# Patient Record
Sex: FEMALE | Race: WHITE | NOT HISPANIC OR LATINO | Employment: OTHER | URBAN - METROPOLITAN AREA
[De-identification: names, ages, dates, MRNs, and addresses within clinical notes are randomized per-mention and may not be internally consistent; named-entity substitution may affect disease eponyms.]

---

## 2018-05-07 ENCOUNTER — HOSPITAL ENCOUNTER (OUTPATIENT)
Dept: RADIOLOGY | Facility: MEDICAL CENTER | Age: 63
End: 2018-05-07
Attending: PHYSICIAN ASSISTANT
Payer: COMMERCIAL

## 2018-05-07 ENCOUNTER — HOSPITAL ENCOUNTER (OUTPATIENT)
Dept: RADIOLOGY | Facility: MEDICAL CENTER | Age: 63
End: 2018-05-07

## 2018-05-07 DIAGNOSIS — Z12.39 SCREENING BREAST EXAMINATION: ICD-10-CM

## 2018-05-07 PROCEDURE — 77067 SCR MAMMO BI INCL CAD: CPT

## 2018-05-08 ENCOUNTER — TELEPHONE (OUTPATIENT)
Dept: MEDICAL GROUP | Facility: MEDICAL CENTER | Age: 63
End: 2018-05-08

## 2018-05-08 NOTE — TELEPHONE ENCOUNTER
----- Message from Claudette Child P.A.-C. sent at 5/8/2018 10:01 AM PDT -----  Please inform patient that Mammogram result was normal, follow up mammogram is recommended in one year.      Thank you,  Claudette Child P.A.-C.

## 2018-05-08 NOTE — TELEPHONE ENCOUNTER
Phone Number Called: [phone] (home)     Message: LVM to call back.     Left Message for patient to call back: yes

## 2018-05-16 ENCOUNTER — OFFICE VISIT (OUTPATIENT)
Dept: MEDICAL GROUP | Facility: MEDICAL CENTER | Age: 63
End: 2018-05-16
Payer: COMMERCIAL

## 2018-05-16 VITALS
HEART RATE: 68 BPM | HEIGHT: 64 IN | BODY MASS INDEX: 23.73 KG/M2 | OXYGEN SATURATION: 99 % | WEIGHT: 139 LBS | TEMPERATURE: 97.3 F | DIASTOLIC BLOOD PRESSURE: 80 MMHG | SYSTOLIC BLOOD PRESSURE: 120 MMHG

## 2018-05-16 DIAGNOSIS — F41.8 DEPRESSION WITH ANXIETY: ICD-10-CM

## 2018-05-16 DIAGNOSIS — G47.9 SLEEP DISORDER: ICD-10-CM

## 2018-05-16 DIAGNOSIS — E78.00 HYPERCHOLESTEREMIA: ICD-10-CM

## 2018-05-16 DIAGNOSIS — A60.00 GENITAL HERPES SIMPLEX, UNSPECIFIED SITE: ICD-10-CM

## 2018-05-16 DIAGNOSIS — I10 ESSENTIAL HYPERTENSION: ICD-10-CM

## 2018-05-16 PROCEDURE — 99204 OFFICE O/P NEW MOD 45 MIN: CPT | Performed by: PHYSICIAN ASSISTANT

## 2018-05-16 RX ORDER — ZOLPIDEM TARTRATE 5 MG/1
5 TABLET ORAL NIGHTLY PRN
Qty: 30 TAB | Refills: 0
Start: 2018-05-16 | End: 2018-06-15

## 2018-05-16 ASSESSMENT — PATIENT HEALTH QUESTIONNAIRE - PHQ9: CLINICAL INTERPRETATION OF PHQ2 SCORE: 0

## 2018-05-16 NOTE — PROGRESS NOTES
On on Kendra Ryan is a 62 y.o. new female here for establishing care:      HPI:    Depression with anxiety  Pt is currently taking sertraline for a couple years without any side effects. Stasis medicine has been helping her with her symptoms. Before she was taking paroxetine for anxiety, she stopped because it was making her too tired.    Essential hypertension  Pt has known HTN, controlled with current medicines, lisinopril. She denies HA, blurred vision, dizziness, shortness of breath, palpitations, or chest pain, lower extremity edema.      Genital herpes  Pt gets genital herpes outbreaks once or twice a year treated with Valtrex as needed. Currently no sores or outbreaks.    Hypercholesteremia  Currently on atorvastatin for elevated cholesterol. Denies any SE or mylagia.  Denies SOP, CP      Current medicines (including changes today)  Current Outpatient Prescriptions   Medication Sig Dispense Refill   • SERTRALINE HCL PO Take  by mouth.     • ATORVASTATIN CALCIUM PO Take  by mouth.     • LISINOPRIL PO Take  by mouth.     • ValACYclovir HCl (VALTREX PO) Take  by mouth.     • zolpidem (AMBIEN) 5 MG Tab Take 1 Tab by mouth at bedtime as needed for Sleep for up to 30 days. 30 Tab 0   • Cyanocobalamin (VITAMIN B-12) 1000 MCG/15ML LIQD Take  by mouth every day.     • aspirin (ASA) 325 MG TABS Take 325 mg by mouth every 6 hours as needed.     • tramadol (ULTRAM) 50 MG TABS Take 1 Tab by mouth every 6 hours as needed ((Pain Scale 1-3)). 120 Tab 0   • ondansetron (ZOFRAN ODT) 4 MG TBDP Take 1-2 Tabs by mouth every four hours as needed for Nausea/Vomiting. 30 Tab 0   • enoxaparin (LOVENOX) 30 MG/0.3ML SOLN inj Inject 30 mg as instructed every 12 hours. 42 Syringe 0   • EPINEPHrine (EPIPEN) 0.3 MG/0.3ML SOAJ by Injection route as needed.     • MetroNIDAZOLE (FLAGYL PO) Take  by mouth. Pt to take prior to surgery, hx of c-diff     • paroxetine (PAXIL) 20 MG TABS Take 20 mg by mouth every evening. Take  with food       No current facility-administered medications for this visit.      She  has a past medical history of Arthritis; Bronchitis (); C. difficile colitis; C. difficile diarrhea (2013); Cancer ([facility]) (); Diverticulitis; Insomnia related to another mental disorder; Pain; Pneumonia (); Psychiatric disturbance; and ULCERATIVE COLITIS. She also has no past medical history of ASTHMA or Diabetes.  She  has a past surgical history that includes other orthopedic surgery; tonsillectomy; wrist orif (2013); fecal transplant (13); and knee arthroplasty total (2014).  Social History   Substance Use Topics   • Smoking status: Former Smoker     Types: Cigarettes     Quit date: 2013   • Smokeless tobacco: Never Used      Comment: 2 cig /week   • Alcohol use Yes      Comment: 2-3 per week     Social History     Social History Narrative   • No narrative on file     Family History   Problem Relation Age of Onset   • Cancer Mother 48     breast   • Cancer Maternal Grandmother    • Hypertension Father    • Heart Disease Father      Family Status   Relation Status   • Mother    • Maternal Grandmother    • Father    • Brother        Past medical, surgical, family, and social history is reviewed in Epic chart by me today.   Medications and allergies reviewed in Epic chart by me today.       ROS  General:  No fevers or chills, no night sweats or fatigue.   Eyes: no change in vision.   ENT:         Ears: No drainage. No change in hearing      Nose :No epitaxis        Mouth/ throat: No lesions. No sore throat  Resp: No difficulty breathing. No cough, wheezing.  CV: no SOB, no palpitation, no chest pain, no edema  GI: no nausea, no vomiting, no diarrhea or constipations. Bowel regular and normal. No melena or hematochezia. No hematemesis  : no Frequency, no urgency, no dysuria, no hematuria.   MS:  Negative for muscle pain or weakness.  Skin: no rashes or abnormal lesions.  "  Neuro: No seizures, no tingling or numbness.   Endo: no polyuria, no polydypsia, no cold intolerance, no heat intolerance  Hem: no easy bruising.    As documented in history of present illness  ROS neg:  All other review of systems were reviewed and negative.             Objective:     Blood pressure 120/80, pulse 68, temperature 36.3 °C (97.3 °F), height 1.626 m (5' 4\"), weight 63 kg (139 lb), SpO2 99 %, not currently breastfeeding. Body mass index is 23.86 kg/m².  Physical Exam:    Constitutional: Well-developed and well-nourished. No distress.   Skin: Skin is warm and dry. No rashes in visible areas.  Nail: w/o pitting, ridging or onychomycosis   Head: Atraumatic without lesions.  Eyes: Equal, round and reactive, conjunctiva clear,sclera non icteric, lids normal.  Ears:  External ears unremarkable. Tympanic membranes clear and intact.  Nose: Nares patent. Septum midline. Turbinates without erythema nor edema. No discharge.    Mouth/Throat: Dentition is good. Tongue normal. Oropharynx is clear and moist. Posterior pharynx without erythema or exudates.  Neck: Supple, trachea midline.  No thyromegaly present. No lymphadenopathy--cervical or supraclavicular  Cardiovascular: Regular rate and rhythm, without any murmurs.  No lower extremity edema. Cap refill < 3sec  Lung:  Clear to auscultation throughout w/o any wheeze or rhonchi. No adventitious sounds.    Abdomen: Soft, non tender, and without distention.   Musculoskeletal: All major joints without pain or swelling  Neurological: speech normal, mental status intact, gait grossly normal  Psychiatric:   Alert and oriented x3, normal affect and mood and behavior     Assessment and Plan:   The following treatment plan was discussed    1. Sleep disorder    - zolpidem (AMBIEN) 5 MG Tab; Take 1 Tab by mouth at bedtime as needed for Sleep for up to 30 days.  Dispense: 30 Tab; Refill: 0    2. Essential hypertension  - lisinopril    3. " Hypercholesteremia  -Atorvastatin      4. Depression with anxiety      5. Genital herpes simplex, unspecified site      Last lab 6 months ago at [facility]. Normal      Followup: Return pap smear .         Please note that this dictation was created using voice recognition software. I have made every reasonable attempt to correct obvious errors, but I expect that there are errors of grammar and possibly content that I did not discover before finalizing the note

## 2018-05-16 NOTE — ASSESSMENT & PLAN NOTE
Pt is currently taking sertraline for a couple years without any side effects. Stasis medicine has been helping her with her symptoms. Before she was taking paroxetine for anxiety, she stopped because it was making her too tired.

## 2018-05-16 NOTE — ASSESSMENT & PLAN NOTE
Pt gets genital herpes outbreaks once or twice a year treated with Valtrex as needed. Currently no sores or outbreaks.

## 2018-05-16 NOTE — ASSESSMENT & PLAN NOTE
Pt has known HTN, controlled with current medicines, lisinopril. She denies HA, blurred vision, dizziness, shortness of breath, palpitations, or chest pain, lower extremity edema.

## 2018-05-16 NOTE — LETTER
[facility]  Amilcar Lino M.D.  [address]  [place] [address]  Fax: [phone]   Authorization for Release/Disclosure of   Protected Health Information   Name: TEODORO DELGADO : 1955 SSN: [id number]   Address: [address] [place] [address] [address] [place] [address] Phone:    [phone] (home)    I authorize the entity listed below to release/disclose the PHI below to:   [facility]/Amilcar Lino M.D. and Claudette Child P.A.-C.   Provider or Entity Name: Dr. Santa Otto     Address   City, State, Zip   Phone:      Fax:[phone]     Reason for request: continuity of care   Information to be released:    [  ] LAST COLONOSCOPY,  including any PATH REPORT and follow-up  [  ] LAST FIT/COLOGUARD RESULT [  ] LAST DEXA  [  ] LAST MAMMOGRAM  [  ] LAST PAP  [  ] LAST LABS [  ] RETINA EXAM REPORT  [  ] IMMUNIZATION RECORDS  [ xxx ] Release all info      [  ] Check here and initial the line next to each item to release ALL health information INCLUDING  _____ Care and treatment for drug and / or alcohol abuse  _____ HIV testing, infection status, or AIDS  _____ Genetic Testing    DATES OF SERVICE OR TIME PERIOD TO BE DISCLOSED: _____________  I understand and acknowledge that:  * This Authorization may be revoked at any time by you in writing, except if your health information has already been used or disclosed.  * Your health information that will be used or disclosed as a result of you signing this authorization could be re-disclosed by the recipient. If this occurs, your re-disclosed health information may no longer be protected by State or Federal laws.  * You may refuse to sign this Authorization. Your refusal will not affect your ability to obtain treatment.  * This Authorization becomes effective upon signing and will  on (date) __________.      If no date is indicated, this Authorization will  one (1) year from the signature date.    Name: Teodoro  Tracey Ryan    Signature:   Date:     5/16/2018       PLEASE FAX REQUESTED RECORDS BACK TO: [phone]

## 2018-06-13 ENCOUNTER — APPOINTMENT (OUTPATIENT)
Dept: MEDICAL GROUP | Facility: MEDICAL CENTER | Age: 63
End: 2018-06-13
Payer: COMMERCIAL

## 2018-06-20 ENCOUNTER — TELEPHONE (OUTPATIENT)
Dept: MEDICAL GROUP | Facility: MEDICAL CENTER | Age: 63
End: 2018-06-20

## 2018-06-20 ENCOUNTER — OFFICE VISIT (OUTPATIENT)
Dept: MEDICAL GROUP | Facility: MEDICAL CENTER | Age: 63
End: 2018-06-20
Payer: COMMERCIAL

## 2018-06-20 VITALS
DIASTOLIC BLOOD PRESSURE: 78 MMHG | BODY MASS INDEX: 24.13 KG/M2 | TEMPERATURE: 98.1 F | OXYGEN SATURATION: 97 % | HEART RATE: 74 BPM | HEIGHT: 64 IN | SYSTOLIC BLOOD PRESSURE: 112 MMHG | WEIGHT: 141.31 LBS

## 2018-06-20 DIAGNOSIS — E78.00 HYPERCHOLESTEREMIA: ICD-10-CM

## 2018-06-20 DIAGNOSIS — F41.8 DEPRESSION WITH ANXIETY: ICD-10-CM

## 2018-06-20 DIAGNOSIS — R01.1 HEART MURMUR: ICD-10-CM

## 2018-06-20 DIAGNOSIS — I10 ESSENTIAL HYPERTENSION: ICD-10-CM

## 2018-06-20 DIAGNOSIS — G47.9 SLEEP DISORDER: ICD-10-CM

## 2018-06-20 DIAGNOSIS — R53.83 TIREDNESS: ICD-10-CM

## 2018-06-20 DIAGNOSIS — A60.00 GENITAL HERPES SIMPLEX, UNSPECIFIED SITE: ICD-10-CM

## 2018-06-20 DIAGNOSIS — Z00.00 PREVENTATIVE HEALTH CARE: ICD-10-CM

## 2018-06-20 PROCEDURE — 99214 OFFICE O/P EST MOD 30 MIN: CPT | Performed by: PHYSICIAN ASSISTANT

## 2018-06-20 RX ORDER — TRAZODONE HYDROCHLORIDE 50 MG/1
50 TABLET ORAL
Qty: 30 TAB | Refills: 3 | Status: CANCELLED | OUTPATIENT
Start: 2018-06-20

## 2018-06-20 RX ORDER — ZOLPIDEM TARTRATE 10 MG/1
10 TABLET ORAL NIGHTLY PRN
COMMUNITY
End: 2018-06-20 | Stop reason: SDUPTHER

## 2018-06-20 RX ORDER — TRAZODONE HYDROCHLORIDE 50 MG/1
50 TABLET ORAL
Qty: 30 TAB | Refills: 5 | Status: SHIPPED | OUTPATIENT
Start: 2018-06-20 | End: 2019-02-04

## 2018-06-20 RX ORDER — ZOLPIDEM TARTRATE 10 MG/1
10 TABLET ORAL NIGHTLY PRN
Qty: 30 TAB | Refills: 2 | Status: SHIPPED
Start: 2018-06-20 | End: 2018-06-21 | Stop reason: SDUPTHER

## 2018-06-20 RX ORDER — ACYCLOVIR 200 MG/1
800 CAPSULE ORAL 2 TIMES DAILY
Qty: 30 CAP | Refills: 3 | Status: SHIPPED | OUTPATIENT
Start: 2018-06-20 | End: 2019-05-23

## 2018-06-20 RX ORDER — LISINOPRIL 10 MG/1
10 TABLET ORAL DAILY
Qty: 90 TAB | Refills: 3 | Status: ON HOLD | OUTPATIENT
Start: 2018-06-20 | End: 2019-05-31

## 2018-06-20 RX ORDER — ATORVASTATIN CALCIUM 20 MG/1
20 TABLET, FILM COATED ORAL DAILY
Qty: 90 TAB | Refills: 3 | Status: ON HOLD | OUTPATIENT
Start: 2018-06-20 | End: 2019-05-31

## 2018-06-20 NOTE — ASSESSMENT & PLAN NOTE
Pt has known HTN, controlled with current medicines, lisinopril 10 mg. She denies HA, blurred vision, dizziness,  palpitations, or chest pain, lower extremity edema.

## 2018-06-20 NOTE — TELEPHONE ENCOUNTER
"Please inform patient that I did send trazodone to pharmacy however she is to be careful since she is also taking Zoloft. They can both increased serotonin levels, therefore potential risk for \"serotonin syndrome\". I recommend staying away from trazodone as much as possible.    Claudette Child, P.A.-C.        "

## 2018-06-20 NOTE — PROGRESS NOTES
Subjective:   CC: Heart murmur, follow-up on depression and anxiety, sleep disorder, hypertension       Kendra Ryan is a 62 y.o. female here today for :    pt has depression with anxiety, and is currently taking Zoloft 50 MG daily for depression and anxiety which has been helping with her symptoms. Patient also has sleep disorder and trouble sleeping at night and takes Ambien as the need at night. States he done and it has a very difficult time sleeping at night. However she has been trying to wean herself off. Some nights into the taking the Ambien she has been taking trazodone 25-50 MG daily at bedtime.    Pt has Essential hypertension, controlled with current medicines, lisinopril 10 mg. She denies HA, blurred vision, dizziness,  palpitations, or chest pain, lower extremity edema. Patient was told she has a heart murmur about a year and a half ago and was referredby a cardiologist to do an echo which never happened. She didn't follow-up with a cardiologist. States she has fatigue and tiredness and tired every day.             Current medicines (including changes today)  Current Outpatient Prescriptions   Medication Sig Dispense Refill   • zolpidem (AMBIEN) 10 MG Tab Take 1 Tab by mouth at bedtime as needed for Sleep for up to 30 days. 30 Tab 2   • lisinopril (PRINIVIL) 10 MG Tab Take 1 Tab by mouth every day. 90 Tab 3   • atorvastatin (LIPITOR) 20 MG Tab Take 1 Tab by mouth every day. 90 Tab 3   • sertraline (ZOLOFT) 50 MG Tab Take 1 Tab by mouth every day. 90 Tab 3   • acyclovir (ZOVIRAX) 200 MG Cap Take 4 Caps by mouth 2 times a day. 30 Cap 3   • SERTRALINE HCL PO Take  by mouth.     • ATORVASTATIN CALCIUM PO Take  by mouth.     • LISINOPRIL PO Take  by mouth.     • tramadol (ULTRAM) 50 MG TABS Take 1 Tab by mouth every 6 hours as needed ((Pain Scale 1-3)). 120 Tab 0   • ondansetron (ZOFRAN ODT) 4 MG TBDP Take 1-2 Tabs by mouth every four hours as needed for Nausea/Vomiting. 30 Tab 0   •  "enoxaparin (LOVENOX) 30 MG/0.3ML SOLN inj Inject 30 mg as instructed every 12 hours. 42 Syringe 0   • EPINEPHrine (EPIPEN) 0.3 MG/0.3ML SOAJ by Injection route as needed.     • Cyanocobalamin (VITAMIN B-12) 1000 MCG/15ML LIQD Take  by mouth every day.     • aspirin (ASA) 325 MG TABS Take 325 mg by mouth every 6 hours as needed.     • MetroNIDAZOLE (FLAGYL PO) Take  by mouth. Pt to take prior to surgery, hx of c-diff     • paroxetine (PAXIL) 20 MG TABS Take 20 mg by mouth every evening. Take with food       No current facility-administered medications for this visit.          Past medical, surgical, family, and social history are reviewed in Epic chart by me today.   Medications and allergies reviewed in Epic chart by me today.         ROS   As documented in history of present illness above     Objective:     Blood pressure 112/78, pulse 74, temperature 36.7 °C (98.1 °F), height 1.626 m (5' 4\"), weight 64.1 kg (141 lb 5 oz), SpO2 97 %, not currently breastfeeding. Body mass index is 24.26 kg/m².   Physical Exam:  Constitutional: Alert, oriented in no acute distress.  Psych: Eye contact is good, speech goal directed, affect calm  Lungs: Unlabored respiratory effort, clear to auscultation bilaterally with good excursion, no wheez or rhonci  CV: regular rate and rhythm. Pos murmur,  No lower extremity edema  Skin: no lesions in visible areas.  Ext: no edema, color normal, vascularity normal, temperature normal        Assessment and Plan:   The following treatment plan was discussed    1. Heart murmur    - ECHOCARDIOGRAM COMP W/O CONT; Future  - REFERRAL TO CARDIOLOGY    2. Tiredness    - ECHOCARDIOGRAM COMP W/O CONT; Future  - REFERRAL TO CARDIOLOGY    3. Sleep disorder  Patient knows not to take trazodone and Ambien same night  Potential risk for serotonin syndrome since patient also takes Zoloft    - traZODone (DESYREL) 50 MG Tab; Take 1 Tab by mouth at bedtime as needed for Sleep.  Dispense: 30 Tab; Refill: 5  - " zolpidem (AMBIEN) 10 MG Tab; Take 1 Tab by mouth at bedtime as needed for Sleep for up to 30 days.  Dispense: 30 Tab; Refill: 2    4. Depression with anxiety    - sertraline (ZOLOFT) 50 MG Tab; Take 1 Tab by mouth every day.  Dispense: 90 Tab; Refill: 3    5. Essential hypertension    - lisinopril (PRINIVIL) 10 MG Tab; Take 1 Tab by mouth every day.  Dispense: 90 Tab; Refill: 3    6. Genital herpes simplex, unspecified site    - acyclovir (ZOVIRAX) 200 MG Cap; Take 4 Caps by mouth 2 times a day.  Dispense: 30 Cap; Refill: 3    7. Hypercholesteremia    - atorvastatin (LIPITOR) 20 MG Tab; Take 1 Tab by mouth every day.  Dispense: 90 Tab; Refill: 3    8. Preventative health care    - CBC WITH DIFFERENTIAL; Future  - COMP METABOLIC PANEL; Future  - LIPID PROFILE; Future  - TSH WITH REFLEX TO FT4; Future  - VITAMIN D,25 HYDROXY; Future    Last blood work a yr ago    Followup: No Follow-up on file.         Please note that this dictation was created using voice recognition software. I have made every reasonable attempt to correct obvious errors, but I expect that there are errors of grammar and possibly content that I did not discover before finalizing the note.

## 2018-06-20 NOTE — ASSESSMENT & PLAN NOTE
Patient is currently taking Zoloft 50 MG daily for depression and anxiety which has been helping with her symptoms. Patient also has trouble sleeping at night and takes Ambien as the need at night. States he done and it has a very difficult time sleeping at night. However she has been trying to wean herself off. Some nights into the taking the Ambien she has been taking trazodone 25-50 MG daily at bedtime.

## 2018-06-21 DIAGNOSIS — G47.9 SLEEP DISORDER: ICD-10-CM

## 2018-06-21 RX ORDER — ZOLPIDEM TARTRATE 10 MG/1
10 TABLET ORAL NIGHTLY PRN
Qty: 30 TAB | Refills: 2 | Status: SHIPPED
Start: 2018-06-21 | End: 2018-09-21 | Stop reason: SDUPTHER

## 2018-07-04 ENCOUNTER — APPOINTMENT (OUTPATIENT)
Dept: CARDIOLOGY | Facility: MEDICAL CENTER | Age: 63
End: 2018-07-04
Attending: PHYSICIAN ASSISTANT
Payer: COMMERCIAL

## 2018-07-06 ENCOUNTER — TELEPHONE (OUTPATIENT)
Dept: MEDICAL GROUP | Facility: MEDICAL CENTER | Age: 63
End: 2018-07-06

## 2018-07-06 NOTE — TELEPHONE ENCOUNTER
Patient called in to let you know that her insurance won't cover her labs under preventative health care. She also wanted to let you know her insurance won't cover the Echo and she would have to pay 1,000 dollars out of pocket.

## 2018-08-21 ENCOUNTER — TELEPHONE (OUTPATIENT)
Dept: CARDIOLOGY | Facility: MEDICAL CENTER | Age: 63
End: 2018-08-21

## 2018-08-22 ENCOUNTER — TELEPHONE (OUTPATIENT)
Dept: CARDIOLOGY | Facility: MEDICAL CENTER | Age: 63
End: 2018-08-22

## 2018-09-21 DIAGNOSIS — G47.9 SLEEP DISORDER: ICD-10-CM

## 2018-09-21 RX ORDER — ZOLPIDEM TARTRATE 10 MG/1
10 TABLET ORAL NIGHTLY PRN
Qty: 30 TAB | Refills: 2 | Status: SHIPPED
Start: 2018-09-21 | End: 2018-10-21

## 2018-09-21 NOTE — TELEPHONE ENCOUNTER
Was the patient seen in the last year in this department? Yes    Does patient have an active prescription for medications requested? No     Received Request Via: Pharmacy      placed in [facility]'s box for review.

## 2018-11-06 ENCOUNTER — TELEPHONE (OUTPATIENT)
Dept: CARDIOLOGY | Facility: MEDICAL CENTER | Age: 63
End: 2018-11-06

## 2018-11-06 NOTE — TELEPHONE ENCOUNTER
Called patient to ask if they had completed Echo at different location or rescheduled. No answer, LVM to call back.

## 2018-11-09 ENCOUNTER — OFFICE VISIT (OUTPATIENT)
Dept: CARDIOLOGY | Facility: MEDICAL CENTER | Age: 63
End: 2018-11-09
Payer: COMMERCIAL

## 2018-11-09 VITALS
RESPIRATION RATE: 14 BRPM | HEIGHT: 64 IN | HEART RATE: 74 BPM | OXYGEN SATURATION: 99 % | DIASTOLIC BLOOD PRESSURE: 64 MMHG | WEIGHT: 143 LBS | BODY MASS INDEX: 24.41 KG/M2 | SYSTOLIC BLOOD PRESSURE: 106 MMHG

## 2018-11-09 DIAGNOSIS — E78.00 HYPERCHOLESTEREMIA: ICD-10-CM

## 2018-11-09 DIAGNOSIS — R01.1 UNDIAGNOSED CARDIAC MURMURS: ICD-10-CM

## 2018-11-09 DIAGNOSIS — Z82.49 FAMILY HISTORY OF EARLY CAD: ICD-10-CM

## 2018-11-09 DIAGNOSIS — I10 ESSENTIAL HYPERTENSION: ICD-10-CM

## 2018-11-09 PROCEDURE — 99203 OFFICE O/P NEW LOW 30 MIN: CPT | Mod: 25 | Performed by: INTERNAL MEDICINE

## 2018-11-09 PROCEDURE — 93000 ELECTROCARDIOGRAM COMPLETE: CPT | Performed by: INTERNAL MEDICINE

## 2018-11-09 RX ORDER — ZOLPIDEM TARTRATE 10 MG/1
5 TABLET ORAL NIGHTLY PRN
COMMUNITY
End: 2019-02-01 | Stop reason: SDUPTHER

## 2018-11-09 NOTE — PROGRESS NOTES
Chief Complaint   Patient presents with   • Heart Murmur     Heart murmur possibly and essential hypertension.       Subjective:   Kendra Ryan is a 63 y.o. female who presents today for initial consultation regarding a heart murmur.  She has a history of hypertension and hyperlipidemia on therapy with good blood pressure control.  No history of coronary artery disease personally but she does have a family history of precocious CAD with her father having had his first heart attack in his 40s.  She is a non-smoker who drinks 2 drinks per night does not do drugs.  She has no exertional symptoms is moderately active and works at [employer].    Denies any other cardiovascular symptoms including chest pain, shortness of breath, dyspnea on exertion, lightheadedness, syncope or presyncope, lower extremity edema, PND, orthopnea or palpitations.      Past Medical History:   Diagnosis Date   • Arthritis     knees   • Bronchitis 2013   • C. difficile colitis    • C. difficile diarrhea 5/2013    resolved since 1/2014   • Cancer (HCC) 2008    skin cancer   • Diverticulitis    • Insomnia related to another mental disorder     stress   • Pain     right knee   • Pneumonia 2011   • Psychiatric disturbance     stress   • ULCERATIVE COLITIS      Past Surgical History:   Procedure Laterality Date   • KNEE ARTHROPLASTY TOTAL  9/26/2014    Performed by Marco Antonio Friend M.D. at SUR[facility]ERY [facility]   • FECAL TRANSPLANT  11/5/13   • WRIST ORIF  9/5/2013    Performed by Teto Hernandez M.D. at SURGERY [facility]   • OTHER ORTHOPEDIC SURGERY      knee    • TONSILLECTOMY       Family History   Problem Relation Age of Onset   • Cancer Mother 48        breast   • Cancer Maternal Grandmother    • Hypertension Father    • Heart Disease Father      Social History     Social History   • Marital status:      Spouse name: N/A   • Number of children: N/A   • Years of education: N/A     Occupational History   • Not on  file.     Social History Main Topics   • Smoking status: Former Smoker     Types: Cigarettes     Quit date: 2/20/2013   • Smokeless tobacco: Never Used      Comment: 2 cig /week   • Alcohol use Yes      Comment: 2-3 per week   • Drug use: No   • Sexual activity: Not Currently     Other Topics Concern   • Not on file     Social History Narrative   • No narrative on file     Allergies   Allergen Reactions   • Latex Anaphylaxis, Shortness of Breath and Swelling     Outpatient Encounter Prescriptions as of 11/9/2018   Medication Sig Dispense Refill   • zolpidem (AMBIEN) 10 MG Tab Take 5 mg by mouth at bedtime as needed for Sleep.     • lisinopril (PRINIVIL) 10 MG Tab Take 1 Tab by mouth every day. 90 Tab 3   • atorvastatin (LIPITOR) 20 MG Tab Take 1 Tab by mouth every day. 90 Tab 3   • sertraline (ZOLOFT) 50 MG Tab Take 1 Tab by mouth every day. 90 Tab 3   • acyclovir (ZOVIRAX) 200 MG Cap Take 4 Caps by mouth 2 times a day. 30 Cap 3   • EPINEPHrine (EPIPEN) 0.3 MG/0.3ML SOAJ by Injection route as needed.     • aspirin (ASA) 325 MG TABS Take 325 mg by mouth as needed.     • traZODone (DESYREL) 50 MG Tab Take 1 Tab by mouth at bedtime as needed for Sleep. (Patient not taking: Reported on 11/9/2018) 30 Tab 5   • SERTRALINE HCL PO Take  by mouth.     • ATORVASTATIN CALCIUM PO Take  by mouth.     • LISINOPRIL PO Take  by mouth.     • tramadol (ULTRAM) 50 MG TABS Take 1 Tab by mouth every 6 hours as needed ((Pain Scale 1-3)). (Patient not taking: Reported on 11/9/2018) 120 Tab 0   • ondansetron (ZOFRAN ODT) 4 MG TBDP Take 1-2 Tabs by mouth every four hours as needed for Nausea/Vomiting. (Patient not taking: Reported on 11/9/2018) 30 Tab 0   • enoxaparin (LOVENOX) 30 MG/0.3ML SOLN inj Inject 30 mg as instructed every 12 hours. (Patient not taking: Reported on 11/9/2018) 42 Syringe 0   • Cyanocobalamin (VITAMIN B-12) 1000 MCG/15ML LIQD Take  by mouth every day.     • MetroNIDAZOLE (FLAGYL PO) Take  by mouth. Pt to take prior to  "surgery, hx of c-diff     • paroxetine (PAXIL) 20 MG TABS Take 20 mg by mouth every evening. Take with food       No facility-administered encounter medications on file as of 11/9/2018.      Review of Systems   All other systems reviewed and are negative.       Objective:   /64 (BP Location: Left arm, Patient Position: Sitting, BP Cuff Size: Adult)   Pulse 74   Resp 14   Ht 1.626 m (5' 4\")   Wt 64.9 kg (143 lb)   SpO2 99%   BMI 24.55 kg/m²     Physical Exam   Constitutional: She is oriented to person, place, and time. She appears well-developed and well-nourished. No distress.   HENT:   Head: Normocephalic and atraumatic.   Right Ear: External ear normal.   Left Ear: External ear normal.   Mouth/Throat: No oropharyngeal exudate.   Eyes: Pupils are equal, round, and reactive to light. Conjunctivae and EOM are normal. Right eye exhibits no discharge. Left eye exhibits no discharge. No scleral icterus.   Neck: Normal range of motion. Neck supple. No JVD present. No tracheal deviation present. No thyromegaly present.   Cardiovascular: Normal rate, regular rhythm, S1 normal, S2 normal and intact distal pulses.  PMI is not displaced.  Exam reveals no gallop, no S3, no S4 and no friction rub.    Murmur heard.   Crescendo systolic murmur is present with a grade of 2/6   Pulses:       Carotid pulses are 2+ on the right side, and 2+ on the left side.       Radial pulses are 2+ on the right side, and 2+ on the left side.        Popliteal pulses are 2+ on the right side, and 2+ on the left side.        Dorsalis pedis pulses are 2+ on the right side, and 2+ on the left side.        Posterior tibial pulses are 2+ on the right side, and 2+ on the left side.   Pulmonary/Chest: Effort normal and breath sounds normal. No respiratory distress. She has no wheezes. She has no rales. She exhibits no tenderness.   Abdominal: Soft. Bowel sounds are normal. She exhibits no distension. There is no tenderness.   Musculoskeletal: " Normal range of motion. She exhibits no edema or tenderness.   Neurological: She is alert and oriented to person, place, and time. No cranial nerve deficit (Cranial nerves II through XII grossly intact).   Skin: Skin is warm and dry. No rash noted. She is not diaphoretic. No erythema.   Psychiatric: She has a normal mood and affect. Her behavior is normal. Judgment and thought content normal.   Vitals reviewed.    EKG (11/9/2018 ):  I have personally reviewed the EKG this visit and discussed with the patient.  Sinus rhythm with RSR prime in V1 normal variant.    Assessment:     1. Undiagnosed cardiac murmurs  EC-ECHOCARDIOGRAM COMPLETE W/O CONT   2. Essential hypertension  EKG    Lipid Profile    COMP METABOLIC PANEL   3. Family history of early CAD  Lipid Profile    COMP METABOLIC PANEL   4. Hypercholesteremia  Lipid Profile    COMP METABOLIC PANEL       Medical Decision Making:  Today's Assessment / Status / Plan:     Well and asymptomatic with very slight murmur.  She was recommended to have this investigated by her other cardiologist agrees but never was able to have this completed.  I agree with an echocardiogram to assess and help with surveillance and/or medical therapy.  Also recommend lipid profile complete metabolic panel and cholesterol targets based a primary prevention given her early family history of coronary artery disease.  Being on her cholesterol level on therapy, CT calcium scoring may be reasonable    Recommend follow-up 1 year

## 2018-11-12 LAB — EKG IMPRESSION: NORMAL

## 2018-12-03 ENCOUNTER — TELEPHONE (OUTPATIENT)
Dept: CARDIOLOGY | Facility: MEDICAL CENTER | Age: 63
End: 2018-12-03

## 2018-12-03 NOTE — TELEPHONE ENCOUNTER
----- Message from Destiny Bhatt sent at 12/3/2018 10:04 AM PST -----  Regarding: echo denied  Contact: [phone]  REECE/Juan Patricio from [employer] calling to report echo scheduled for tomorrow has been denied.  Please call [facility] to discuss, [phone].

## 2018-12-03 NOTE — TELEPHONE ENCOUNTER
Called and spoke with Sintia regarding denial. She states they did not receive notice that there was an issue until 1700 last night and then received a denial at 1000 this morning. Sintia provided number for peer to peer [phone] for case number [id number]. Called pt's insurance and conducted peer to peer. Authorization received, [id number] exp 1/3/19. Called and informed Sintia.

## 2019-01-11 RX ORDER — ZOLPIDEM TARTRATE 10 MG/1
TABLET ORAL
Refills: 2 | OUTPATIENT
Start: 2019-01-11

## 2019-01-30 NOTE — TELEPHONE ENCOUNTER
Pt requested a refill on zolpidem. I left pt a mess informing that this is a controlled medication that requires an office visit. I asked pt to call our scheduling dept for an zay

## 2019-02-01 ENCOUNTER — TELEPHONE (OUTPATIENT)
Dept: MEDICAL GROUP | Facility: MEDICAL CENTER | Age: 64
End: 2019-02-01

## 2019-02-01 DIAGNOSIS — G47.00 INSOMNIA, UNSPECIFIED TYPE: ICD-10-CM

## 2019-02-01 RX ORDER — ZOLPIDEM TARTRATE 10 MG/1
5 TABLET ORAL NIGHTLY PRN
Qty: 3 TAB | Refills: 0 | Status: SHIPPED
Start: 2019-02-01 | End: 2019-02-04 | Stop reason: SDUPTHER

## 2019-02-01 NOTE — PROGRESS NOTES
I have reviewed the Prescription Monitoring Program () today, she has been without Ambien for 3 days.  Last appointment with me was June last year.  She needs at least 3 months follow-up appointments for Ambien refill because it is a controlled substance.  Also last time she said that some nights she takes trazodone instead to help her sleeping at night.  Patient has appointment with Dr. Garcia on Monday.  I will talk to him about this patient.  I will give patient 3 Ambien pills until she is seen by Dr. Garcia on Monday.    Also she has not done any of the blood work I have ordered for her last time.  Last blood work on file is from 2014.  Patient is not being compliant.    Claudette Child P.A.-C.

## 2019-02-01 NOTE — TELEPHONE ENCOUNTER
Left pt a mess informing that we do not have openings here at [facility] today however she may head over to any urgent care office in order to be seen and evaluated for sleep problem.   Pt to call us if further questions

## 2019-02-01 NOTE — TELEPHONE ENCOUNTER
----- Message from Janet Rubalcava sent at 2/1/2019 11:43 AM PST -----  Regarding: Ambien  Contact: [phone]  Patient has an appt with Dr. Smith on Monday, she is a tahir patient. She's having sleeping issues and is requesting refill on Ambien. I told her there's usually no way to do this without being seen first. She's requesting something over the weekend, to get by until appt. Patient states she's starting to have hallucinations because she's so exhausted. Patient is waiting today in [facility] to see if there's an opening in the schedule       Janet

## 2019-02-04 ENCOUNTER — OFFICE VISIT (OUTPATIENT)
Dept: MEDICAL GROUP | Facility: MEDICAL CENTER | Age: 64
End: 2019-02-04
Payer: COMMERCIAL

## 2019-02-04 VITALS
BODY MASS INDEX: 24.07 KG/M2 | RESPIRATION RATE: 16 BRPM | TEMPERATURE: 97.9 F | OXYGEN SATURATION: 96 % | SYSTOLIC BLOOD PRESSURE: 114 MMHG | HEIGHT: 64 IN | HEART RATE: 75 BPM | WEIGHT: 141 LBS | DIASTOLIC BLOOD PRESSURE: 72 MMHG

## 2019-02-04 DIAGNOSIS — I10 ESSENTIAL HYPERTENSION: ICD-10-CM

## 2019-02-04 DIAGNOSIS — G47.00 INSOMNIA, UNSPECIFIED TYPE: ICD-10-CM

## 2019-02-04 DIAGNOSIS — E78.00 HYPERCHOLESTEREMIA: ICD-10-CM

## 2019-02-04 PROCEDURE — 99214 OFFICE O/P EST MOD 30 MIN: CPT | Performed by: FAMILY MEDICINE

## 2019-02-04 RX ORDER — ZOLPIDEM TARTRATE 10 MG/1
5 TABLET ORAL NIGHTLY PRN
Qty: 7 TAB | Refills: 0 | Status: SHIPPED | OUTPATIENT
Start: 2019-02-04 | End: 2019-02-18

## 2019-02-04 RX ORDER — EPINEPHRINE 0.3 MG/.3ML
INJECTION SUBCUTANEOUS PRN
Qty: 1 EACH | Refills: 3 | Status: CANCELLED | OUTPATIENT
Start: 2019-02-04

## 2019-02-04 RX ORDER — ZOLPIDEM TARTRATE 10 MG/1
5 TABLET ORAL NIGHTLY PRN
Qty: 3 TAB | Refills: 0 | Status: CANCELLED | OUTPATIENT
Start: 2019-02-04 | End: 2019-02-07

## 2019-02-04 ASSESSMENT — PATIENT HEALTH QUESTIONNAIRE - PHQ9: CLINICAL INTERPRETATION OF PHQ2 SCORE: 0

## 2019-02-04 NOTE — ASSESSMENT & PLAN NOTE
Patient describes a 4-year history of sleep onset and sleep maintenance insomnia.  She says that about 4 years ago she was admitted to the hospital off and on for C. difficile.  She was given Ambien and ultimately developed an addiction.  Since then, she has been maintained on Ambien and is trying to taper off.  She states she is currently taking Ambien 10 mg half a tablet nightly.  On rare occasion she will take a full tablet.  She would like to continue this dose and follow-up with her PCP.  She has used trazodone without success in the past.

## 2019-02-04 NOTE — PROGRESS NOTES
[facility]  Progress Note  Established Patient    Subjective:   Kendra Ryan is a 63 y.o. female here today with a chief complaint of insomnia. The patient is alone.     Insomnia  Patient describes a 4-year history of sleep onset and sleep maintenance insomnia.  She says that about 4 years ago she was admitted to the hospital off and on for C. difficile.  She was given Ambien and ultimately developed an addiction.  Since then, she has been maintained on Ambien and is trying to taper off.  She states she is currently taking Ambien 10 mg half a tablet nightly.  On rare occasion she will take a full tablet.  She would like to continue this dose and follow-up with her PCP.  She has used trazodone without success in the past.    Essential hypertension  Blood pressure is at goal on her current medication regimen.    Hypercholesteremia  Cholesterol is controlled with atorvastatin.  She has not gotten labs ordered by her PA-C.      Current Outpatient Prescriptions on File Prior to Visit   Medication Sig Dispense Refill   • lisinopril (PRINIVIL) 10 MG Tab Take 1 Tab by mouth every day. 90 Tab 3   • atorvastatin (LIPITOR) 20 MG Tab Take 1 Tab by mouth every day. 90 Tab 3   • sertraline (ZOLOFT) 50 MG Tab Take 1 Tab by mouth every day. 90 Tab 3   • aspirin (ASA) 325 MG TABS Take 325 mg by mouth as needed.     • acyclovir (ZOVIRAX) 200 MG Cap Take 4 Caps by mouth 2 times a day. 30 Cap 3   • EPINEPHrine (EPIPEN) 0.3 MG/0.3ML SOAJ by Injection route as needed.     • Cyanocobalamin (VITAMIN B-12) 1000 MCG/15ML LIQD Take  by mouth every day.       No current facility-administered medications on file prior to visit.        Past Medical History:   Diagnosis Date   • Arthritis     knees   • Bronchitis 2013   • C. difficile colitis    • C. 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    Family History: family history includes Cancer in her maternal grandmother; Cancer (age of onset: 48) in her mother; Heart Disease in her father; Hypertension in her father.    Social History:  reports that she quit smoking about 5 years ago. Her smoking use included Cigarettes. She has never used smokeless tobacco. She reports that she drinks alcohol. She reports that she does not use drugs.    ROS: no fever or nausea.        Objective:     Vitals:    02/04/19 0917   BP: 114/72   BP Location: Right arm   Patient Position: Sitting   BP Cuff Size: Adult   Pulse: 75   Resp: 16   Temp: 36.6 °C (97.9 °F)   TempSrc: Temporal   SpO2: 96%   Weight: 64 kg (141 lb)   Height: 1.626 m (5' 4\")       Physical Exam:  General: alert in no apparent distress.   Affect: normal.         Assessment and Plan:     1. Insomnia, unspecified type  I reinforced the risks of long-term Ambien use with the patient.  I commended her on her willingness to taper.   checked and appropriate.  I will prescribe her 14-day course of Ambien 10 mg 1/2 tablet daily.  When the patient sees her PCP in 2 weeks, she will discuss further taper.  Counseled on appropriate and safe use of this medicine.  - zolpidem (AMBIEN) 10 MG Tab; Take 0.5 Tabs by mouth at bedtime as needed for Sleep for up to 14 days.  Dispense: 7 Tab; Refill: 0    2. Essential hypertension  - continue current regimen.   - patient states got labs at an outside facility that were normal. She will bring in result to her PCP appt.     3. Hypercholesteremia  - continue statin.   - patient states got labs at an outside facility that were normal. She will bring in result to " her PCP appt.         Followup: Return in about 2 weeks (around 2/18/2019), or if symptoms worsen or fail to improve.

## 2019-02-05 ENCOUNTER — TELEPHONE (OUTPATIENT)
Dept: MEDICAL GROUP | Facility: MEDICAL CENTER | Age: 64
End: 2019-02-05

## 2019-02-05 NOTE — TELEPHONE ENCOUNTER
Pharmacy left a mess stating that pt picked up a quantity of 15 tablets for a 30 day supply rx'd by Dr. Otto on 1/14/2019.   The pharmacy received a new rx from you for 7 tabs for a 14 day supply.  Pharmacy stated that they need your approval in order to fill your rx since pt is not due until 2/14/2019 for a refill.   Dr. Smith, please advise....   info;    Fill Date Written Drug Qty Days Prescriber Pharmacy Refill Daily Dose *    01/14/2019 01/14/2019 Zolpidem Tartrate 10 Mg Tablet 15 30 Ka Yesenia Duong (5479) 0 0.25 [facility]   11/25/2018 09/21/2018 Zolpidem Tartrate 10 Mg Tablet 30 30 Se Di Duong (5479) 2 0.50 [facility]

## 2019-02-06 NOTE — TELEPHONE ENCOUNTER
Spoke with the pharmacy and they stated that pt picked up the rx for zolpidem as follow:  1) Dr. Otto rx'd 15 tab 1/14/19 for a 30 day supply       ( Per pt they got wet so she can't take them)  2) Dr. Smith rx'd 7 tab for 15 day supply and pt picked it up 2/4/2019.    I left pt a mess to keep her zay with [employer] on 2/19/2019 and the quantity of the pills she picked up ought to last until her zay.  Pt to call if further questions.

## 2019-02-06 NOTE — TELEPHONE ENCOUNTER
The patient stated that sometimes she would take a full tablet. Please call the pharmacy to approve the Rx.

## 2019-05-23 ENCOUNTER — HOSPITAL ENCOUNTER (OUTPATIENT)
Dept: RADIOLOGY | Facility: MEDICAL CENTER | Age: 64
DRG: 470 | End: 2019-05-23
Attending: ORTHOPAEDIC SURGERY | Admitting: ORTHOPAEDIC SURGERY
Payer: MEDICAID

## 2019-05-23 DIAGNOSIS — Z01.812 PRE-OPERATIVE LABORATORY EXAMINATION: ICD-10-CM

## 2019-05-23 DIAGNOSIS — Z01.811 PRE-OPERATIVE RESPIRATORY EXAMINATION: ICD-10-CM

## 2019-05-23 DIAGNOSIS — Z01.810 PRE-OPERATIVE CARDIOVASCULAR EXAMINATION: ICD-10-CM

## 2019-05-23 LAB
ANION GAP SERPL CALC-SCNC: 8 MMOL/L (ref 0–11.9)
APPEARANCE UR: CLEAR
APTT PPP: 28.9 SEC (ref 24.7–36)
BASOPHILS # BLD AUTO: 0.9 % (ref 0–1.8)
BASOPHILS # BLD: 0.04 K/UL (ref 0–0.12)
BILIRUB UR QL STRIP.AUTO: NEGATIVE
BUN SERPL-MCNC: 18 MG/DL (ref 8–22)
CALCIUM SERPL-MCNC: 9.5 MG/DL (ref 8.5–10.5)
CHLORIDE SERPL-SCNC: 110 MMOL/L (ref 96–112)
CO2 SERPL-SCNC: 24 MMOL/L (ref 20–33)
COLOR UR: YELLOW
CREAT SERPL-MCNC: 0.62 MG/DL (ref 0.5–1.4)
EKG IMPRESSION: NORMAL
EOSINOPHIL # BLD AUTO: 0.08 K/UL (ref 0–0.51)
EOSINOPHIL NFR BLD: 1.7 % (ref 0–6.9)
ERYTHROCYTE [DISTWIDTH] IN BLOOD BY AUTOMATED COUNT: 44.2 FL (ref 35.9–50)
ERYTHROCYTE [SEDIMENTATION RATE] IN BLOOD BY WESTERGREN METHOD: 4 MM/HOUR (ref 0–30)
GLUCOSE SERPL-MCNC: 87 MG/DL (ref 65–99)
GLUCOSE UR STRIP.AUTO-MCNC: NEGATIVE MG/DL
HCT VFR BLD AUTO: 41.8 % (ref 37–47)
HGB BLD-MCNC: 13.9 G/DL (ref 12–16)
IMM GRANULOCYTES # BLD AUTO: 0 K/UL (ref 0–0.11)
IMM GRANULOCYTES NFR BLD AUTO: 0 % (ref 0–0.9)
INR PPP: 0.98 (ref 0.87–1.13)
KETONES UR STRIP.AUTO-MCNC: ABNORMAL MG/DL
LEUKOCYTE ESTERASE UR QL STRIP.AUTO: NEGATIVE
LYMPHOCYTES # BLD AUTO: 1.88 K/UL (ref 1–4.8)
LYMPHOCYTES NFR BLD: 40.2 % (ref 22–41)
MCH RBC QN AUTO: 29.8 PG (ref 27–33)
MCHC RBC AUTO-ENTMCNC: 33.3 G/DL (ref 33.6–35)
MCV RBC AUTO: 89.7 FL (ref 81.4–97.8)
MICRO URNS: ABNORMAL
MONOCYTES # BLD AUTO: 0.32 K/UL (ref 0–0.85)
MONOCYTES NFR BLD AUTO: 6.8 % (ref 0–13.4)
NEUTROPHILS # BLD AUTO: 2.36 K/UL (ref 2–7.15)
NEUTROPHILS NFR BLD: 50.4 % (ref 44–72)
NITRITE UR QL STRIP.AUTO: NEGATIVE
NRBC # BLD AUTO: 0 K/UL
NRBC BLD-RTO: 0 /100 WBC
PH UR STRIP.AUTO: 5.5 [PH]
PLATELET # BLD AUTO: 274 K/UL (ref 164–446)
PMV BLD AUTO: 8.9 FL (ref 9–12.9)
POTASSIUM SERPL-SCNC: 3.9 MMOL/L (ref 3.6–5.5)
PROT UR QL STRIP: NEGATIVE MG/DL
PROTHROMBIN TIME: 13 SEC (ref 12–14.6)
RBC # BLD AUTO: 4.66 M/UL (ref 4.2–5.4)
RBC UR QL AUTO: NEGATIVE
SCCMEC + MECA PNL NOSE NAA+PROBE: NEGATIVE
SCCMEC + MECA PNL NOSE NAA+PROBE: NEGATIVE
SODIUM SERPL-SCNC: 142 MMOL/L (ref 135–145)
SP GR UR STRIP.AUTO: 1.03
UROBILINOGEN UR STRIP.AUTO-MCNC: 1 MG/DL
WBC # BLD AUTO: 4.7 K/UL (ref 4.8–10.8)

## 2019-05-23 PROCEDURE — 85652 RBC SED RATE AUTOMATED: CPT

## 2019-05-23 PROCEDURE — 85025 COMPLETE CBC W/AUTO DIFF WBC: CPT

## 2019-05-23 PROCEDURE — 85610 PROTHROMBIN TIME: CPT

## 2019-05-23 PROCEDURE — 87641 MR-STAPH DNA AMP PROBE: CPT

## 2019-05-23 PROCEDURE — 81003 URINALYSIS AUTO W/O SCOPE: CPT

## 2019-05-23 PROCEDURE — 36415 COLL VENOUS BLD VENIPUNCTURE: CPT

## 2019-05-23 PROCEDURE — 71046 X-RAY EXAM CHEST 2 VIEWS: CPT

## 2019-05-23 PROCEDURE — 80048 BASIC METABOLIC PNL TOTAL CA: CPT

## 2019-05-23 PROCEDURE — 93010 ELECTROCARDIOGRAM REPORT: CPT | Performed by: INTERNAL MEDICINE

## 2019-05-23 PROCEDURE — 87640 STAPH A DNA AMP PROBE: CPT | Mod: XU

## 2019-05-23 PROCEDURE — 93005 ELECTROCARDIOGRAM TRACING: CPT

## 2019-05-23 PROCEDURE — 85730 THROMBOPLASTIN TIME PARTIAL: CPT

## 2019-05-23 RX ORDER — ZOLPIDEM TARTRATE 5 MG/1
5 TABLET ORAL
COMMUNITY

## 2019-05-23 NOTE — DISCHARGE PLANNING
DISCHARGE PLANNING NOTE - TOTAL JOINT     Procedure: Procedure(s):  ARTHROPLASTY, KNEE, TOTAL  Procedure Date: 5/31/2019  Insurance:  Payor: [employer] MEDICAID / Plan: [facility] MEDICAID    Equipment currently available at home? None  Steps into the home? 2-3   Steps within the home? 2 story   Toilet height? Standard  Type of shower? walk-in shower  Who will be with you during your recovery? friend  Is Outpatient Physical Therapy set up after surgery? No   Did you take the Total Joint Class and where? No, provided a TJR binder to patient.     Plan: Reviewed Equipment Resource list and provided a copy to the patient. Recommended a raised toilet seat and shower chair. She will need a FWW and signed the choice form for [facility]; form scanned into media. Please obtain an order for a walker and have delivered to the patient's room. Anticipate discharge home.

## 2019-05-31 ENCOUNTER — HOSPITAL ENCOUNTER (INPATIENT)
Facility: MEDICAL CENTER | Age: 64
LOS: 1 days | DRG: 470 | End: 2019-06-01
Attending: ORTHOPAEDIC SURGERY | Admitting: ORTHOPAEDIC SURGERY
Payer: MEDICAID

## 2019-05-31 ENCOUNTER — ANESTHESIA (OUTPATIENT)
Dept: SURGERY | Facility: MEDICAL CENTER | Age: 64
DRG: 470 | End: 2019-05-31
Payer: MEDICAID

## 2019-05-31 ENCOUNTER — APPOINTMENT (OUTPATIENT)
Dept: RADIOLOGY | Facility: MEDICAL CENTER | Age: 64
DRG: 470 | End: 2019-05-31
Attending: PHYSICIAN ASSISTANT
Payer: MEDICAID

## 2019-05-31 ENCOUNTER — ANESTHESIA EVENT (OUTPATIENT)
Dept: SURGERY | Facility: MEDICAL CENTER | Age: 64
DRG: 470 | End: 2019-05-31
Payer: MEDICAID

## 2019-05-31 DIAGNOSIS — G89.18 POSTOPERATIVE PAIN: ICD-10-CM

## 2019-05-31 PROCEDURE — 0SRD0J9 REPLACEMENT OF LEFT KNEE JOINT WITH SYNTHETIC SUBSTITUTE, CEMENTED, OPEN APPROACH: ICD-10-PCS | Performed by: ORTHOPAEDIC SURGERY

## 2019-05-31 PROCEDURE — 160022 HCHG BLOCK: Performed by: ORTHOPAEDIC SURGERY

## 2019-05-31 PROCEDURE — 502000 HCHG MISC OR IMPLANTS RC 0278: Performed by: ORTHOPAEDIC SURGERY

## 2019-05-31 PROCEDURE — 502579 HCHG PACK, TOTAL KNEE: Performed by: ORTHOPAEDIC SURGERY

## 2019-05-31 PROCEDURE — 700102 HCHG RX REV CODE 250 W/ 637 OVERRIDE(OP): Performed by: ANESTHESIOLOGY

## 2019-05-31 PROCEDURE — 700111 HCHG RX REV CODE 636 W/ 250 OVERRIDE (IP): Performed by: ANESTHESIOLOGY

## 2019-05-31 PROCEDURE — 73560 X-RAY EXAM OF KNEE 1 OR 2: CPT | Mod: LT

## 2019-05-31 PROCEDURE — 160048 HCHG OR STATISTICAL LEVEL 1-5: Performed by: ORTHOPAEDIC SURGERY

## 2019-05-31 PROCEDURE — 700101 HCHG RX REV CODE 250: Performed by: ANESTHESIOLOGY

## 2019-05-31 PROCEDURE — A9270 NON-COVERED ITEM OR SERVICE: HCPCS | Performed by: PHYSICIAN ASSISTANT

## 2019-05-31 PROCEDURE — 160035 HCHG PACU - 1ST 60 MINS PHASE I: Performed by: ORTHOPAEDIC SURGERY

## 2019-05-31 PROCEDURE — 501838 HCHG SUTURE GENERAL: Performed by: ORTHOPAEDIC SURGERY

## 2019-05-31 PROCEDURE — 700101 HCHG RX REV CODE 250: Performed by: PHYSICIAN ASSISTANT

## 2019-05-31 PROCEDURE — 160002 HCHG RECOVERY MINUTES (STAT): Performed by: ORTHOPAEDIC SURGERY

## 2019-05-31 PROCEDURE — 770006 HCHG ROOM/CARE - MED/SURG/GYN SEMI*

## 2019-05-31 PROCEDURE — 700105 HCHG RX REV CODE 258: Performed by: PHYSICIAN ASSISTANT

## 2019-05-31 PROCEDURE — 160009 HCHG ANES TIME/MIN: Performed by: ORTHOPAEDIC SURGERY

## 2019-05-31 PROCEDURE — 700111 HCHG RX REV CODE 636 W/ 250 OVERRIDE (IP): Performed by: PHYSICIAN ASSISTANT

## 2019-05-31 PROCEDURE — A9270 NON-COVERED ITEM OR SERVICE: HCPCS | Performed by: ANESTHESIOLOGY

## 2019-05-31 PROCEDURE — 503051 HCHG CLOSURE PRINEO SKIN: Performed by: ORTHOPAEDIC SURGERY

## 2019-05-31 PROCEDURE — 160036 HCHG PACU - EA ADDL 30 MINS PHASE I: Performed by: ORTHOPAEDIC SURGERY

## 2019-05-31 PROCEDURE — 700102 HCHG RX REV CODE 250 W/ 637 OVERRIDE(OP): Performed by: PHYSICIAN ASSISTANT

## 2019-05-31 PROCEDURE — 160029 HCHG SURGERY MINUTES - 1ST 30 MINS LEVEL 4: Performed by: ORTHOPAEDIC SURGERY

## 2019-05-31 PROCEDURE — 700101 HCHG RX REV CODE 250: Performed by: ORTHOPAEDIC SURGERY

## 2019-05-31 PROCEDURE — 700112 HCHG RX REV CODE 229: Performed by: PHYSICIAN ASSISTANT

## 2019-05-31 PROCEDURE — 700105 HCHG RX REV CODE 258: Performed by: ORTHOPAEDIC SURGERY

## 2019-05-31 PROCEDURE — L8699 PROSTHETIC IMPLANT NOS: HCPCS | Performed by: ORTHOPAEDIC SURGERY

## 2019-05-31 PROCEDURE — 160041 HCHG SURGERY MINUTES - EA ADDL 1 MIN LEVEL 4: Performed by: ORTHOPAEDIC SURGERY

## 2019-05-31 DEVICE — BONE CEMENT SIMPLEX ANTIBIO - (10/PK): Type: IMPLANTABLE DEVICE | Site: KNEE | Status: FUNCTIONAL

## 2019-05-31 DEVICE — IMPLANTABLE DEVICE: Type: IMPLANTABLE DEVICE | Site: KNEE | Status: FUNCTIONAL

## 2019-05-31 RX ORDER — LABETALOL HYDROCHLORIDE 5 MG/ML
5 INJECTION, SOLUTION INTRAVENOUS
Status: DISCONTINUED | OUTPATIENT
Start: 2019-05-31 | End: 2019-05-31

## 2019-05-31 RX ORDER — CEFAZOLIN SODIUM 1 G/3ML
INJECTION, POWDER, FOR SOLUTION INTRAMUSCULAR; INTRAVENOUS PRN
Status: DISCONTINUED | OUTPATIENT
Start: 2019-05-31 | End: 2019-05-31 | Stop reason: SURG

## 2019-05-31 RX ORDER — OXYCODONE HYDROCHLORIDE 5 MG/1
20 TABLET ORAL
Status: DISCONTINUED | OUTPATIENT
Start: 2019-05-31 | End: 2019-06-01 | Stop reason: HOSPADM

## 2019-05-31 RX ORDER — MORPHINE SULFATE 4 MG/ML
4 INJECTION, SOLUTION INTRAMUSCULAR; INTRAVENOUS
Status: DISCONTINUED | OUTPATIENT
Start: 2019-05-31 | End: 2019-06-01 | Stop reason: HOSPADM

## 2019-05-31 RX ORDER — VANCOMYCIN HCL 900 MCG/MG
POWDER (GRAM) MISCELLANEOUS
Status: DISCONTINUED | OUTPATIENT
Start: 2019-05-31 | End: 2019-05-31 | Stop reason: HOSPADM

## 2019-05-31 RX ORDER — METOCLOPRAMIDE HYDROCHLORIDE 5 MG/ML
INJECTION INTRAMUSCULAR; INTRAVENOUS PRN
Status: DISCONTINUED | OUTPATIENT
Start: 2019-05-31 | End: 2019-05-31 | Stop reason: SURG

## 2019-05-31 RX ORDER — ATORVASTATIN CALCIUM 20 MG/1
20 TABLET, FILM COATED ORAL NIGHTLY
Status: DISCONTINUED | OUTPATIENT
Start: 2019-05-31 | End: 2019-06-01 | Stop reason: HOSPADM

## 2019-05-31 RX ORDER — LISINOPRIL 10 MG/1
10 TABLET ORAL EVERY EVENING
COMMUNITY

## 2019-05-31 RX ORDER — ZOLPIDEM TARTRATE 5 MG/1
2.5 TABLET ORAL NIGHTLY PRN
Status: DISCONTINUED | OUTPATIENT
Start: 2019-05-31 | End: 2019-05-31

## 2019-05-31 RX ORDER — CEFAZOLIN SODIUM 2 G/100ML
2 INJECTION, SOLUTION INTRAVENOUS
Status: DISCONTINUED | OUTPATIENT
Start: 2019-05-31 | End: 2019-05-31 | Stop reason: HOSPADM

## 2019-05-31 RX ORDER — LISINOPRIL 20 MG/1
10 TABLET ORAL EVERY EVENING
Status: DISCONTINUED | OUTPATIENT
Start: 2019-05-31 | End: 2019-06-01 | Stop reason: HOSPADM

## 2019-05-31 RX ORDER — DOCUSATE SODIUM 100 MG/1
100 CAPSULE, LIQUID FILLED ORAL 2 TIMES DAILY
Status: DISCONTINUED | OUTPATIENT
Start: 2019-05-31 | End: 2019-06-01 | Stop reason: HOSPADM

## 2019-05-31 RX ORDER — SODIUM CHLORIDE, SODIUM LACTATE, POTASSIUM CHLORIDE, CALCIUM CHLORIDE 600; 310; 30; 20 MG/100ML; MG/100ML; MG/100ML; MG/100ML
INJECTION, SOLUTION INTRAVENOUS CONTINUOUS
Status: DISCONTINUED | OUTPATIENT
Start: 2019-05-31 | End: 2019-05-31

## 2019-05-31 RX ORDER — AMOXICILLIN 250 MG
1 CAPSULE ORAL NIGHTLY
Status: DISCONTINUED | OUTPATIENT
Start: 2019-05-31 | End: 2019-06-01 | Stop reason: HOSPADM

## 2019-05-31 RX ORDER — BISACODYL 10 MG
10 SUPPOSITORY, RECTAL RECTAL
Status: DISCONTINUED | OUTPATIENT
Start: 2019-05-31 | End: 2019-06-01 | Stop reason: HOSPADM

## 2019-05-31 RX ORDER — HALOPERIDOL 5 MG/ML
1 INJECTION INTRAMUSCULAR
Status: DISCONTINUED | OUTPATIENT
Start: 2019-05-31 | End: 2019-05-31

## 2019-05-31 RX ORDER — KETOROLAC TROMETHAMINE 30 MG/ML
30 INJECTION, SOLUTION INTRAMUSCULAR; INTRAVENOUS EVERY 6 HOURS
Status: DISCONTINUED | OUTPATIENT
Start: 2019-05-31 | End: 2019-06-01 | Stop reason: HOSPADM

## 2019-05-31 RX ORDER — ONDANSETRON 2 MG/ML
4 INJECTION INTRAMUSCULAR; INTRAVENOUS EVERY 4 HOURS PRN
Status: DISCONTINUED | OUTPATIENT
Start: 2019-05-31 | End: 2019-06-01 | Stop reason: HOSPADM

## 2019-05-31 RX ORDER — ROPIVACAINE HYDROCHLORIDE 5 MG/ML
INJECTION, SOLUTION EPIDURAL; INFILTRATION; PERINEURAL PRN
Status: DISCONTINUED | OUTPATIENT
Start: 2019-05-31 | End: 2019-05-31 | Stop reason: SURG

## 2019-05-31 RX ORDER — ONDANSETRON 2 MG/ML
INJECTION INTRAMUSCULAR; INTRAVENOUS PRN
Status: DISCONTINUED | OUTPATIENT
Start: 2019-05-31 | End: 2019-05-31 | Stop reason: SURG

## 2019-05-31 RX ORDER — DIPHENHYDRAMINE HYDROCHLORIDE 50 MG/ML
25 INJECTION INTRAMUSCULAR; INTRAVENOUS EVERY 6 HOURS PRN
Status: DISCONTINUED | OUTPATIENT
Start: 2019-05-31 | End: 2019-06-01 | Stop reason: HOSPADM

## 2019-05-31 RX ORDER — OXYCODONE HCL 5 MG/5 ML
10 SOLUTION, ORAL ORAL
Status: DISCONTINUED | OUTPATIENT
Start: 2019-05-31 | End: 2019-05-31

## 2019-05-31 RX ORDER — HYDRALAZINE HYDROCHLORIDE 20 MG/ML
5 INJECTION INTRAMUSCULAR; INTRAVENOUS
Status: DISCONTINUED | OUTPATIENT
Start: 2019-05-31 | End: 2019-05-31

## 2019-05-31 RX ORDER — HYDROMORPHONE HYDROCHLORIDE 1 MG/ML
0.2 INJECTION, SOLUTION INTRAMUSCULAR; INTRAVENOUS; SUBCUTANEOUS
Status: DISCONTINUED | OUTPATIENT
Start: 2019-05-31 | End: 2019-05-31

## 2019-05-31 RX ORDER — ACETAMINOPHEN 500 MG
1000 TABLET ORAL ONCE
Status: COMPLETED | OUTPATIENT
Start: 2019-05-31 | End: 2019-05-31

## 2019-05-31 RX ORDER — OXYCODONE HCL 5 MG/5 ML
5 SOLUTION, ORAL ORAL
Status: DISCONTINUED | OUTPATIENT
Start: 2019-05-31 | End: 2019-05-31

## 2019-05-31 RX ORDER — METOPROLOL TARTRATE 1 MG/ML
1 INJECTION, SOLUTION INTRAVENOUS
Status: DISCONTINUED | OUTPATIENT
Start: 2019-05-31 | End: 2019-05-31

## 2019-05-31 RX ORDER — HYDROMORPHONE HYDROCHLORIDE 1 MG/ML
0.4 INJECTION, SOLUTION INTRAMUSCULAR; INTRAVENOUS; SUBCUTANEOUS
Status: DISCONTINUED | OUTPATIENT
Start: 2019-05-31 | End: 2019-05-31

## 2019-05-31 RX ORDER — CHLORPROMAZINE HYDROCHLORIDE 25 MG/1
25 TABLET, FILM COATED ORAL EVERY 6 HOURS PRN
Status: DISCONTINUED | OUTPATIENT
Start: 2019-05-31 | End: 2019-06-01 | Stop reason: HOSPADM

## 2019-05-31 RX ORDER — CHLORPROMAZINE HYDROCHLORIDE 25 MG/ML
25 INJECTION INTRAMUSCULAR EVERY 6 HOURS PRN
Status: DISCONTINUED | OUTPATIENT
Start: 2019-05-31 | End: 2019-06-01 | Stop reason: HOSPADM

## 2019-05-31 RX ORDER — HYDROMORPHONE HYDROCHLORIDE 1 MG/ML
0.1 INJECTION, SOLUTION INTRAMUSCULAR; INTRAVENOUS; SUBCUTANEOUS
Status: DISCONTINUED | OUTPATIENT
Start: 2019-05-31 | End: 2019-05-31

## 2019-05-31 RX ORDER — NAPROXEN 500 MG/1
500 TABLET ORAL 2 TIMES DAILY PRN
COMMUNITY
End: 2019-06-13

## 2019-05-31 RX ORDER — ONDANSETRON 2 MG/ML
4 INJECTION INTRAMUSCULAR; INTRAVENOUS
Status: DISCONTINUED | OUTPATIENT
Start: 2019-05-31 | End: 2019-05-31

## 2019-05-31 RX ORDER — ATORVASTATIN CALCIUM 20 MG/1
20 TABLET, FILM COATED ORAL NIGHTLY
COMMUNITY

## 2019-05-31 RX ORDER — MEPERIDINE HYDROCHLORIDE 25 MG/ML
12.5 INJECTION INTRAMUSCULAR; INTRAVENOUS; SUBCUTANEOUS
Status: DISCONTINUED | OUTPATIENT
Start: 2019-05-31 | End: 2019-05-31

## 2019-05-31 RX ORDER — ACETAMINOPHEN 500 MG
1000 TABLET ORAL
COMMUNITY
End: 2019-06-13

## 2019-05-31 RX ORDER — SCOLOPAMINE TRANSDERMAL SYSTEM 1 MG/1
PATCH, EXTENDED RELEASE TRANSDERMAL PRN
Status: DISCONTINUED | OUTPATIENT
Start: 2019-05-31 | End: 2019-05-31 | Stop reason: SURG

## 2019-05-31 RX ORDER — ZOLPIDEM TARTRATE 5 MG/1
2.5-5 TABLET ORAL NIGHTLY PRN
Status: DISCONTINUED | OUTPATIENT
Start: 2019-06-01 | End: 2019-06-01 | Stop reason: HOSPADM

## 2019-05-31 RX ORDER — SCOLOPAMINE TRANSDERMAL SYSTEM 1 MG/1
1 PATCH, EXTENDED RELEASE TRANSDERMAL
Status: DISCONTINUED | OUTPATIENT
Start: 2019-05-31 | End: 2019-06-01 | Stop reason: HOSPADM

## 2019-05-31 RX ORDER — BUPIVACAINE HYDROCHLORIDE AND EPINEPHRINE 5; 5 MG/ML; UG/ML
INJECTION, SOLUTION PERINEURAL
Status: DISCONTINUED | OUTPATIENT
Start: 2019-05-31 | End: 2019-05-31 | Stop reason: HOSPADM

## 2019-05-31 RX ORDER — MAGNESIUM SULFATE HEPTAHYDRATE 40 MG/ML
INJECTION, SOLUTION INTRAVENOUS PRN
Status: DISCONTINUED | OUTPATIENT
Start: 2019-05-31 | End: 2019-05-31 | Stop reason: SURG

## 2019-05-31 RX ORDER — DIPHENHYDRAMINE HYDROCHLORIDE 50 MG/ML
12.5 INJECTION INTRAMUSCULAR; INTRAVENOUS
Status: DISCONTINUED | OUTPATIENT
Start: 2019-05-31 | End: 2019-05-31

## 2019-05-31 RX ORDER — CEFAZOLIN SODIUM 2 G/100ML
2 INJECTION, SOLUTION INTRAVENOUS EVERY 8 HOURS
Status: COMPLETED | OUTPATIENT
Start: 2019-05-31 | End: 2019-06-01

## 2019-05-31 RX ORDER — DEXAMETHASONE SODIUM PHOSPHATE 4 MG/ML
INJECTION, SOLUTION INTRA-ARTICULAR; INTRALESIONAL; INTRAMUSCULAR; INTRAVENOUS; SOFT TISSUE PRN
Status: DISCONTINUED | OUTPATIENT
Start: 2019-05-31 | End: 2019-05-31 | Stop reason: SURG

## 2019-05-31 RX ORDER — TRANEXAMIC ACID 100 MG/ML
INJECTION, SOLUTION INTRAVENOUS PRN
Status: DISCONTINUED | OUTPATIENT
Start: 2019-05-31 | End: 2019-05-31 | Stop reason: SURG

## 2019-05-31 RX ORDER — SCOLOPAMINE TRANSDERMAL SYSTEM 1 MG/1
PATCH, EXTENDED RELEASE TRANSDERMAL
Status: COMPLETED
Start: 2019-05-31 | End: 2019-05-31

## 2019-05-31 RX ORDER — AMOXICILLIN 250 MG
1 CAPSULE ORAL
Status: DISCONTINUED | OUTPATIENT
Start: 2019-05-31 | End: 2019-06-01 | Stop reason: HOSPADM

## 2019-05-31 RX ORDER — DEXTROSE MONOHYDRATE, SODIUM CHLORIDE, AND POTASSIUM CHLORIDE 50; 1.49; 4.5 G/1000ML; G/1000ML; G/1000ML
INJECTION, SOLUTION INTRAVENOUS CONTINUOUS
Status: DISPENSED | OUTPATIENT
Start: 2019-05-31 | End: 2019-05-31

## 2019-05-31 RX ORDER — POLYETHYLENE GLYCOL 3350 17 G/17G
1 POWDER, FOR SOLUTION ORAL 2 TIMES DAILY PRN
Status: DISCONTINUED | OUTPATIENT
Start: 2019-05-31 | End: 2019-06-01 | Stop reason: HOSPADM

## 2019-05-31 RX ORDER — DEXAMETHASONE SODIUM PHOSPHATE 4 MG/ML
4 INJECTION, SOLUTION INTRA-ARTICULAR; INTRALESIONAL; INTRAMUSCULAR; INTRAVENOUS; SOFT TISSUE
Status: DISCONTINUED | OUTPATIENT
Start: 2019-05-31 | End: 2019-06-01 | Stop reason: HOSPADM

## 2019-05-31 RX ORDER — HALOPERIDOL 5 MG/ML
1 INJECTION INTRAMUSCULAR EVERY 6 HOURS PRN
Status: DISCONTINUED | OUTPATIENT
Start: 2019-05-31 | End: 2019-06-01 | Stop reason: HOSPADM

## 2019-05-31 RX ORDER — SERTRALINE HYDROCHLORIDE 100 MG/1
50 TABLET, FILM COATED ORAL EVERY EVENING
Status: DISCONTINUED | OUTPATIENT
Start: 2019-05-31 | End: 2019-06-01 | Stop reason: HOSPADM

## 2019-05-31 RX ORDER — ENEMA 19; 7 G/133ML; G/133ML
1 ENEMA RECTAL
Status: DISCONTINUED | OUTPATIENT
Start: 2019-05-31 | End: 2019-06-01 | Stop reason: HOSPADM

## 2019-05-31 RX ORDER — ACETAMINOPHEN 500 MG
1000 TABLET ORAL EVERY 6 HOURS
Status: DISCONTINUED | OUTPATIENT
Start: 2019-05-31 | End: 2019-06-01 | Stop reason: HOSPADM

## 2019-05-31 RX ORDER — DIPHENHYDRAMINE HCL 25 MG
25 TABLET ORAL EVERY 6 HOURS PRN
Status: DISCONTINUED | OUTPATIENT
Start: 2019-05-31 | End: 2019-06-01 | Stop reason: HOSPADM

## 2019-05-31 RX ORDER — DEXAMETHASONE SODIUM PHOSPHATE 4 MG/ML
6 INJECTION, SOLUTION INTRA-ARTICULAR; INTRALESIONAL; INTRAMUSCULAR; INTRAVENOUS; SOFT TISSUE EVERY 6 HOURS PRN
Status: DISCONTINUED | OUTPATIENT
Start: 2019-05-31 | End: 2019-06-01 | Stop reason: HOSPADM

## 2019-05-31 RX ORDER — DIAZEPAM 5 MG/1
5 TABLET ORAL EVERY 6 HOURS PRN
Status: DISCONTINUED | OUTPATIENT
Start: 2019-05-31 | End: 2019-06-01 | Stop reason: HOSPADM

## 2019-05-31 RX ORDER — OXYCODONE HYDROCHLORIDE 5 MG/1
10 TABLET ORAL
Status: DISCONTINUED | OUTPATIENT
Start: 2019-05-31 | End: 2019-06-01 | Stop reason: HOSPADM

## 2019-05-31 RX ORDER — GABAPENTIN 300 MG/1
300 CAPSULE ORAL ONCE
Status: COMPLETED | OUTPATIENT
Start: 2019-05-31 | End: 2019-05-31

## 2019-05-31 RX ORDER — KETOROLAC TROMETHAMINE 30 MG/ML
INJECTION, SOLUTION INTRAMUSCULAR; INTRAVENOUS PRN
Status: DISCONTINUED | OUTPATIENT
Start: 2019-05-31 | End: 2019-05-31 | Stop reason: SURG

## 2019-05-31 RX ADMIN — DEXAMETHASONE SODIUM PHOSPHATE 6 MG: 4 INJECTION, SOLUTION INTRA-ARTICULAR; INTRALESIONAL; INTRAMUSCULAR; INTRAVENOUS; SOFT TISSUE at 13:05

## 2019-05-31 RX ADMIN — FENTANYL CITRATE 100 MCG: 50 INJECTION, SOLUTION INTRAMUSCULAR; INTRAVENOUS at 14:31

## 2019-05-31 RX ADMIN — TRANEXAMIC ACID 1000 MG: 100 INJECTION, SOLUTION INTRAVENOUS at 15:11

## 2019-05-31 RX ADMIN — PROPOFOL 200 MG: 10 INJECTION, EMULSION INTRAVENOUS at 13:05

## 2019-05-31 RX ADMIN — ACETAMINOPHEN 1000 MG: 500 TABLET ORAL at 10:18

## 2019-05-31 RX ADMIN — ACETAMINOPHEN 1000 MG: 500 TABLET ORAL at 18:08

## 2019-05-31 RX ADMIN — FENTANYL CITRATE 50 MCG: 50 INJECTION, SOLUTION INTRAMUSCULAR; INTRAVENOUS at 13:05

## 2019-05-31 RX ADMIN — FENTANYL CITRATE 50 MCG: 50 INJECTION, SOLUTION INTRAMUSCULAR; INTRAVENOUS at 13:23

## 2019-05-31 RX ADMIN — OXYCODONE HYDROCHLORIDE 10 MG: 5 SOLUTION ORAL at 15:03

## 2019-05-31 RX ADMIN — OXYCODONE HYDROCHLORIDE 10 MG: 5 TABLET ORAL at 18:07

## 2019-05-31 RX ADMIN — POTASSIUM CHLORIDE, DEXTROSE MONOHYDRATE AND SODIUM CHLORIDE: 150; 5; 450 INJECTION, SOLUTION INTRAVENOUS at 15:06

## 2019-05-31 RX ADMIN — METOCLOPRAMIDE 10 MG: 5 INJECTION, SOLUTION INTRAMUSCULAR; INTRAVENOUS at 14:20

## 2019-05-31 RX ADMIN — KETOROLAC TROMETHAMINE 30 MG: 30 INJECTION, SOLUTION INTRAMUSCULAR at 14:23

## 2019-05-31 RX ADMIN — MAGNESIUM SULFATE IN WATER 2 G: 40 INJECTION, SOLUTION INTRAVENOUS at 13:24

## 2019-05-31 RX ADMIN — FENTANYL CITRATE 50 MCG: 50 INJECTION, SOLUTION INTRAMUSCULAR; INTRAVENOUS at 14:08

## 2019-05-31 RX ADMIN — FENTANYL CITRATE 50 MCG: 50 INJECTION, SOLUTION INTRAMUSCULAR; INTRAVENOUS at 13:29

## 2019-05-31 RX ADMIN — CEFAZOLIN 2 G: 330 INJECTION, POWDER, FOR SOLUTION INTRAMUSCULAR; INTRAVENOUS at 13:05

## 2019-05-31 RX ADMIN — CEFAZOLIN SODIUM 2 G: 2 INJECTION, SOLUTION INTRAVENOUS at 20:54

## 2019-05-31 RX ADMIN — TRANEXAMIC ACID 1000 MG: 100 INJECTION, SOLUTION INTRAVENOUS at 13:05

## 2019-05-31 RX ADMIN — LISINOPRIL 10 MG: 20 TABLET ORAL at 18:13

## 2019-05-31 RX ADMIN — DEXAMETHASONE SODIUM PHOSPHATE 2 MG: 4 INJECTION, SOLUTION INTRA-ARTICULAR; INTRALESIONAL; INTRAMUSCULAR; INTRAVENOUS; SOFT TISSUE at 12:40

## 2019-05-31 RX ADMIN — SODIUM CHLORIDE, POTASSIUM CHLORIDE, SODIUM LACTATE AND CALCIUM CHLORIDE: 600; 310; 30; 20 INJECTION, SOLUTION INTRAVENOUS at 10:58

## 2019-05-31 RX ADMIN — OXYCODONE HYDROCHLORIDE 10 MG: 5 TABLET ORAL at 21:37

## 2019-05-31 RX ADMIN — ONDANSETRON 4 MG: 2 INJECTION INTRAMUSCULAR; INTRAVENOUS at 14:20

## 2019-05-31 RX ADMIN — FENTANYL CITRATE 50 MCG: 50 INJECTION, SOLUTION INTRAMUSCULAR; INTRAVENOUS at 14:41

## 2019-05-31 RX ADMIN — KETOROLAC TROMETHAMINE 30 MG: 30 INJECTION, SOLUTION INTRAMUSCULAR; INTRAVENOUS at 18:06

## 2019-05-31 RX ADMIN — LIDOCAINE HYDROCHLORIDE 40 MG: 20 INJECTION, SOLUTION INTRAVENOUS at 13:05

## 2019-05-31 RX ADMIN — ATORVASTATIN CALCIUM 20 MG: 20 TABLET, FILM COATED ORAL at 20:54

## 2019-05-31 RX ADMIN — GABAPENTIN 300 MG: 300 CAPSULE ORAL at 10:19

## 2019-05-31 RX ADMIN — FENTANYL CITRATE 50 MCG: 50 INJECTION INTRAMUSCULAR; INTRAVENOUS at 15:30

## 2019-05-31 RX ADMIN — SENNOSIDES, DOCUSATE SODIUM 1 TABLET: 50; 8.6 TABLET, FILM COATED ORAL at 20:54

## 2019-05-31 RX ADMIN — ROPIVACAINE HYDROCHLORIDE 15 ML: 5 INJECTION, SOLUTION EPIDURAL; INFILTRATION; PERINEURAL at 12:40

## 2019-05-31 RX ADMIN — DOCUSATE SODIUM 100 MG: 100 CAPSULE, LIQUID FILLED ORAL at 18:07

## 2019-05-31 RX ADMIN — SCOPOLAMINE 1 PATCH: 1 PATCH, EXTENDED RELEASE TRANSDERMAL at 12:38

## 2019-05-31 RX ADMIN — SERTRALINE HYDROCHLORIDE 50 MG: 100 TABLET ORAL at 18:08

## 2019-05-31 RX ADMIN — FENTANYL CITRATE 50 MCG: 50 INJECTION INTRAMUSCULAR; INTRAVENOUS at 15:06

## 2019-05-31 RX ADMIN — LIDOCAINE HYDROCHLORIDE 0.5 ML: 10 INJECTION, SOLUTION EPIDURAL; INFILTRATION; INTRACAUDAL; PERINEURAL at 09:00

## 2019-05-31 RX ADMIN — SUGAMMADEX 200 MG: 100 INJECTION, SOLUTION INTRAVENOUS at 14:38

## 2019-05-31 ASSESSMENT — COGNITIVE AND FUNCTIONAL STATUS - GENERAL
SUGGESTED CMS G CODE MODIFIER MOBILITY: CJ
STANDING UP FROM CHAIR USING ARMS: A LITTLE
DAILY ACTIVITIY SCORE: 24
MOBILITY SCORE: 22
MOVING FROM LYING ON BACK TO SITTING ON SIDE OF FLAT BED: A LITTLE
SUGGESTED CMS G CODE MODIFIER DAILY ACTIVITY: CH

## 2019-05-31 NOTE — ANESTHESIA PROCEDURE NOTES
Airway  Date/Time: 5/31/2019 1:07 PM  Performed by: BÁRBARA PONCE  Authorized by: BÁRBARA PONCE     Location:  OR  Urgency:  Elective  Indications for Airway Management:  Anesthesia  Spontaneous Ventilation: absent    Sedation Level:  Deep  Preoxygenated: Yes    Final Airway Type:  Supraglottic airway  Final Supraglottic Airway:  Standard LMA  SGA Size:  4  Number of Attempts at Approach:  1

## 2019-05-31 NOTE — OP REPORT
DATE OF SERVICE:  05/31/2019    SERVICE:  Orthopedic surgery.    PREOPERATIVE DIAGNOSES:  Left knee end-stage degenerative joint disease,   osteoarthritis.    POSTOPERATIVE DIAGNOSES:  Left knee end-stage degenerative joint disease,   osteoarthritis.    PROCEDURE:  Left total knee arthroplasty using DePuy Rolando and Rolando   Attune cemented posterior stabilized total knee system with a size 5 femur, a   size 3 tibial plateau, 9 mm total height polyethylene insert, and a 32 mm   polyethylene patellar component.      SURGEON:  Marco Antonio Friend MD    ASSISTANT SURGEON:  Johnathan Pacheco PA-C and [facility].    ANESTHESIA:  General with adductor block.      ANESTHESIOLOGIST:  Shantanu Zhang MD      COMPLICATIONS:  None.      BLOOD LOSS:  Minimal.      TOURNIQUET TIME:  45 minutes.      DESCRIPTION OF PROCEDURE:  After informed consent was obtained, the patient   was brought to the operating room and given an adductor block to the left   lower extremity, then general anesthetic was administered and the patient was   placed in the supine position and prepped and draped in the usual sterile   fashion after Ancef and tranexamic acid had been given.  After the field was   draped, a time-out was called correctly identifying the patient and procedure   to be done.  All surgical team members agreed on the parameters of the   operation and eye contact was maintained with the circulating nurse throughout   the entire timeout to ensure safety of the patient.  We then exsanguinated   the limb using Esmarch bandage and the tourniquet was inflated to 250 mmHg.    We did not use the standard midline incision because of the patient's previous   surgery, we used the previous medial parapatellar cutaneous incision.  We   carefully dissected down through subcutaneous tissue and skeletonized the   capsular fascial layer.  We then made a medial parapatellar incision and   everted the patella.  Approximately 2/3 of the fat pad was then removed for    visualization purposes.  We noted the patella and the patellofemoral joint to   be completely worn out with widespread grade IV chondromalacia.  We then   measured the thickness of the patella and removed approximately 9 mm off the   back of it.  We measured it to be a size 32 mm component.  A size 32 drill   guide was then placed and all 3 drill holes were made.  Trial patella was then   placed and noted to fit in excellent fashion.      Attention was then turned to preparing the femur.  We first removed the ACL,   PCL, medial and lateral menisci.  We then made our distal femoral drill hole   and made our cut 5 degrees and 10 mm.  We then used the sizing guide,   determined that the distal femur was a size 5.  The size 5, 4-in-1 cutting   guide was then placed and we made our anterior, anterior chamfer, posterior   and posterior chamfer cuts.  A notch cutting guide was then placed and   centered exactly between the epicondyles.  A reciprocating saw was then used   to make the notch cuts.  A trial femur was then placed and noted to fit in   excellent fashion.  The distal femoral drill holes were made.      Attention was then turned to preparing the tibia.  The extramedullary tibial   cutting guide was used.  We made our cut approximately 5 mm deep to the   deepest most aspect of the tibial plateau.  The tibia was then sized to be a   size 3.  A size 3 baseplate was then placed such that a drop eric intersected   the second metatarsal.  We then made our drill and keel cuts.  We then   trialed.  All trial components were then removed.  We then irrigated all   cancellous surfaces with copious amounts of pulsatile lavage and dried them   thoroughly.  Cement was mixed and we cemented into place the tibia, femur and   patella.  All excess cement was removed and checked multiple times.  We then   allowed the cement to harden.  We then trialed.  We noted a size 9 mm height   polyethylene component to be the ideal  component.  We then snapped into place   an implant that was a total height of 9 mm, which was a 5+4 mm.  We then took   the patient through a range of motion.  She easily came to full extension, was   stable to varus valgus stress from 0-135 degree range of motion.  She easily   came to full extension and perhaps 1-2 degrees of recurvatum.  Patellar   tracking was also checked, it was noted to be good.  There was no evidence of   lateral tracking and no need for a lateral release.  After irrigating with   copious amounts of irrigation with pulsatile lavage, we did a Betadine wash.    This was followed by additional irrigation.  This was followed by closure of   the wound.  The wound was closed with #1 Vicryl interrupted suture at the   capsular layer.  Subcutaneous tissue was closed with 2-0 Vicryl and skin was   closed with a Prineo glue Dermabond closure.  Wound dressing was applied.  The   procedure was terminated.  No complications were experienced.  Blood loss was   75 mL.  The tourniquet was let down before the wound was closed and all   bleeding vessels were cauterized.       ____________________________________     MD ALEC GANT / WAYNE    DD:  05/31/2019 14:21:51  DT:  05/31/2019 14:33:14    D#:  [id number]  Job#:  [id number]

## 2019-05-31 NOTE — ANESTHESIA PREPROCEDURE EVALUATION
Ulcerative Colitis  HTN  HLD  Osteoarthritis  PONV  Anxiety    Physical Exam    Airway   Mallampati: I  TM distance: >3 FB  Neck ROM: full       Cardiovascular - normal exam  Rhythm: regular  Rate: normal  (-) murmur     Dental - normal exam         Pulmonary - normal exam  Breath sounds clear to auscultation     Abdominal    Neurological - normal exam                 Anesthesia Plan    ASA 3   ASA physical status 3 criteria: other (comment)    Plan - general and peripheral nerve block     Peripheral nerve block will be post-op pain control  Airway plan will be LMA        Induction: intravenous      Pertinent diagnostic labs and testing reviewed    Informed Consent:    Anesthetic plan and risks discussed with patient.

## 2019-05-31 NOTE — ANESTHESIA TIME REPORT
Anesthesia Start and Stop Event Times     Date Time Event    5/31/2019 1258 Anesthesia Start     1448 Anesthesia Stop        Responsible Staff  05/31/19    Name Role Begin End    Shantanu Zhang M.D. Anesth 1258 1448        Preop Diagnosis (Free Text):  Pre-op Diagnosis     LEFT KNEE OSTEOARTHRITIS AND DEGENERATIVE JOINT DISEASE        Preop Diagnosis (Codes):  Diagnosis Information     Diagnosis Code(s):         Post op Diagnosis  Osteoarthritis of left knee      Premium Reason  Non-Premium    Comments:

## 2019-05-31 NOTE — ANESTHESIA PROCEDURE NOTES
Peripheral Block  Performed by: BÁRBARA PONCE  Authorized by: BÁRBARA PONCE     Start Time:  5/31/2019 12:40 PM  End Time:  5/31/2019 12:45 PM  Reason for Block: at surgeon's request and post-op pain management    patient identified, IV checked, site marked, risks and benefits discussed, surgical consent, monitors and equipment checked, pre-op evaluation and timeout performed    Patient Position:  Supine  Prep: ChloraPrep    Block Region:  Lower Extremity  Lower Extremity - Block Type:  Selective FEMORAL nerve block at the Adductor Canal    Laterality:  Left  Procedures: ultrasound guided  Image captured, interpreted and electronically stored.  Local Infiltration:  Lidocaine  Strength:  1 %  Dose:  3 ml  Block Type:  Single-shot  Needle Length:  100mm  Needle Gauge:  21 G  Needle Localization:  Ultrasound guidance  Injection Assessment:  Negative aspiration for heme, no paresthesia on injection, incremental injection and local visualized surrounding nerve on ultrasound

## 2019-05-31 NOTE — OR NURSING
POC reviewed with patient. Call light in reach. Instructed to call for any needs. Hourly rounding in place.     Multimodal medications given.

## 2019-05-31 NOTE — OR SURGEON
Immediate Post OP Note    PreOp Diagnosis: L knee oa djd    PostOp Diagnosis: same    Procedure(s):L  ARTHROPLASTY, KNEE, TOTAL - Wound Class: Clean    Surgeon(s):  Marco Antonio Friend M.D.    Anesthesiologist/Type of Anesthesia:  Anesthesiologist: Shantanu Zhang M.D./General    Surgical Staff:  Assistant: DELON Guerrero  Circulator: Sahra Hernandez R.N.; Jie Arenas R.N.  Limb [place]: Anabel Foster  Scrub Person: Chelly Green    Specimens removed if any:  none    Estimated Blood Loss: 75cc    Findings: none    Complications: none        5/31/2019 2:15 PM Marco Antonio Friend M.D.

## 2019-06-01 VITALS
OXYGEN SATURATION: 99 % | WEIGHT: 142.42 LBS | SYSTOLIC BLOOD PRESSURE: 102 MMHG | RESPIRATION RATE: 17 BRPM | HEART RATE: 64 BPM | BODY MASS INDEX: 24.31 KG/M2 | DIASTOLIC BLOOD PRESSURE: 54 MMHG | TEMPERATURE: 97.3 F | HEIGHT: 64 IN

## 2019-06-01 LAB
ERYTHROCYTE [DISTWIDTH] IN BLOOD BY AUTOMATED COUNT: 44.9 FL (ref 35.9–50)
HCT VFR BLD AUTO: 36.6 % (ref 37–47)
HGB BLD-MCNC: 11.7 G/DL (ref 12–16)
MCH RBC QN AUTO: 29.4 PG (ref 27–33)
MCHC RBC AUTO-ENTMCNC: 32 G/DL (ref 33.6–35)
MCV RBC AUTO: 92 FL (ref 81.4–97.8)
PLATELET # BLD AUTO: 257 K/UL (ref 164–446)
PMV BLD AUTO: 9.1 FL (ref 9–12.9)
RBC # BLD AUTO: 3.98 M/UL (ref 4.2–5.4)
WBC # BLD AUTO: 9.6 K/UL (ref 4.8–10.8)

## 2019-06-01 PROCEDURE — 700111 HCHG RX REV CODE 636 W/ 250 OVERRIDE (IP): Performed by: PHYSICIAN ASSISTANT

## 2019-06-01 PROCEDURE — A9270 NON-COVERED ITEM OR SERVICE: HCPCS | Performed by: PHYSICIAN ASSISTANT

## 2019-06-01 PROCEDURE — 97161 PT EVAL LOW COMPLEX 20 MIN: CPT

## 2019-06-01 PROCEDURE — 97165 OT EVAL LOW COMPLEX 30 MIN: CPT

## 2019-06-01 PROCEDURE — 700105 HCHG RX REV CODE 258

## 2019-06-01 PROCEDURE — 85027 COMPLETE CBC AUTOMATED: CPT

## 2019-06-01 PROCEDURE — 700112 HCHG RX REV CODE 229: Performed by: PHYSICIAN ASSISTANT

## 2019-06-01 PROCEDURE — 36415 COLL VENOUS BLD VENIPUNCTURE: CPT

## 2019-06-01 PROCEDURE — 700102 HCHG RX REV CODE 250 W/ 637 OVERRIDE(OP): Performed by: PHYSICIAN ASSISTANT

## 2019-06-01 RX ORDER — ASPIRIN 325 MG
325 TABLET, DELAYED RELEASE (ENTERIC COATED) ORAL 2 TIMES DAILY
Qty: 60 TAB | Refills: 0 | Status: SHIPPED | OUTPATIENT
Start: 2019-06-01 | End: 2019-07-01

## 2019-06-01 RX ORDER — OXYCODONE HYDROCHLORIDE 10 MG/1
10 TABLET ORAL
Qty: 56 TAB | Refills: 0 | Status: SHIPPED | OUTPATIENT
Start: 2019-06-01 | End: 2019-06-15

## 2019-06-01 RX ORDER — SODIUM CHLORIDE 9 MG/ML
INJECTION, SOLUTION INTRAVENOUS
Status: COMPLETED
Start: 2019-06-01 | End: 2019-06-01

## 2019-06-01 RX ADMIN — OXYCODONE HYDROCHLORIDE 10 MG: 5 TABLET ORAL at 10:43

## 2019-06-01 RX ADMIN — DOCUSATE SODIUM 100 MG: 100 CAPSULE, LIQUID FILLED ORAL at 05:32

## 2019-06-01 RX ADMIN — CEFAZOLIN SODIUM 2 G: 2 INJECTION, SOLUTION INTRAVENOUS at 05:32

## 2019-06-01 RX ADMIN — KETOROLAC TROMETHAMINE 30 MG: 30 INJECTION, SOLUTION INTRAMUSCULAR; INTRAVENOUS at 05:32

## 2019-06-01 RX ADMIN — ZOLPIDEM TARTRATE 5 MG: 5 TABLET ORAL at 00:00

## 2019-06-01 RX ADMIN — SODIUM CHLORIDE 500 ML: 9 INJECTION, SOLUTION INTRAVENOUS at 06:46

## 2019-06-01 RX ADMIN — OXYCODONE HYDROCHLORIDE 10 MG: 5 TABLET ORAL at 05:38

## 2019-06-01 RX ADMIN — KETOROLAC TROMETHAMINE 30 MG: 30 INJECTION, SOLUTION INTRAMUSCULAR; INTRAVENOUS at 11:48

## 2019-06-01 RX ADMIN — ACETAMINOPHEN 1000 MG: 500 TABLET ORAL at 05:32

## 2019-06-01 RX ADMIN — ACETAMINOPHEN 1000 MG: 500 TABLET ORAL at 11:48

## 2019-06-01 RX ADMIN — ASPIRIN 325 MG: 325 TABLET, COATED ORAL at 03:02

## 2019-06-01 RX ADMIN — ACETAMINOPHEN 1000 MG: 500 TABLET ORAL at 01:16

## 2019-06-01 RX ADMIN — KETOROLAC TROMETHAMINE 30 MG: 30 INJECTION, SOLUTION INTRAMUSCULAR; INTRAVENOUS at 01:17

## 2019-06-01 ASSESSMENT — PATIENT HEALTH QUESTIONNAIRE - PHQ9
1. LITTLE INTEREST OR PLEASURE IN DOING THINGS: NOT AT ALL
SUM OF ALL RESPONSES TO PHQ9 QUESTIONS 1 AND 2: 0
2. FEELING DOWN, DEPRESSED, IRRITABLE, OR HOPELESS: NOT AT ALL

## 2019-06-01 ASSESSMENT — LIFESTYLE VARIABLES
TOTAL SCORE: 1
HAVE PEOPLE ANNOYED YOU BY CRITICIZING YOUR DRINKING: NO
HAVE YOU EVER FELT YOU SHOULD CUT DOWN ON YOUR DRINKING: YES
EVER FELT BAD OR GUILTY ABOUT YOUR DRINKING: NO
ALCOHOL_USE: YES
EVER_SMOKED: YES
ON A TYPICAL DAY WHEN YOU DRINK ALCOHOL HOW MANY DRINKS DO YOU HAVE: 2
EVER HAD A DRINK FIRST THING IN THE MORNING TO STEADY YOUR NERVES TO GET RID OF A HANGOVER: NO
HAVE YOU EVER FELT YOU SHOULD CUT DOWN ON YOUR DRINKING: YES
HAVE PEOPLE ANNOYED YOU BY CRITICIZING YOUR DRINKING: NO
TOTAL SCORE: 1
TOTAL SCORE: 1
ON A TYPICAL DAY WHEN YOU DRINK ALCOHOL HOW MANY DRINKS DO YOU HAVE: 2
EVER HAD A DRINK FIRST THING IN THE MORNING TO STEADY YOUR NERVES TO GET RID OF A HANGOVER: NO
TOTAL SCORE: 1
CONSUMPTION TOTAL: INCOMPLETE
CONSUMPTION TOTAL: INCOMPLETE
CONSUMPTION TOTAL: POSITIVE
AVERAGE NUMBER OF DAYS PER WEEK YOU HAVE A DRINK CONTAINING ALCOHOL: 3
TOTAL SCORE: 1
HOW MANY TIMES IN THE PAST YEAR HAVE YOU HAD 5 OR MORE DRINKS IN A DAY: 1000
TOTAL SCORE: 1
AVERAGE NUMBER OF DAYS PER WEEK YOU HAVE A DRINK CONTAINING ALCOHOL: 3
ALCOHOL_USE: NO
EVER FELT BAD OR GUILTY ABOUT YOUR DRINKING: NO
HOW MANY TIMES IN THE PAST YEAR HAVE YOU HAD 5 OR MORE DRINKS IN A DAY: 1000

## 2019-06-01 ASSESSMENT — ACTIVITIES OF DAILY LIVING (ADL): TOILETING: INDEPENDENT

## 2019-06-01 ASSESSMENT — COGNITIVE AND FUNCTIONAL STATUS - GENERAL
DAILY ACTIVITIY SCORE: 24
WALKING IN HOSPITAL ROOM: A LITTLE
MOVING TO AND FROM BED TO CHAIR: A LITTLE
MOBILITY SCORE: 18
STANDING UP FROM CHAIR USING ARMS: A LITTLE
TURNING FROM BACK TO SIDE WHILE IN FLAT BAD: A LITTLE
MOVING FROM LYING ON BACK TO SITTING ON SIDE OF FLAT BED: A LITTLE
SUGGESTED CMS G CODE MODIFIER DAILY ACTIVITY: CH
CLIMB 3 TO 5 STEPS WITH RAILING: A LITTLE
SUGGESTED CMS G CODE MODIFIER MOBILITY: CK

## 2019-06-01 ASSESSMENT — GAIT ASSESSMENTS
GAIT LEVEL OF ASSIST: STAND BY ASSIST
ASSISTIVE DEVICE: FRONT WHEEL WALKER
DISTANCE (FEET): 100

## 2019-06-01 NOTE — THERAPY
"Occupational Therapy Evaluation completed.   Functional Status:  Up w/RN in room, spv walking w/fww to bathroom, spv w/toilet txf and toileting as well as standing at sink for grooming, returned to chair completed LB dressing w/spv. Educated on adaptive dressing techniques. Walked in room and hallway w/fww and no overt c/o pain educated on home safety and fall prevention. Txf to chair w/spv and remained in chair RN aware   Plan of Care: Patient with no further skilled OT needs in the acute care setting at this time  Discharge Recommendations:  Equipment: Front-Wheel Walker. Post-acute therapy Anticipate that the patient will have no further occupational therapy needs after discharge from the hospital.       See \"Rehab Therapy-Acute\" Patient Summary Report for complete documentation.      63 yr old female admitted for LTKA pt is POD#1 and doing well pt demonstrated ability to complete ADL's and txfs w/o assist pt reports friend and family support at d/c at this time anticipate no further acute OT needs   "

## 2019-06-01 NOTE — ANESTHESIA POSTPROCEDURE EVALUATION
Patient: Kendra Gregorio Vianey    Procedure Summary     Date:  05/31/19 Room / Location:  [facility] OR 04 / SURGERY [facility]    Anesthesia Start:  1258 Anesthesia Stop:  1448    Procedure:  ARTHROPLASTY, KNEE, TOTAL (Left Knee) Diagnosis:  (LEFT KNEE OSTEOARTHRITIS AND DEGENERATIVE JOINT DISEASE)    Surgeon:  Marco Antonio Friend M.D. Responsible Provider:  Shantanu Zhang M.D.    Anesthesia Type:  general, peripheral nerve block ASA Status:  3          Final Anesthesia Type: general, peripheral nerve block  Last vitals  BP   Blood Pressure: 100/54, NIBP: 120/72    Temp   37 °C (98.6 °F)    Pulse   Pulse: 61, Heart Rate (Monitored): 93   Resp   17    SpO2   99 %      Anesthesia Post Evaluation    Patient location during evaluation: PACU  Patient participation: complete - patient participated  Level of consciousness: awake and alert    Airway patency: patent  Anesthetic complications: no  Cardiovascular status: hemodynamically stable  Respiratory status: acceptable  Hydration status: euvolemic    PONV: none           Nurse Pain Score: 7 (NPRS)

## 2019-06-01 NOTE — CARE PLAN
Problem: Risk for Impaired Mobility  Goal: Mobilization  Outcome: PROGRESSING AS EXPECTED    Intervention: No pillow under affected knee  Pt educated on no pillow under knee. Pt verbalized understanding. Pillow under ankle      Problem: Pain  Goal: Alleviation of Pain or a reduction in pain to the patient's comfort goal  Outcome: PROGRESSING AS EXPECTED    Intervention: Pain Management-Medications  Oxy and morphine available. Medicated per MAR. Discussed distraction techniques other than meds

## 2019-06-01 NOTE — DISCHARGE SUMMARY
DATE OF ADMISSION:  05/31/2019    DATE OF DISCHARGE:  06/01/2019    DISCHARGE DIAGNOSIS:  Status post left total knee arthroplasty for   degenerative joint disease, end-stage osteoarthritis.    DISCHARGE MEDICATIONS:  Include oxycodone 10 mg 1 p.o. q. 3 hours p.r.n. pain   and aspirin 325 one p.o. b.i.d. x30 days.     FOLLOWUP:  She is to follow up with me, Dr. Marco Antonio Friend, [phone] in   approximately 2 weeks.    HISTORY OF PRESENT ILLNESS AND HOSPITAL COURSE:  Patient is a 63-year-old   female with chief complaint of left knee pain.  She failed conservative   treatment.  Because of this was scheduled for surgery.  Surgery took place the   day of admission, but without complication.  Postoperatively, she has passed   physical therapy.  She has met all nursing and physical therapy criteria to be   sent home.  Her pain is well controlled on oral medication and she has had no   operative or postoperative complications.  She is weightbearing as tolerated   and will be given dressings for dressing changes.  We will see her back in our   clinic in 2 weeks.       ____________________________________     MD ALEC GANT / WAYNE    DD:  06/01/2019 13:18:39  DT:  06/01/2019 13:24:59    D#:  [id number]  Job#:  [id number]

## 2019-06-01 NOTE — PROGRESS NOTES
Progress Note               Author: Marco Antonio ORLANDO Erazoon Date & Time created: 2019  1:07 PM     Interval History:  Pt doing extremely well    Review of Systems:  ROS    Physical Exam:  Physical Exam   Musculoskeletal:   Sensation and motor intact b le  Dressing dry       Labs:          No results for input(s): SODIUM, POTASSIUM, CHLORIDE, CO2, BUN, CREATININE, MAGNESIUM, PHOSPHORUS, CALCIUM in the last 72 hours.  No results for input(s): ALTSGPT, ASTSGOT, ALKPHOSPHAT, TBILIRUBIN, DBILIRUBIN, GAMMAGT, AMYLASE, LIPASE, ALB, PREALBUMIN, GLUCOSE in the last 72 hours.  Recent Labs      19   0313   RBC  3.98*   HEMOGLOBIN  11.7*   HEMATOCRIT  36.6*   PLATELETCT  257     Recent Labs      19   0313   WBC  9.6     Hemodynamics:  Temp (24hrs), Av.8 °C (98.3 °F), Min:36.3 °C (97.3 °F), Max:37.3 °C (99.2 °F)  Temperature: 36.9 °C (98.5 °F)  Pulse  Av.9  Min: 61  Max: 94Heart Rate (Monitored): 93  Blood Pressure: 131/61, NIBP: 120/72     Respiratory:    Respiration: 15, Pulse Oximetry: 92 %        RUL Breath Sounds: Clear, RML Breath Sounds: Clear, RLL Breath Sounds: Diminished, JULIO Breath Sounds: Clear, LLL Breath Sounds: Diminished  Fluids:    Intake/Output Summary (Last 24 hours) at 19 1307  Last data filed at 19 1000   Gross per 24 hour   Intake             1780 ml   Output               75 ml   Net             1705 ml        GI/Nutrition:  Orders Placed This Encounter   Procedures   • Diet Order Regular     Standing Status:   Standing     Number of Occurrences:   1     Order Specific Question:   Diet:     Answer:   Regular [1]     Medical Decision Making, by Problem:  There are no active hospital problems to display for this patient.      Plan:  Pt has passed physical therapy  Dc to home    Quality-Core Measures

## 2019-06-01 NOTE — PROGRESS NOTES
Pt accepted from PACU, a&ox4, vss, no c/o pain, awaiting void, tolerating fluids, no c/o n/v, dressing to left knee cdi.

## 2019-06-01 NOTE — DISCHARGE INSTRUCTIONS
Discharge Instructions    Discharged to home by car with relative. Discharged via wheelchair, hospital escort: Yes.  Special equipment needed: Walker    Be sure to schedule a follow-up appointment with your primary care doctor or any specialists as instructed.     Discharge Plan:   Diet Plan: Discussed  Activity Level: Discussed  Confirmed Follow up Appointment: Patient to Call and Schedule Appointment  Confirmed Symptoms Management: Discussed  Medication Reconciliation Updated: Yes  Influenza Vaccine Indication: Patient Refuses    I understand that a diet low in cholesterol, fat, and sodium is recommended for good health. Unless I have been given specific instructions below for another diet, I accept this instruction as my diet prescription.   Other diet: Regular    Special Instructions: Discharge instructions for the Orthopedic Patient    Follow up with Primary Care Physician within 2 weeks of discharge to home, regarding:  Review of medications and diagnostic testing.  Surveillance for medical complications.  Workup and treatment of osteoporosis, if appropriate.     -Is this a Joint Replacement patient? Yes Total Joint Knee Replacement Discharge Instructions    Pain  - The goal is to slowly wean off the prescription pain medicine.  - Ice can be used for pain control.  20 minutes at a time is recommended, and never directly against your skin or incision.  - Most patients are off the pain pills by 3 weeks; others may require a low level of pain medications for many months.  If your pain continues to be severe, follow up with your physician.  Infection    Knee joint infections; occur in fewer than 2% of patients. The most common causes of infection following total knee replacement surgery are from bacteria that enter the bloodstream during dental procedures, urinary tract infections, or skin infections. These bacteria can lodge around your knee replacement and cause an infection.  - Keep the incision as clean and  dry as possible.  - Always wash your hands before touching your incision.  - Skin infections tend to develop around 7-10 days after surgery; most can be treated with oral antibiotics.  - Dental Care should be delayed for 3 months after surgery, your surgeon recommends taking a dose of antibiotics 1 hour prior to any dental procedure. After 2 years, most surgeons recommend antibiotics only before an extensive procedure.  Ask your surgeon what he recommends.  - Signs and symptoms of infection can include:  low grade fever, redness, pain, swelling and drainage from your incision.  Notify your surgeon immediately if you develop any of these symptoms.  Other instructions  - Bowel habits - constipation is extremely common and is caused by a combination of anesthesia, lack of mobility and pain medicine.  Use stool softeners or laxatives if necessary. It is important not to ignore this problem, as bowel obstructions can be a serious complication after joint replacement surgery.  - Mood/Energy Level - Many patients experience a lack of energy and endurance for up to 2-3 months after surgery.  Some may also feel down and can even become depressed.  This is likely due to the postoperative anemia, change in activity level, lack of sleep, pain medicine and just the emotional reaction to the surgery itself that is a big disruption in a person’s life.  This usually passes.  If symptoms persist, follow up with your primary physician.  - Returning to work - Your surgeon will give you more specific instructions. Depending on the type of activities you perform, it may be 6 to 8 weeks before you return to work.   Generally, if you work a sedentary job requiring little standing or walking, most patients may return within 2-6 weeks.  Manual labor jobs involving walking, lifting and standing may take longer. Your surgeon’s office can provide a release to part-time or light duty work early on in your recovery and progress you to full duty  as able.    - Driving - If your left knee was replaced and you have an automatic transmission, you may be able to begin driving in a week or so, provided you are no longer taking narcotic pain medication. If your right knee was replaced, avoid driving for 6 to 8 weeks. Remember that your reflexes may not be as sharp as before your surgery. Ask your surgeon for specific instructions.   - Avoiding falls - A fall during the first few weeks after surgery can damage your new knee and may result in a need for further surgery.   throw rugs and tack down loose carpeting.  Be aware of floor hazards such as pets, small objects or uneven surfaces.    - [employer] Detectors - The sensitivity of metal detectors varies and it is likely that your prosthesis will cause an alarm.  Inform the  of your artificial joint.  Diet  - Resume your normal diet as tolerated.  - It is important to achieve a healthy nutritional status by eating a well balanced diet on a regular basis.  - Your physician may recommend that you take iron and vitamin supplements.   - Continue to drink plenty of fluids.  Shower/Bathing  - You may shower as soon as you get home from the hospital unless otherwise instructed.  - Keep your incision out of water.  To keep the incision dry when showering, cover it with a plastic bag or plastic wrap.  - Pat incision dry if it gets wet.  Don’t rub.  - Do not submerge in a bath until staples are out and the incision is completely healed. (Approximately 6-8 weeks)  Dressing Change:  Procedure (if recommended by your physician)  - Wash hands.  - Open all new dressing change materials.  - Remove old dressing and discard.  - Inspect incision for redness, increase in clear drainage, yellow/green drainage, odor and surrounding skin hot to touch.  -  ABD (large gauze) pad or “island dressing” by one corner and lay over the incision.  Be careful not to touch the inside of the dressing that will lay  over the incision.  - Secure in place as instructed (Ace wrap or tape).    Swelling/Bruising    - Swelling can last from 3-6 months.  - Elevate your leg higher than your heart while reclining.   The first week you are home you should elevate your leg an equal amount of time, as you are active.    - Anti-inflammatory pills can be taken once you have stopped the blood thinners.  - The swelling is usually worse after you go home since you are upright for longer periods of time.  - Bruising is common and can involve the entire leg including the thigh, calf and even foot. Bruising often does not appear until after you arrive home and it can be quite dramatic- purple, black, and green.  The bruising you can see is not usually concerning and will subside without any treatment.      Blood Clot Prevention  Blood clots in the legs and the less common, but frightening, clots that travel to the lungs are a real focus of our preventative. Most patients are at standard risk for them, but those patients who are at higher risk include people who have had previous clots, a family history of clotting, smoking, diabetes, obesity, advanced age, use of estrogen and a sedentary lifestyle.    - Signs of blood clots in legs - Swelling in thigh, calf or ankle that does not go down with elevation.  Pain, heat and tenderness in calf, back of calf or groin area.  NOTE: blood clots can occur in either leg.  - You have been receiving anticoagulant therapy (blood thinners) in the hospital and you may be instructed to continue at home depending on your risk factors.  - Your risk for developing a clot continues for up to 2-3 months after surgery.  You should avoid prolonged sitting and dehydration during that time (long air trips and car trips).  If you do take a trip during this time, please get up and move around every 1- 1.5 hours.  - If you are prescribed blood-thinning medication for home, follow instructions as directed. (Handouts provided if  applicable).      Activity  Once home, you should continue to stay active. The key is to remember not to overdo it! While you can expect some good days and some bad days, you should notice a gradual improvement and a gradual increase in your endurance over the next 6 to 12 months. Exercise is a critical component of home care, particularly during the first few weeks after surgery.     - Normal activities of daily living You should be able to resume most within 3 to 6 weeks following surgery. Some pain with activity and at night is common for several weeks after surgery  -  Physical Therapy Exercises - Continue to do the exercises prescribed for at least two months after surgery. Riding a stationary bicycle can help maintain muscle tone and keep your knee flexible. Try to achieve the maximum degree of bending and extension possible. (handout provided by Therapist).  - Sexual Activity -. Your surgeon can tell you when it safe to resume sexual activity.    - Sleeping Positions - You can safely sleep on your back, on either side, or on your stomach.   - Other Activities - Walk as much as you like, but remember that walking is no substitute for the exercises your doctor and physical therapist will prescribe. Lower impact activities are preferred.  If you have specific questions, consult your Surgeon.    When to Call the Doctor   Call the physician if:   - Fever over 100.5? F  - Increased pain, drainage, redness, odor or heat around the incision area  - Shaking chills  - Increased knee pain with activity and rest  - Increased pain in calf, tenderness or redness above or below the knee  - Increased swelling of calf, ankle, foot  - Sudden increased shortness of breath, sudden onset of chest pain, localized chest pain with coughing  - Incision opening  Or, if there are any questions or concerns about medications or care.       -Is this patient being discharged with medication to prevent blood clots?  Yes, Aspirin Aspirin,  ASA oral tablets  What is this medicine?  ASPIRIN (AS pir in) is a pain reliever. It is used to treat mild pain and fever. This medicine is also used as directed by a doctor to prevent and to treat heart attacks, to prevent strokes, and to treat arthritis or inflammation.  This medicine may be used for other purposes; ask your health care provider or pharmacist if you have questions.  COMMON BRAND NAME(S): Aspir-Low, Aspir-Anjelica, Aspirtab, Kelvin Advanced Aspirin, Kelvin Aspirin, Kelvin Aspirin Extra Strength, Kelvin Aspirin Plus, Kelvin Extra Strength, Kelvin Extra Strength Plus, Kelvin Genuine Aspirin, Kelvin Womens Aspirin, Bufferin, Bufferin Extra Strength, Bufferin Low Dose  What should I tell my health care provider before I take this medicine?  They need to know if you have any of these conditions:  -anemia  -asthma  -bleeding problems  -child with chickenpox, the flu, or other viral infection  -diabetes  -gout  -if you frequently drink alcohol containing drinks  -kidney disease  -liver disease  -low level of vitamin K  -lupus  -smoke tobacco  -stomach ulcers or other problems  -an unusual or allergic reaction to aspirin, tartrazine dye, other medicines, dyes, or preservatives  -pregnant or trying to get pregnant  -breast-feeding  How should I use this medicine?  Take this medicine by mouth with a glass of water. Follow the directions on the package or prescription label. You can take this medicine with or without food. If it upsets your stomach, take it with food. Do not take your medicine more often than directed.  Talk to your pediatrician regarding the use of this medicine in children. While this drug may be prescribed for children as young as 12 years of age for selected conditions, precautions do apply. Children and teenagers should not use this medicine to treat chicken pox or flu symptoms unless directed by a doctor.  Patients over 65 years old may have a stronger reaction and need a smaller dose.  Overdosage:  If you think you have taken too much of this medicine contact a poison control center or emergency room at once.  NOTE: This medicine is only for you. Do not share this medicine with others.  What if I miss a dose?  If you are taking this medicine on a regular schedule and miss a dose, take it as soon as you can. If it is almost time for your next dose, take only that dose. Do not take double or extra doses.  What may interact with this medicine?  Do not take this medicine with any of the following medications:  -cidofovir  -ketorolac  -probenecid  This medicine may also interact with the following medications:  -alcohol  -alendronate  -bismuth subsalicylate  -flavocoxid  -herbal supplements like feverfew, garlic, andre, ginkgo biloba, horse chestnut  -medicines for diabetes or glaucoma like acetazolamide, methazolamide  -medicines for gout  -medicines that treat or prevent blood clots like enoxaparin, heparin, ticlopidine, warfarin  -other aspirin and aspirin-like medicines  -NSAIDs, medicines for pain and inflammation, like ibuprofen or naproxen  -pemetrexed  -sulfinpyrazone  -varicella live vaccine  This list may not describe all possible interactions. Give your health care provider a list of all the medicines, herbs, non-prescription drugs, or dietary supplements you use. Also tell them if you smoke, drink alcohol, or use illegal drugs. Some items may interact with your medicine.  What should I watch for while using this medicine?  If you are treating yourself for pain, tell your doctor or health care professional if the pain lasts more than 10 days, if it gets worse, or if there is a new or different kind of pain. Tell your doctor if you see redness or swelling. Also, check with your doctor if you have a fever that lasts for more than 3 days. Only take this medicine to prevent heart attacks or blood clotting if prescribed by your doctor or health care professional.  Do not take aspirin or aspirin-like  medicines with this medicine. Too much aspirin can be dangerous. Always read the labels carefully.  This medicine can irritate your stomach or cause bleeding problems. Do not smoke cigarettes or drink alcohol while taking this medicine. Do not lie down for 30 minutes after taking this medicine to prevent irritation to your throat.  If you are scheduled for any medical or dental procedure, tell your healthcare provider that you are taking this medicine. You may need to stop taking this medicine before the procedure.  This medicine may be used to treat migraines. If you take migraine medicines for 10 or more days a month, your migraines may get worse. Keep a diary of headache days and medicine use. Contact your healthcare professional if your migraine attacks occur more frequently.  What side effects may I notice from receiving this medicine?  Side effects that you should report to your doctor or health care professional as soon as possible:  -allergic reactions like skin rash, itching or hives, swelling of the face, lips, or tongue  -breathing problems  -changes in hearing, ringing in the ears  -confusion  -general ill feeling or flu-like symptoms  -pain on swallowing  -redness, blistering, peeling or loosening of the skin, including inside the mouth or nose  -signs and symptoms of bleeding such as bloody or black, tarry stools; red or dark-brown urine; spitting up blood or brown material that looks like coffee grounds; red spots on the skin; unusual bruising or bleeding from the eye, gums, or nose  -trouble passing urine or change in the amount of urine  -unusually weak or tired  -yellowing of the eyes or skin  Side effects that usually do not require medical attention (report to your doctor or health care professional if they continue or are bothersome):  -diarrhea or constipation  -headache  -nausea, vomiting  -stomach gas, heartburn  This list may not describe all possible side effects. Call your doctor for  medical advice about side effects. You may report side effects to FDA at 4-487-FDA-9043.  Where should I keep my medicine?  Keep out of the reach of children.  Store at room temperature between 15 and 30 degrees C (59 and 86 degrees F). Protect from heat and moisture. Do not use this medicine if it has a strong vinegar smell. Throw away any unused medicine after the expiration date.  NOTE: This sheet is a summary. It may not cover all possible information. If you have questions about this medicine, talk to your doctor, pharmacist, or health care provider.  © 2018 [employer] (2014-08-19 11:30:31)      · Is patient discharged on Warfarin / Coumadin?   No     Oxycodone tablets or capsules  What is this medicine?  OXYCODONE (ox i KOE done) is a pain reliever. It is used to treat moderate to severe pain.  This medicine may be used for other purposes; ask your health care provider or pharmacist if you have questions.  COMMON BRAND NAME(S): Dazidox, Endocodone, Oxaydo, OXECTA, OxyIR, Percolone, Roxicodone, ROXYBOND  What should I tell my health care provider before I take this medicine?  They need to know if you have any of these conditions:  -[place]'s disease  -brain tumor  -head injury  -heart disease  -history of drug or alcohol abuse problem  -if you often drink alcohol  -kidney disease  -liver disease  -lung or breathing disease, like asthma  -mental illness  -pancreatic disease  -seizures  -thyroid disease  -an unusual or allergic reaction to oxycodone, codeine, hydrocodone, morphine, other medicines, foods, dyes, or preservatives  -pregnant or trying to get pregnant  -breast-feeding  How should I use this medicine?  Take this medicine by mouth with a glass of water. Follow the directions on the prescription label. You can take it with or without food. If it upsets your stomach, take it with food. Take your medicine at regular intervals. Do not take it more often than directed. Do not stop taking except  on your doctor's advice.  Some brands of this medicine, like Oxecta, have special instructions. Ask your doctor or pharmacist if these directions are for you: Do not cut, crush or chew this medicine. Swallow only one tablet at a time. Do not wet, soak, or lick the tablet before you take it.  A special MedGuide will be given to you by the pharmacist with each prescription and refill. Be sure to read this information carefully each time.  Talk to your pediatrician regarding the use of this medicine in children. Special care may be needed.  Overdosage: If you think you have taken too much of this medicine contact a poison control center or emergency room at once.  NOTE: This medicine is only for you. Do not share this medicine with others.  What if I miss a dose?  If you miss a dose, take it as soon as you can. If it is almost time for your next dose, take only that dose. Do not take double or extra doses.  What may interact with this medicine?  This medicine may interact with the following medications:  -alcohol  -antihistamines for allergy, cough and cold  -antiviral medicines for HIV or AIDS  -atropine  -certain antibiotics like clarithromycin, erythromycin, linezolid, rifampin  -certain medicines for anxiety or sleep  -certain medicines for bladder problems like oxybutynin, tolterodine  -certain medicines for depression like amitriptyline, fluoxetine, sertraline  -certain medicines for fungal infections like ketoconazole, itraconazole, voriconazole  -certain medicines for migraine headache like almotriptan, eletriptan, frovatriptan, naratriptan, rizatriptan, sumatriptan, zolmitriptan  -certain medicines for nausea or vomiting like dolasetron, ondansetron, palonosetron  -certain medicines for Parkinson's disease like benztropine, trihexyphenidyl  -certain medicines for seizures like phenobarbital, phenytoin, primidone  -certain medicines for stomach problems like dicyclomine, hyoscyamine  -certain medicines for  travel sickness like scopolamine  -diuretics  -general anesthetics like halothane, isoflurane, methoxyflurane, propofol  -ipratropium  -local anesthetics like lidocaine, pramoxine, tetracaine  -MAOIs like Carbex, Eldepryl, Marplan, Nardil, and Parnate  -medicines that relax muscles for surgery  -methylene blue  -nilotinib  -other narcotic medicines for pain or cough  -phenothiazines like chlorpromazine, mesoridazine, prochlorperazine, thioridazine  This list may not describe all possible interactions. Give your health care provider a list of all the medicines, herbs, non-prescription drugs, or dietary supplements you use. Also tell them if you smoke, drink alcohol, or use illegal drugs. Some items may interact with your medicine.  What should I watch for while using this medicine?  Tell your doctor or health care professional if your pain does not go away, if it gets worse, or if you have new or a different type of pain. You may develop tolerance to the medicine. Tolerance means that you will need a higher dose of the medicine for pain relief. Tolerance is normal and is expected if you take this medicine for a long time.  Do not suddenly stop taking your medicine because you may develop a severe reaction. Your body becomes used to the medicine. This does NOT mean you are addicted. Addiction is a behavior related to getting and using a drug for a non-medical reason. If you have pain, you have a medical reason to take pain medicine. Your doctor will tell you how much medicine to take. If your doctor wants you to stop the medicine, the dose will be slowly lowered over time to avoid any side effects.  There are different types of narcotic medicines (opiates). If you take more than one type at the same time or if you are taking another medicine that also causes drowsiness, you may have more side effects. Give your health care provider a list of all medicines you use. Your doctor will tell you how much medicine to take.  Do not take more medicine than directed. Call emergency for help if you have problems breathing or unusual sleepiness.  You may get drowsy or dizzy. Do not drive, use machinery, or do anything that needs mental alertness until you know how the medicine affects you. Do not stand or sit up quickly, especially if you are an older patient. This reduces the risk of dizzy or fainting spells. Alcohol may interfere with the effect of this medicine. Avoid alcoholic drinks.  This medicine will cause constipation. Try to have a bowel movement at least every 2 to 3 days. If you do not have a bowel movement for 3 days, call your doctor or health care professional.  Your mouth may get dry. Chewing sugarless gum or sucking hard candy, and drinking plenty of water may help. Contact your doctor if the problem does not go away or is severe.  What side effects may I notice from receiving this medicine?  Side effects that you should report to your doctor or health care professional as soon as possible:  -allergic reactions like skin rash, itching or hives, swelling of the face, lips, or tongue  -breathing problems  -confusion  -signs and symptoms of low blood pressure like dizziness; feeling faint or lightheaded, falls; unusually weak or tired  -trouble passing urine or change in the amount of urine  -trouble swallowing  Side effects that usually do not require medical attention (report to your doctor or health care professional if they continue or are bothersome):  -constipation  -dry mouth  -nausea, vomiting  -tiredness  This list may not describe all possible side effects. Call your doctor for medical advice about side effects. You may report side effects to FDA at 5-047-FDA-5644.  Where should I keep my medicine?  Keep out of the reach of children. This medicine can be abused. Keep your medicine in a safe place to protect it from theft. Do not share this medicine with anyone. Selling or giving away this medicine is dangerous and  against the law.  Store at room temperature between 15 and 30 degrees C (59 and 86 degrees F). Protect from light. Keep container tightly closed.  This medicine may cause accidental overdose and death if it is taken by other adults, children, or pets. Flush any unused medicine down the toilet to reduce the chance of harm. Do not use the medicine after the expiration date.  NOTE: This sheet is a summary. It may not cover all possible information. If you have questions about this medicine, talk to your doctor, pharmacist, or health care provider.  © 2018 [employer] (2016-11-01 16:55:57)      Depression / Suicide Risk    As you are discharged from this [facility] facility, it is important to learn how to keep safe from harming yourself.    Recognize the warning signs:  · Abrupt changes in personality, positive or negative- including increase in energy   · Giving away possessions  · Change in eating patterns- significant weight changes-  positive or negative  · Change in sleeping patterns- unable to sleep or sleeping all the time   · Unwillingness or inability to communicate  · Depression  · Unusual sadness, discouragement and loneliness  · Talk of wanting to die  · Neglect of personal appearance   · Rebelliousness- reckless behavior  · Withdrawal from people/activities they love  · Confusion- inability to concentrate     If you or a loved one observes any of these behaviors or has concerns about self-harm, here's what you can do:  · Talk about it- your feelings and reasons for harming yourself  · Remove any means that you might use to hurt yourself (examples: pills, rope, extension cords, firearm)  · Get professional help from the community (Mental Health, Substance Abuse, psychological counseling)  · Do not be alone:Call your Safe Contact- someone whom you trust who will be there for you.  · Call your local CRISIS HOTLINE 294-8431 or 325-057-9437  · Call your local Children's Mobile Crisis Response Team  [facility] (874) 736-3775 or www.[employer]  · Call the toll free National Suicide Prevention Hotlines   · National Suicide Prevention Lifeline 447-362-YDPA (7492)  · National Hope Line Network 800-SUICIDE (765-6965)

## 2019-06-01 NOTE — OR NURSING
Patient awake and alert and tolerating sips of water without issue. Pt has had some juice for ERAS and no issues noted. Pain is mild and no nausea reported. Pt has been updated on plan of care and all questions have been answered. VSS. Dressing to left knee is CDI. Pt has 10 Oxymask in place for ERAS protocol and ERAS fluids running. Both can be dc'd at 2100. Will continue to monitor.

## 2019-06-01 NOTE — CARE PLAN
Problem: Discharge Barriers/Planning  Goal: Patient's Continuum of care needs are met    Intervention: Involve patient/significant other in discharge process  Patient cleared by physical and occupational therapy, pending medical clearance, discharge planning in progress.       Problem: Risk for Impaired Mobility  Goal: Mobilization    Intervention: Collaborate with PT/OT regarding mobilization and Range of Motion  Patient ambulated, stand by assist, weight bearing as tolerated, tolerates well.

## 2019-06-02 NOTE — THERAPY
"Physical Therapy Evaluation completed.   Bed Mobility:  Supine to Sit: Independent  Transfers: Sit to Stand: Supervised  Gait: Level Of Assist: Stand by Assist with Front-Wheel Walker       Plan of Care: Patient with no further skilled PT needs in the acute care setting at this time  Discharge Recommendations: Equipment: Front-Wheel Walker. See below    Pt presents to PT with limited ROM and slight swelling in her L knee 2' to L TKA. Pt is able to perform bed mobility and transfers with spv, and can ambulate 100 ft and ascend/descend 2 stairs with SBA using a FWW. Pt would benefit from outpatient PT after DC to home. Spoke to nsg about worries re; pt scheduling outpatient PT post dc. Pt demonstrated understanding of HEP and understands the importance of maintaining/improving her ROM and inflammation in her L knee. Pt is functionally able to dc home at this time.     See \"Rehab Therapy-Acute\" Patient Summary Report for complete documentation.     "

## 2019-06-07 NOTE — H&P
SUBJECTIVE:  The patient is a 63-year-old female, weighs a 142 pounds.  The   patient has been seen for surgical consultation today, complex consultation.   Reviewed his all medical reviews with the interpretation of all imaging   studies including MRI scans and x-rays, obtaining and conducting physical   examination of multiple body systems including musculoskeletal, neurologic,   vascular.  She has complex decision making regarding involving surgical   treatment options ____ comprehensive decision in discussion with greater than   60 minutes allocated for his history and physical examination.    CHIEF COMPLAINT:  Left knee pain.  The patient's height is 64 inches and with   weight combination, she is approximately a BMI of 23 within normal range,   afebrile.  Blood pressure was 134/82, pre-hypertensive, follow up with primary   care, diet and exercise.  She is a nontobacco user.  The patient is not   between the ages of 65-85, so no osteoporosis consultation, pain presently   with 8/10 severe left knee pain.    HISTORY OF PRESENT ILLNESS:  Left knee pain, severe.  No specific injury   caused the pain.  The patient has been dealing with the pain on and off for a   few years.  No nonoperative treatment other than physical therapy and multiple   steroid injections.  All have resulted in failed conservative treatment.  Dr. Friend replaced her right knee approximately 5 years ago and has been   resolving it well and very successful operation on the right side.  She cannot   say anything good enough about how much her right knee feels.    PAST MEDICAL HISTORY:  Consistent with hypertension, prone to Clostridium   difficile, hyperlipidemia.    PAST SURGICAL HISTORY:  Right total knee, right knee arthroscopy, left knee   arthroscopy, fecal transplant, right wrist surgery and hemorrhoid removal.    Denies any past spine history.    ALLERGIES:  No known drug allergies whatsoever other than she does have a   latex  allergy.    FAMILY HISTORY:  Cancer, heart disease and hypertension.    SOCIAL HISTORY:   with 3 children, 1 child , maybe drinks wine   3-4 times a week.    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  ____.    REVIEW OF SYSTEMS:  Only positive for hypertension.  All other 10-point review   of systems are negative.    PHYSICAL EXAMINATION  EXTREMITIES:  The patient primarily has painful right knee on joint line   itself, medial, lateral and anterior.  Pain with all range of motion,   proximally flexion and extension at 0 degrees to 130.  Inspection revealed a   large scar from the lateral portion of the anterior knee.  She had an   operation 19 years ago, I believe, with internal derangement meniscus repair   that was open before they did arthroscopies.  She has very, very painful   positive Vidhya sign, stable valgus varus, negative anteriorposterior drawer   examination, negative painful patellar grind.    IMAGING REVIEW:  X-rays on the left knee showed that there is severe bone   deformity in the medial and lateral joint as well as the patellofemoral   compartment as well.  There is subluxation approximately 5 mm causing a valgus   deformity.  End-stage degenerative joint disease with severe osteoarthritis   and bone-on-bone deformity includes severe sclerosis of bony cystic formation   and large osteophyte formation as well.    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    DIAGNOSIS:  Left knee pain, left knee osteoarthritis, severe; left knee   end-stage degenerative joint disease.    PLAN AND TREATMENT:  The patient has failed multiple steroid injections, which   did give him 100% of pain relief, but only for a few days.  Right now because   she has failed conservative treatment we will recommended a left total knee   arthroplasty.  All risks of the procedure were discussed including scarring,   bleeding, infection, reoperation, failure of  operation, nerve damage.  Dr. Friend could perform a perfect total knee arthroplasty and could still not get   alleviation as expected by the patient.  It is very rare.  This operation was   approximately 90% successful worldwide.  All questions and concerns were   discussed preoperatively, intraoperatively about the course of treatment.  All   other questions were discussed with the patient as well.  All vital signs are   in the Epic and are consistent.  All labs, EKG and chest x-rays are   consistent.  No abnormalities concerning.  We will proceed with procedure.    She already has a postop appointment in approximately 2 weeks after the   operation in Dr. Marco Antonio Friend's clinic.       ____________________________________     ANALLIIA Alvarado / WAYNE    DD:  06/06/2019 20:08:06  DT:  06/06/2019 23:44:07    D#:  [id number]  Job#:  [id number]

## 2019-06-13 ENCOUNTER — APPOINTMENT (OUTPATIENT)
Dept: RADIOLOGY | Facility: MEDICAL CENTER | Age: 64
End: 2019-06-13
Attending: HOSPITALIST
Payer: MEDICAID

## 2019-06-13 ENCOUNTER — APPOINTMENT (OUTPATIENT)
Dept: RADIOLOGY | Facility: MEDICAL CENTER | Age: 64
End: 2019-06-13
Attending: EMERGENCY MEDICINE
Payer: MEDICAID

## 2019-06-13 ENCOUNTER — HOSPITAL ENCOUNTER (OUTPATIENT)
Facility: MEDICAL CENTER | Age: 64
End: 2019-06-14
Attending: EMERGENCY MEDICINE | Admitting: HOSPITALIST
Payer: MEDICAID

## 2019-06-13 DIAGNOSIS — N28.9 RENAL INSUFFICIENCY: ICD-10-CM

## 2019-06-13 DIAGNOSIS — R11.2 NAUSEA AND VOMITING, INTRACTABILITY OF VOMITING NOT SPECIFIED, UNSPECIFIED VOMITING TYPE: ICD-10-CM

## 2019-06-13 DIAGNOSIS — R41.82 ALTERED MENTAL STATUS, UNSPECIFIED ALTERED MENTAL STATUS TYPE: ICD-10-CM

## 2019-06-13 DIAGNOSIS — E86.0 DEHYDRATION: ICD-10-CM

## 2019-06-13 PROBLEM — N17.9 AKI (ACUTE KIDNEY INJURY) (HCC): Status: ACTIVE | Noted: 2019-06-13

## 2019-06-13 PROBLEM — K21.00 GASTROESOPHAGEAL REFLUX DISEASE WITH ESOPHAGITIS: Status: ACTIVE | Noted: 2019-06-13

## 2019-06-13 PROBLEM — D64.9 ANEMIA: Status: ACTIVE | Noted: 2019-06-13

## 2019-06-13 PROBLEM — Z96.652 HISTORY OF LEFT KNEE REPLACEMENT: Status: ACTIVE | Noted: 2019-06-13

## 2019-06-13 PROBLEM — E87.6 HYPOKALEMIA: Status: ACTIVE | Noted: 2019-06-13

## 2019-06-13 LAB
ALBUMIN SERPL BCP-MCNC: 4.1 G/DL (ref 3.2–4.9)
ALBUMIN/GLOB SERPL: 1.5 G/DL
ALP SERPL-CCNC: 72 U/L (ref 30–99)
ALT SERPL-CCNC: 13 U/L (ref 2–50)
ANION GAP SERPL CALC-SCNC: 16 MMOL/L (ref 0–11.9)
APPEARANCE UR: CLEAR
AST SERPL-CCNC: 21 U/L (ref 12–45)
BACTERIA #/AREA URNS HPF: NEGATIVE /HPF
BASOPHILS # BLD AUTO: 0.8 % (ref 0–1.8)
BASOPHILS # BLD: 0.06 K/UL (ref 0–0.12)
BILIRUB SERPL-MCNC: 0.9 MG/DL (ref 0.1–1.5)
BILIRUB UR QL STRIP.AUTO: ABNORMAL
BUN SERPL-MCNC: 28 MG/DL (ref 8–22)
CALCIUM SERPL-MCNC: 9.4 MG/DL (ref 8.4–10.2)
CHLORIDE SERPL-SCNC: 105 MMOL/L (ref 96–112)
CHLORIDE UR-SCNC: 43 MMOL/L
CO2 SERPL-SCNC: 19 MMOL/L (ref 20–33)
COLOR UR: YELLOW
CREAT SERPL-MCNC: 1.16 MG/DL (ref 0.5–1.4)
CREAT UR-MCNC: 113 MG/DL
CRP SERPL HS-MCNC: 2.76 MG/DL (ref 0–0.75)
EKG IMPRESSION: NORMAL
EOSINOPHIL # BLD AUTO: 0.1 K/UL (ref 0–0.51)
EOSINOPHIL NFR BLD: 1.3 % (ref 0–6.9)
EPI CELLS #/AREA URNS HPF: ABNORMAL /HPF
ERYTHROCYTE [DISTWIDTH] IN BLOOD BY AUTOMATED COUNT: 42.7 FL (ref 35.9–50)
ERYTHROCYTE [SEDIMENTATION RATE] IN BLOOD BY WESTERGREN METHOD: 43 MM/HOUR (ref 0–30)
GLOBULIN SER CALC-MCNC: 2.8 G/DL (ref 1.9–3.5)
GLUCOSE SERPL-MCNC: 85 MG/DL (ref 65–99)
GLUCOSE UR STRIP.AUTO-MCNC: NEGATIVE MG/DL
HCT VFR BLD AUTO: 36.2 % (ref 37–47)
HEMOCCULT STL QL: NEGATIVE
HGB BLD-MCNC: 11.6 G/DL (ref 12–16)
HYALINE CASTS #/AREA URNS LPF: >10 /LPF
IMM GRANULOCYTES # BLD AUTO: 0.03 K/UL (ref 0–0.11)
IMM GRANULOCYTES NFR BLD AUTO: 0.4 % (ref 0–0.9)
IRON SATN MFR SERPL: 15 % (ref 15–55)
IRON SERPL-MCNC: 46 UG/DL (ref 40–170)
KETONES UR STRIP.AUTO-MCNC: 15 MG/DL
LEUKOCYTE ESTERASE UR QL STRIP.AUTO: NEGATIVE
LIPASE SERPL-CCNC: 52 U/L (ref 7–58)
LYMPHOCYTES # BLD AUTO: 1.68 K/UL (ref 1–4.8)
LYMPHOCYTES NFR BLD: 21.4 % (ref 22–41)
MCH RBC QN AUTO: 29.4 PG (ref 27–33)
MCHC RBC AUTO-ENTMCNC: 32 G/DL (ref 33.6–35)
MCV RBC AUTO: 91.6 FL (ref 81.4–97.8)
MICRO URNS: ABNORMAL
MONOCYTES # BLD AUTO: 0.6 K/UL (ref 0–0.85)
MONOCYTES NFR BLD AUTO: 7.6 % (ref 0–13.4)
NEUTROPHILS # BLD AUTO: 5.38 K/UL (ref 2–7.15)
NEUTROPHILS NFR BLD: 68.5 % (ref 44–72)
NITRITE UR QL STRIP.AUTO: NEGATIVE
NRBC # BLD AUTO: 0 K/UL
NRBC BLD-RTO: 0 /100 WBC
PH UR STRIP.AUTO: 5.5 [PH]
PLATELET # BLD AUTO: 589 K/UL (ref 164–446)
PMV BLD AUTO: 8.9 FL (ref 9–12.9)
POTASSIUM SERPL-SCNC: 3.4 MMOL/L (ref 3.6–5.5)
POTASSIUM UR-SCNC: 34.4 MMOL/L
PROT SERPL-MCNC: 6.9 G/DL (ref 6–8.2)
PROT UR QL STRIP: NEGATIVE MG/DL
PROT UR-MCNC: 12.3 MG/DL (ref 0–15)
RBC # BLD AUTO: 3.95 M/UL (ref 4.2–5.4)
RBC # URNS HPF: ABNORMAL /HPF
RBC UR QL AUTO: ABNORMAL
SODIUM SERPL-SCNC: 140 MMOL/L (ref 135–145)
SODIUM UR-SCNC: 51 MMOL/L
SP GR UR STRIP.AUTO: 1.02
TIBC SERPL-MCNC: 309 UG/DL (ref 250–450)
TROPONIN I SERPL-MCNC: <0.02 NG/ML (ref 0–0.04)
WBC # BLD AUTO: 7.9 K/UL (ref 4.8–10.8)
WBC #/AREA URNS HPF: ABNORMAL /HPF

## 2019-06-13 PROCEDURE — A9270 NON-COVERED ITEM OR SERVICE: HCPCS | Performed by: EMERGENCY MEDICINE

## 2019-06-13 PROCEDURE — 84133 ASSAY OF URINE POTASSIUM: CPT

## 2019-06-13 PROCEDURE — 86140 C-REACTIVE PROTEIN: CPT

## 2019-06-13 PROCEDURE — 73560 X-RAY EXAM OF KNEE 1 OR 2: CPT | Mod: LT

## 2019-06-13 PROCEDURE — A9270 NON-COVERED ITEM OR SERVICE: HCPCS | Performed by: INTERNAL MEDICINE

## 2019-06-13 PROCEDURE — 700102 HCHG RX REV CODE 250 W/ 637 OVERRIDE(OP): Performed by: HOSPITALIST

## 2019-06-13 PROCEDURE — 85652 RBC SED RATE AUTOMATED: CPT

## 2019-06-13 PROCEDURE — 82272 OCCULT BLD FECES 1-3 TESTS: CPT

## 2019-06-13 PROCEDURE — 80053 COMPREHEN METABOLIC PANEL: CPT

## 2019-06-13 PROCEDURE — A9270 NON-COVERED ITEM OR SERVICE: HCPCS | Performed by: HOSPITALIST

## 2019-06-13 PROCEDURE — 700105 HCHG RX REV CODE 258: Performed by: HOSPITALIST

## 2019-06-13 PROCEDURE — 84484 ASSAY OF TROPONIN QUANT: CPT

## 2019-06-13 PROCEDURE — 700111 HCHG RX REV CODE 636 W/ 250 OVERRIDE (IP): Performed by: HOSPITALIST

## 2019-06-13 PROCEDURE — 700102 HCHG RX REV CODE 250 W/ 637 OVERRIDE(OP): Performed by: EMERGENCY MEDICINE

## 2019-06-13 PROCEDURE — 36415 COLL VENOUS BLD VENIPUNCTURE: CPT

## 2019-06-13 PROCEDURE — 83550 IRON BINDING TEST: CPT

## 2019-06-13 PROCEDURE — 71045 X-RAY EXAM CHEST 1 VIEW: CPT

## 2019-06-13 PROCEDURE — 99220 PR INITIAL OBSERVATION CARE,LEVL III: CPT | Performed by: HOSPITALIST

## 2019-06-13 PROCEDURE — 81001 URINALYSIS AUTO W/SCOPE: CPT

## 2019-06-13 PROCEDURE — 82728 ASSAY OF FERRITIN: CPT

## 2019-06-13 PROCEDURE — 85025 COMPLETE CBC W/AUTO DIFF WBC: CPT

## 2019-06-13 PROCEDURE — 700102 HCHG RX REV CODE 250 W/ 637 OVERRIDE(OP): Performed by: INTERNAL MEDICINE

## 2019-06-13 PROCEDURE — 74018 RADEX ABDOMEN 1 VIEW: CPT

## 2019-06-13 PROCEDURE — 83690 ASSAY OF LIPASE: CPT

## 2019-06-13 PROCEDURE — 83540 ASSAY OF IRON: CPT

## 2019-06-13 PROCEDURE — 84156 ASSAY OF PROTEIN URINE: CPT

## 2019-06-13 PROCEDURE — 99285 EMERGENCY DEPT VISIT HI MDM: CPT

## 2019-06-13 PROCEDURE — G0378 HOSPITAL OBSERVATION PER HR: HCPCS

## 2019-06-13 PROCEDURE — 89190 NASAL SMEAR FOR EOSINOPHILS: CPT

## 2019-06-13 PROCEDURE — 96374 THER/PROPH/DIAG INJ IV PUSH: CPT

## 2019-06-13 PROCEDURE — 700105 HCHG RX REV CODE 258: Performed by: EMERGENCY MEDICINE

## 2019-06-13 PROCEDURE — 96372 THER/PROPH/DIAG INJ SC/IM: CPT

## 2019-06-13 PROCEDURE — 82436 ASSAY OF URINE CHLORIDE: CPT

## 2019-06-13 PROCEDURE — 70450 CT HEAD/BRAIN W/O DYE: CPT

## 2019-06-13 PROCEDURE — 87086 URINE CULTURE/COLONY COUNT: CPT

## 2019-06-13 PROCEDURE — 700111 HCHG RX REV CODE 636 W/ 250 OVERRIDE (IP): Performed by: EMERGENCY MEDICINE

## 2019-06-13 PROCEDURE — 84300 ASSAY OF URINE SODIUM: CPT

## 2019-06-13 PROCEDURE — 82570 ASSAY OF URINE CREATININE: CPT

## 2019-06-13 PROCEDURE — 93005 ELECTROCARDIOGRAM TRACING: CPT | Performed by: EMERGENCY MEDICINE

## 2019-06-13 RX ORDER — IBUPROFEN 800 MG/1
800 TABLET ORAL 3 TIMES DAILY PRN
Status: ON HOLD | COMMUNITY
End: 2019-06-14

## 2019-06-13 RX ORDER — ZOLPIDEM TARTRATE 5 MG/1
2.5-5 TABLET ORAL NIGHTLY PRN
Status: DISCONTINUED | OUTPATIENT
Start: 2019-06-13 | End: 2019-06-14 | Stop reason: HOSPADM

## 2019-06-13 RX ORDER — SODIUM CHLORIDE, SODIUM LACTATE, POTASSIUM CHLORIDE, CALCIUM CHLORIDE 600; 310; 30; 20 MG/100ML; MG/100ML; MG/100ML; MG/100ML
INJECTION, SOLUTION INTRAVENOUS CONTINUOUS
Status: DISCONTINUED | OUTPATIENT
Start: 2019-06-13 | End: 2019-06-14 | Stop reason: HOSPADM

## 2019-06-13 RX ORDER — OMEPRAZOLE 20 MG/1
20 CAPSULE, DELAYED RELEASE ORAL 2 TIMES DAILY
Status: DISCONTINUED | OUTPATIENT
Start: 2019-06-13 | End: 2019-06-14 | Stop reason: HOSPADM

## 2019-06-13 RX ORDER — POTASSIUM CHLORIDE 20 MEQ/1
40 TABLET, EXTENDED RELEASE ORAL ONCE
Status: COMPLETED | OUTPATIENT
Start: 2019-06-13 | End: 2019-06-13

## 2019-06-13 RX ORDER — ATORVASTATIN CALCIUM 10 MG/1
20 TABLET, FILM COATED ORAL NIGHTLY
Status: DISCONTINUED | OUTPATIENT
Start: 2019-06-13 | End: 2019-06-14 | Stop reason: HOSPADM

## 2019-06-13 RX ORDER — CYCLOBENZAPRINE HCL 10 MG
10 TABLET ORAL 3 TIMES DAILY PRN
Status: DISCONTINUED | OUTPATIENT
Start: 2019-06-13 | End: 2019-06-14 | Stop reason: HOSPADM

## 2019-06-13 RX ORDER — AMOXICILLIN 250 MG
2 CAPSULE ORAL 2 TIMES DAILY
Status: DISCONTINUED | OUTPATIENT
Start: 2019-06-13 | End: 2019-06-14 | Stop reason: HOSPADM

## 2019-06-13 RX ORDER — ONDANSETRON 2 MG/ML
4 INJECTION INTRAMUSCULAR; INTRAVENOUS ONCE
Status: COMPLETED | OUTPATIENT
Start: 2019-06-13 | End: 2019-06-13

## 2019-06-13 RX ORDER — CALCIUM CARBONATE 500 MG/1
500 TABLET, CHEWABLE ORAL DAILY
Status: DISCONTINUED | OUTPATIENT
Start: 2019-06-14 | End: 2019-06-14 | Stop reason: HOSPADM

## 2019-06-13 RX ORDER — BISACODYL 10 MG
10 SUPPOSITORY, RECTAL RECTAL
Status: DISCONTINUED | OUTPATIENT
Start: 2019-06-13 | End: 2019-06-14 | Stop reason: HOSPADM

## 2019-06-13 RX ORDER — POLYETHYLENE GLYCOL 3350 17 G/17G
1 POWDER, FOR SOLUTION ORAL
Status: DISCONTINUED | OUTPATIENT
Start: 2019-06-13 | End: 2019-06-14 | Stop reason: HOSPADM

## 2019-06-13 RX ORDER — ACETAMINOPHEN 325 MG/1
650 TABLET ORAL EVERY 6 HOURS PRN
Status: DISCONTINUED | OUTPATIENT
Start: 2019-06-13 | End: 2019-06-14 | Stop reason: HOSPADM

## 2019-06-13 RX ORDER — LISINOPRIL 5 MG/1
10 TABLET ORAL EVERY EVENING
Status: DISCONTINUED | OUTPATIENT
Start: 2019-06-13 | End: 2019-06-13

## 2019-06-13 RX ORDER — SODIUM CHLORIDE 9 MG/ML
1000 INJECTION, SOLUTION INTRAVENOUS CONTINUOUS
Status: DISPENSED | OUTPATIENT
Start: 2019-06-13 | End: 2019-06-13

## 2019-06-13 RX ORDER — HYDRALAZINE HYDROCHLORIDE 20 MG/ML
20 INJECTION INTRAMUSCULAR; INTRAVENOUS EVERY 6 HOURS PRN
Status: DISCONTINUED | OUTPATIENT
Start: 2019-06-13 | End: 2019-06-14 | Stop reason: HOSPADM

## 2019-06-13 RX ORDER — OXYCODONE HYDROCHLORIDE 5 MG/1
5 TABLET ORAL EVERY 4 HOURS PRN
Status: DISCONTINUED | OUTPATIENT
Start: 2019-06-13 | End: 2019-06-14 | Stop reason: HOSPADM

## 2019-06-13 RX ORDER — AMLODIPINE BESYLATE 5 MG/1
5 TABLET ORAL
Status: DISCONTINUED | OUTPATIENT
Start: 2019-06-13 | End: 2019-06-14 | Stop reason: HOSPADM

## 2019-06-13 RX ADMIN — SODIUM CHLORIDE 1000 ML: 9 INJECTION, SOLUTION INTRAVENOUS at 12:47

## 2019-06-13 RX ADMIN — SENNOSIDES,DOCUSATE SODIUM 2 TABLET: 8.6; 5 TABLET, FILM COATED ORAL at 17:42

## 2019-06-13 RX ADMIN — SERTRALINE HYDROCHLORIDE 50 MG: 50 TABLET ORAL at 17:42

## 2019-06-13 RX ADMIN — ZOLPIDEM TARTRATE 5 MG: 5 TABLET ORAL at 20:14

## 2019-06-13 RX ADMIN — ANTACID TABLETS 500 MG: 500 TABLET, CHEWABLE ORAL at 21:22

## 2019-06-13 RX ADMIN — LIDOCAINE HYDROCHLORIDE 30 ML: 20 SOLUTION OROPHARYNGEAL at 13:46

## 2019-06-13 RX ADMIN — CYCLOBENZAPRINE HYDROCHLORIDE 10 MG: 10 TABLET, FILM COATED ORAL at 23:45

## 2019-06-13 RX ADMIN — AMLODIPINE BESYLATE 5 MG: 5 TABLET ORAL at 17:42

## 2019-06-13 RX ADMIN — SODIUM CHLORIDE, POTASSIUM CHLORIDE, SODIUM LACTATE AND CALCIUM CHLORIDE: 600; 310; 30; 20 INJECTION, SOLUTION INTRAVENOUS at 17:43

## 2019-06-13 RX ADMIN — POTASSIUM CHLORIDE 40 MEQ: 1500 TABLET, EXTENDED RELEASE ORAL at 17:05

## 2019-06-13 RX ADMIN — OMEPRAZOLE 20 MG: 20 CAPSULE, DELAYED RELEASE ORAL at 17:42

## 2019-06-13 RX ADMIN — ACETAMINOPHEN 650 MG: 325 TABLET, FILM COATED ORAL at 21:21

## 2019-06-13 RX ADMIN — ATORVASTATIN CALCIUM 20 MG: 10 TABLET, FILM COATED ORAL at 20:14

## 2019-06-13 RX ADMIN — ENOXAPARIN SODIUM 40 MG: 100 INJECTION SUBCUTANEOUS at 17:43

## 2019-06-13 RX ADMIN — ONDANSETRON 4 MG: 2 INJECTION INTRAMUSCULAR; INTRAVENOUS at 12:46

## 2019-06-13 ASSESSMENT — ENCOUNTER SYMPTOMS
DIAPHORESIS: 0
POLYDIPSIA: 0
MYALGIAS: 0
EYE PAIN: 0
PHOTOPHOBIA: 0
STRIDOR: 0
BLOOD IN STOOL: 0
NECK PAIN: 0
HEARTBURN: 1
DEPRESSION: 0
FLANK PAIN: 0
HEMOPTYSIS: 0
SORE THROAT: 0
DIARRHEA: 0
COUGH: 0
PND: 0
NAUSEA: 1
PALPITATIONS: 0
FEVER: 0
DIZZINESS: 0
HALLUCINATIONS: 0
SINUS PAIN: 0
CONSTIPATION: 0
WHEEZING: 0
BLURRED VISION: 0
DOUBLE VISION: 0
WEAKNESS: 0
ORTHOPNEA: 0
TINGLING: 0
BRUISES/BLEEDS EASILY: 0
BACK PAIN: 0
SPUTUM PRODUCTION: 0
HEADACHES: 0
SHORTNESS OF BREATH: 0
FALLS: 0
TREMORS: 0
CLAUDICATION: 0
CHILLS: 0
ABDOMINAL PAIN: 0
VOMITING: 1

## 2019-06-13 ASSESSMENT — COGNITIVE AND FUNCTIONAL STATUS - GENERAL
DRESSING REGULAR LOWER BODY CLOTHING: A LITTLE
EATING MEALS: A LITTLE
MOVING TO AND FROM BED TO CHAIR: A LITTLE
PERSONAL GROOMING: A LITTLE
SUGGESTED CMS G CODE MODIFIER DAILY ACTIVITY: CK
HELP NEEDED FOR BATHING: A LITTLE
SUGGESTED CMS G CODE MODIFIER MOBILITY: CK
MOVING FROM LYING ON BACK TO SITTING ON SIDE OF FLAT BED: A LITTLE
STANDING UP FROM CHAIR USING ARMS: A LITTLE
TURNING FROM BACK TO SIDE WHILE IN FLAT BAD: A LITTLE
WALKING IN HOSPITAL ROOM: A LITTLE
MOBILITY SCORE: 18
CLIMB 3 TO 5 STEPS WITH RAILING: A LITTLE
TOILETING: A LITTLE
DAILY ACTIVITIY SCORE: 18
DRESSING REGULAR UPPER BODY CLOTHING: A LITTLE

## 2019-06-13 ASSESSMENT — PATIENT HEALTH QUESTIONNAIRE - PHQ9
SUM OF ALL RESPONSES TO PHQ9 QUESTIONS 1 AND 2: 0
2. FEELING DOWN, DEPRESSED, IRRITABLE, OR HOPELESS: NOT AT ALL
1. LITTLE INTEREST OR PLEASURE IN DOING THINGS: NOT AT ALL

## 2019-06-13 ASSESSMENT — LIFESTYLE VARIABLES
SUBSTANCE_ABUSE: 0
EVER_SMOKED: NEVER

## 2019-06-13 NOTE — H&P
Hospital Medicine History & Physical Note    Date of Service  6/13/2019    Primary Care Physician  Santa Otto M.D.    Consultants  None    Code Status  full    Chief Complaint  Weakness and fatigue    History of Presenting Illness  63 y.o. female who presented 6/13/2019 with history of C. difficile, history of hypertension comes into the emergency room having episodes of nausea and vomiting.  Patient states that she recently had a knee replacement surgery on the 31st.  Afterwards, she went home and felt weak and fatigued.  This again appears to be worse and then she started developing acid reflux and vomiting.  She states that she does have an acid taste in the morning when she wakes up.  She was transitioning herself from oxycodone to ibuprofen for pain control.  She was using 800 mg of ibuprofen 3 times a day.  She denies any reports of GI bleeding including melena or hematochezia.  Patient denies any diarrhea, abdominal pain, fevers, chills.  Patient denied any severe tenderness or pain of her left knee.  She states that the swelling is decreasing. She quit smoking 6 years ago.  Patient arrived to emergency with normal vital signs.  She was noticed to be in TELMA with elevated BUN.  Her anion gap was 16.  She was also hyperkalemic.  UA found no evidence of UTI.  CT scan of the head was negative.    Review of Systems  Review of Systems   Constitutional: Negative for chills, diaphoresis, fever and malaise/fatigue.   HENT: Negative for congestion, ear discharge, ear pain, hearing loss, nosebleeds, sinus pain, sore throat and tinnitus.    Eyes: Negative for blurred vision, double vision, photophobia and pain.   Respiratory: Negative for cough, hemoptysis, sputum production, shortness of breath, wheezing and stridor.    Cardiovascular: Negative for chest pain, palpitations, orthopnea, claudication, leg swelling and PND.   Gastrointestinal: Positive for heartburn, nausea and vomiting. Negative for abdominal pain,  blood in stool, constipation, diarrhea and melena.   Genitourinary: Negative for dysuria, flank pain, frequency, hematuria and urgency.   Musculoskeletal: Negative for back pain, falls, joint pain, myalgias and neck pain.   Skin: Negative for itching and rash.   Neurological: Negative for dizziness, tingling, tremors, weakness and headaches.   Endo/Heme/Allergies: Negative for environmental allergies and polydipsia. Does not bruise/bleed easily.   Psychiatric/Behavioral: Negative for depression, hallucinations, substance abuse and suicidal ideas.       Past Medical History   has a past medical history of Adverse effect of anesthesia; Anesthesia; Arthritis; Bronchitis (2013); C. difficile colitis; C. difficile diarrhea (5/2013); Cancer (HCC) (2008); Cataract; Diverticulitis; High cholesterol; Hypertension; Insomnia related to another mental disorder; Pain; Pneumonia (2011); PONV (postoperative nausea and vomiting); Psychiatric disturbance; and ULCERATIVE COLITIS.    Surgical History   has a past surgical history that includes other orthopedic surgery; tonsillectomy; wrist orif (9/5/2013); fecal transplant (11/05/2013); knee arthroplasty total (9/26/2014); hemorrhoidectomy; and pr total knee arthroplasty (Left, 5/31/2019).     Family History  family history includes Cancer in her maternal grandmother; Cancer (age of onset: 48) in her mother; Heart Disease in her father; Hypertension in her father.     Social History   reports that she quit smoking about 6 years ago. Her smoking use included Cigarettes. She has a 10.00 pack-year smoking history. She has never used smokeless tobacco. She reports that she drinks alcohol. She reports that she does not use drugs.    Allergies  Allergies   Allergen Reactions   • Latex Anaphylaxis, Shortness of Breath and Swelling       Medications  Prior to Admission Medications   Prescriptions Last Dose Informant Patient Reported? Taking?   aspirin  MG Tablet Delayed Response  6/12/2019 at pm Patient No No   Sig: Take 1 Tab by mouth 2 Times a Day for 30 days.   atorvastatin (LIPITOR) 20 MG Tab 6/12/2019 at pm Patient Yes No   Sig: Take 20 mg by mouth every evening.   ibuprofen (MOTRIN) 800 MG Tab 6/12/2019 at pm Patient Yes Yes   Sig: Take 800 mg by mouth 3 times a day as needed for Moderate Pain.   lisinopril (PRINIVIL) 10 MG Tab 6/12/2019 at pm Patient Yes No   Sig: Take 10 mg by mouth every evening.   oxyCODONE immediate-release (ROXICODONE) 10 MG immediate release tablet 6/10/2019 at k Patient No No   Sig: Take 1 Tab by mouth every 3 hours as needed for up to 14 days.   sertraline (ZOLOFT) 50 MG Tab 6/12/2019 at pm Patient Yes No   Sig: Take 50 mg by mouth every evening.   zolpidem (AMBIEN) 5 MG Tab 6/12/2019 at  Patient Yes No   Sig: Take 2.5-5 mg by mouth at bedtime as needed for Sleep.      Facility-Administered Medications: None       Physical Exam  Temp:  [36.7 °C (98.1 °F)-37.1 °C (98.7 °F)] 36.7 °C (98.1 °F)  Pulse:  [64-75] 75  Resp:  [10-20] 20  BP: (117-147)/(48-93) 117/93  SpO2:  [93 %-100 %] 100 %    Physical Exam   Constitutional: She is oriented to person, place, and time. She appears well-developed and well-nourished.   Pale   Malnourished    HENT:   Head: Normocephalic and atraumatic.   Mouth/Throat: No oropharyngeal exudate.   Eyes: Pupils are equal, round, and reactive to light. EOM are normal. No scleral icterus.   Neck: Normal range of motion. Neck supple. No JVD present. No tracheal deviation present. No thyromegaly present.   Cardiovascular: Normal rate, regular rhythm and intact distal pulses.  Exam reveals no gallop and no friction rub.    No murmur heard.  Pulmonary/Chest: Effort normal and breath sounds normal. No stridor. No respiratory distress. She has no wheezes. She has no rales. She exhibits no tenderness.   Abdominal: Soft. Bowel sounds are normal. She exhibits no distension and no mass. There is no tenderness. There is no rebound and no guarding.    Musculoskeletal: Normal range of motion. She exhibits edema.   Left knee swelling, no drainage noted   Lymphadenopathy:     She has no cervical adenopathy.   Neurological: She is alert and oriented to person, place, and time. She has normal reflexes. No cranial nerve deficit.   Skin: No rash noted. No erythema. No pallor.   Nursing note and vitals reviewed.      Laboratory:  Recent Labs      06/13/19   1325   WBC  7.9   RBC  3.95*   HEMOGLOBIN  11.6*   HEMATOCRIT  36.2*   MCV  91.6   MCH  29.4   MCHC  32.0*   RDW  42.7   PLATELETCT  589*   MPV  8.9*     Recent Labs      06/13/19   1325   SODIUM  140   POTASSIUM  3.4*   CHLORIDE  105   CO2  19*   GLUCOSE  85   BUN  28*   CREATININE  1.16   CALCIUM  9.4     Recent Labs      06/13/19   1325   ALTSGPT  13   ASTSGOT  21   ALKPHOSPHAT  72   TBILIRUBIN  0.9   LIPASE  52   GLUCOSE  85                 Recent Labs      06/13/19   1325   TROPONINI  <0.02       Urinalysis:    Recent Labs      06/13/19   1315   SPECGRAVITY  1.020   GLUCOSEUR  Negative   KETONES  15*   NITRITE  Negative   LEUKESTERAS  Negative   WBCURINE  10-20*   RBCURINE  0-2   BACTERIA  Negative   EPITHELCELL  Moderate*        Imaging:  DX-KNEE 2- LEFT   Final Result      No radiographic evidence of periprosthetic fracture or loosening      Improved prepatellar soft tissue swelling is presumably postoperative in nature. Recommend clinical correlation. No soft tissue gas is seen to suggest abscess      [id number] VIEW   Final Result      Normal radiographs of the abdomen and pelvis      CT-HEAD W/O   Final Result      1.  No CT evidence of acute infarct, hemorrhage or mass.   2.  Mild global parenchymal atrophy. Chronic small vessel ischemic changes.      DX-CHEST-PORTABLE (1 VIEW)   Final Result      No radiographic evidence of acute cardiopulmonary process.            Assessment/Plan:  I anticipate this patient is appropriate for observation status at this time.    * TELMA (acute kidney injury) (HCC)    Assessment & Plan    Likely prerenal vs ibuprofen use  IV fluid hydration with LR  Monitor BMP and assess response  Avoid IV contrast/nephrotoxins/NSAIDs-holding home aspirin and ibuprofen  Dose adjust meds for decreased GFR  Follow-up on urine electrolytes  Follow-up on urine eosinophil       History of left knee replacement   Assessment & Plan    S/P surgery on 31st  I ordered knee x ray  No visible drainage   ESR crp     Hypokalemia   Assessment & Plan    replaced     Gastroesophageal reflux disease with esophagitis   Assessment & Plan    History of peptic ulcers  Start omeprazole twice daily  Keep head above 30 degrees  Small frequent meals  Follow-up on stool guaiac     Anemia   Assessment & Plan    NSAID abuse  Acid reflux  I ordered iron panel and stool guaiac       Hypercholesteremia- (present on admission)   Assessment & Plan    Cont statin     Essential hypertension- (present on admission)   Assessment & Plan    Holding Lisinopril in setting of TELMA  Started on norvasc  hydralazine PRN     Depression with anxiety- (present on admission)   Assessment & Plan    Cont home meds         VTE prophylaxis: Lovenox

## 2019-06-13 NOTE — ED NOTES
"Pt presents with a recent history of total left knee arthroplasty completed the 31 first of last month.  She complains of transitory periods of N/V recurring for the past 3 days.  Chief Complaint   Patient presents with   • N/V     /48   Pulse 72   Temp 37.1 °C (98.7 °F) (Temporal)   Resp 18   Ht 1.626 m (5' 4\")   Wt 62 kg (136 lb 11 oz)   SpO2 100%   BMI 23.46 kg/m²     "

## 2019-06-13 NOTE — ASSESSMENT & PLAN NOTE
History of peptic ulcers  Start omeprazole twice daily  Keep head above 30 degrees  Small frequent meals  Follow-up on stool guaiac

## 2019-06-13 NOTE — ED NOTES
Medication Reconciliation updated and complete per pt at bedside  Allergies have been verified and updated  No oral ABX within the last 14 days  Pt Home Pharmacy:[facility]

## 2019-06-13 NOTE — ASSESSMENT & PLAN NOTE
Likely prerenal vs ibuprofen use  IV fluid hydration with LR  Monitor BMP and assess response  Avoid IV contrast/nephrotoxins/NSAIDs-holding home aspirin and ibuprofen  Dose adjust meds for decreased GFR  Follow-up on urine electrolytes  Follow-up on urine eosinophil

## 2019-06-13 NOTE — ED PROVIDER NOTES
ED Provider Note    CHIEF COMPLAINT  Chief Complaint   Patient presents with   • N/V       HPI  Kendra Tracey Winters is a 63 y.o. female who presents complaining of confusion dehydration nausea and vomiting the last several days.  The patient is almost 2 weeks postop from her fourth knee replacement of her left knee.  She states that she has been walking around and doing home rehab and is supposed to start real rehab next week.  She states that she is converting herself over from the oxycodone pills to anti-inflammatories.  Patient comes in today because she has had increased confusion weakness and nausea and vomiting for the last couple of days.  She states that this morning when she woke up she did not know where she was for a few minutes and became very anxious and afraid.  She is since regained her faculties and does not feel confused at this moment.  She states that when she had that happen she denied any unilateral weakness or numbness slurred speech or vision changes.  She denies any falls or trauma.  She does not take any blood thinning medications.  She denies any fevers or chills.  She says that she has had a lot of nausea and vomiting and a burning pain in her chest.  She has no history of heart attack stroke or diabetes.  She denies having any diarrhea or abdominal pain.    REVIEW OF SYSTEMS  HEENT:  No ear pain, congestion or sore throat   EYES: no discharge redness or vision changes  CARDIAC: no chest pain, palpitations    PULMONARY: no dyspnea, cough or congestion   GI: Positive nausea and vomiting no diarrhea or abdominal pain   : no dysuria, back pain or hematuria   Neuro: no weakness, numbness aphasia or headache positive for confusion this morning  Musculoskeletal: no swelling deformity or pain no joint swelling  Endocrine: no fevers, sweating, weight loss   SKIN: no rash, erythema or contusions     See history of present illness all other systems are negative       PAST MEDICAL  "HISTORY  Past Medical History:   Diagnosis Date   • C. difficile diarrhea 5/2013    resolved since 1/2014   • Bronchitis 2013   • Pneumonia 2011   • Cancer (HCC) 2008    skin cancer   • Adverse effect of anesthesia     \"dizzy\" post op   • Anesthesia     slow to wake up   • Arthritis     knees   • C. difficile colitis    • Cataract     no sx   • Diverticulitis    • High cholesterol    • Hypertension    • Insomnia related to another mental disorder     stress   • Pain     right knee   • PONV (postoperative nausea and vomiting)    • Psychiatric disturbance     stress   • ULCERATIVE COLITIS        FAMILY HISTORY  Family History   Problem Relation Age of Onset   • Cancer Mother 48        breast   • Cancer Maternal Grandmother    • Hypertension Father    • Heart Disease Father        SOCIAL HISTORY  Social History     Social History   • Marital status:      Spouse name: N/A   • Number of children: N/A   • Years of education: N/A     Social History Main Topics   • Smoking status: Former Smoker     Packs/day: 0.50     Years: 20.00     Types: Cigarettes     Quit date: 2/20/2013   • Smokeless tobacco: Never Used      Comment: 2 cig /week   • Alcohol use Yes      Comment: 2-3 per week   • Drug use: No   • Sexual activity: Not Currently     Other Topics Concern   • Not on file     Social History Narrative   • No narrative on file       SURGICAL HISTORY  Past Surgical History:   Procedure Laterality Date   • PB TOTAL KNEE ARTHROPLASTY Left 5/31/2019    Procedure: ARTHROPLASTY, KNEE, TOTAL;  Surgeon: Marco Antonio Friend M.D.;  Location: [facility];  Service: Orthopedics   • KNEE ARTHROPLASTY TOTAL  9/26/2014    Performed by Marco Antonio Friend M.D. at [facility]   • FECAL TRANSPLANT  11/05/2013    x3   • WRIST ORIF  9/5/2013    Performed by Teto Hernandez M.D. at [facility]   • HEMORRHOIDECTOMY     • OTHER ORTHOPEDIC SURGERY      knee    • TONSILLECTOMY         CURRENT MEDICATIONS  Home " "Medications     Reviewed by Boyd Yu (Pharmacy Tech) on 06/13/19 at 1405  Med List Status: Complete   Medication Last Dose Status   aspirin  MG Tablet Delayed Response 6/12/2019 Active   atorvastatin (LIPITOR) 20 MG Tab 6/12/2019 Active   ibuprofen (MOTRIN) 800 MG Tab 6/12/2019 Active   lisinopril (PRINIVIL) 10 MG Tab 6/12/2019 Active   oxyCODONE immediate-release (ROXICODONE) 10 MG immediate release tablet 6/10/2019 Active   sertraline (ZOLOFT) 50 MG Tab 6/12/2019 Active   zolpidem (AMBIEN) 5 MG Tab 6/12/2019 Active                 ALLERGIES  Allergies   Allergen Reactions   • Latex Anaphylaxis, Shortness of Breath and Swelling       PHYSICAL EXAM  VITAL SIGNS: /48   Pulse 68   Temp 37.1 °C (98.7 °F) (Temporal)   Resp (!) 10   Ht 1.626 m (5' 4\")   Wt 62 kg (136 lb 11 oz)   SpO2 94%   BMI 23.46 kg/m²       Constitutional: Well developed, Well nourished, No acute distress, Non-toxic appearance.  Anxious  HENT: Normocephalic, Atraumatic, Bilateral external ears normal, Oropharynx moist, No oral exudates, Nose normal.   Eyes: PERRLA, EOMI, Conjunctiva normal, No discharge.   Neck: Normal range of motion, No tenderness, Supple, No stridor.   Lymphatic: No lymphadenopathy noted.   Cardiovascular: Normal heart rate, Normal rhythm, No murmurs, No rubs, No gallops.   Thorax & Lungs: Normal breath sounds, No respiratory distress, No wheezing, No chest tenderness.   Abdomen: Soft no distention normal bowel sounds no rebound masses or peritoneal signs  Skin: Warm, Dry, No erythema, No rash.   Back: No tenderness, No CVA tenderness.   Extremities: Intact distal pulses, No edema, No tenderness, No cyanosis, No clubbing.   Neurologic: Alert & oriented x 3, Normal motor function, Normal sensory function, No focal deficits noted.  NIH is 0      RADIOLOGY/PROCEDURES  CT-HEAD W/O   Final Result      1.  No CT evidence of acute infarct, hemorrhage or mass.   2.  Mild global parenchymal atrophy. Chronic " small vessel ischemic changes.      DX-CHEST-PORTABLE (1 VIEW)   Final Result      No radiographic evidence of acute cardiopulmonary process.            COURSE & MEDICAL DECISION MAKING  Pertinent Labs & Imaging studies reviewed. (See chart for details)  Differential diagnosis: Dehydration, infection, intracranial abnormality, anxiety, medication reaction, cardiac ischemia    HYDRATION: Based on the patient's presentation of Acute Vomiting the patient was given IV fluids. IV Hydration was used because oral hydration was not adequate alone. Upon recheck following hydration, the patient was Improved.    12:33 PM An IV was started and labs were drawn.  I gave the patient some Zofran and a GI cocktail.    Results for orders placed or performed during the hospital encounter of 06/13/19   CBC WITH DIFFERENTIAL   Result Value Ref Range    WBC 7.9 4.8 - 10.8 K/uL    RBC 3.95 (L) 4.20 - 5.40 M/uL    Hemoglobin 11.6 (L) 12.0 - 16.0 g/dL    Hematocrit 36.2 (L) 37.0 - 47.0 %    MCV 91.6 81.4 - 97.8 fL    MCH 29.4 27.0 - 33.0 pg    MCHC 32.0 (L) 33.6 - 35.0 g/dL    RDW 42.7 35.9 - 50.0 fL    Platelet Count 589 (H) 164 - 446 K/uL    MPV 8.9 (L) 9.0 - 12.9 fL    Neutrophils-Polys 68.50 44.00 - 72.00 %    Lymphocytes 21.40 (L) 22.00 - 41.00 %    Monocytes 7.60 0.00 - 13.40 %    Eosinophils 1.30 0.00 - 6.90 %    Basophils 0.80 0.00 - 1.80 %    Immature Granulocytes 0.40 0.00 - 0.90 %    Nucleated RBC 0.00 /100 WBC    Neutrophils (Absolute) 5.38 2.00 - 7.15 K/uL    Lymphs (Absolute) 1.68 1.00 - 4.80 K/uL    Monos (Absolute) 0.60 0.00 - 0.85 K/uL    Eos (Absolute) 0.10 0.00 - 0.51 K/uL    Baso (Absolute) 0.06 0.00 - 0.12 K/uL    Immature Granulocytes (abs) 0.03 0.00 - 0.11 K/uL    NRBC (Absolute) 0.00 K/uL   COMP METABOLIC PANEL   Result Value Ref Range    Sodium 140 135 - 145 mmol/L    Potassium 3.4 (L) 3.6 - 5.5 mmol/L    Chloride 105 96 - 112 mmol/L    Co2 19 (L) 20 - 33 mmol/L    Anion Gap 16.0 (H) 0.0 - 11.9    Glucose 85 65 - 99  mg/dL    Bun 28 (H) 8 - 22 mg/dL    Creatinine 1.16 0.50 - 1.40 mg/dL    Calcium 9.4 8.4 - 10.2 mg/dL    AST(SGOT) 21 12 - 45 U/L    ALT(SGPT) 13 2 - 50 U/L    Alkaline Phosphatase 72 30 - 99 U/L    Total Bilirubin 0.9 0.1 - 1.5 mg/dL    Albumin 4.1 3.2 - 4.9 g/dL    Total Protein 6.9 6.0 - 8.2 g/dL    Globulin 2.8 1.9 - 3.5 g/dL    A-G Ratio 1.5 g/dL   LIPASE   Result Value Ref Range    Lipase 52 7 - 58 U/L   TROPONIN   Result Value Ref Range    Troponin I <0.02 0.00 - 0.04 ng/mL   URINALYSIS,CULTURE IF INDICATED   Result Value Ref Range    Color Yellow     Character Clear     Specific Gravity 1.020 <1.035    Ph 5.5 5.0 - 8.0    Glucose Negative Negative mg/dL    Ketones 15 (A) Negative mg/dL    Protein Negative Negative mg/dL    Bilirubin Small (A) Negative    Nitrite Negative Negative    Leukocyte Esterase Negative Negative    Occult Blood Trace (A) Negative    Micro Urine Req Microscopic    URINE MICROSCOPIC (W/UA)   Result Value Ref Range    WBC 10-20 (A) /hpf    RBC 0-2 /hpf    Bacteria Negative None /hpf    Epithelial Cells Moderate (A) Few /hpf    Hyaline Cast >10 (A) /lpf   ESTIMATED GFR   Result Value Ref Range    GFR If  57 (A) >60 mL/min/1.73 m 2    GFR If Non  47 (A) >60 mL/min/1.73 m 2   EKG (NOW)   Result Value Ref Range    Report       [facility] Emergency Dept.    Test Date:  2019  Pt Name:    TEODORO ORELLANA         Department: [facility]  MRN:        [id number]                      Room:       -ROOM 1  Gender:     Female                       Technician: [id number]  :        1955                   Requested By:JEREMIAS BURRELL  Order #:    [id number]                    Reading MD: JEREMIAS BURRELL MD    Measurements  Intervals                                Axis  Rate:       68                           P:          49  ID:         [id number]                          QRS:        17  QRSD:       117                          T:          29  QT:          440  QTc:        469    Interpretive Statements  Sinus rhythm  Nonspecific intraventricular conduction delay  Compared to ECG 05/23/2019 13:50:25  Intraventricular conduction delay now present    Electronically Signed On 6- 14:19:53 PDT by JEREMIAS BURRELL MD        2:20 PM The patient's work-up is essentially negative except for some renal insufficiency and dehydration.  I will admit her to the hospitalist for overnight observation.  She understands her need to stay.  I spoke with Dr. Padilla who accepts her for admission.    FINAL IMPRESSION  1. Nausea and vomiting, intractability of vomiting not specified, unspecified vomiting type    2. Renal insufficiency    3. Altered mental status, unspecified altered mental status type    4. Dehydration          Electronically signed by: Jeremias Burrell, 6/13/2019 12:14 PM

## 2019-06-14 VITALS
HEART RATE: 68 BPM | HEIGHT: 64 IN | DIASTOLIC BLOOD PRESSURE: 49 MMHG | RESPIRATION RATE: 18 BRPM | WEIGHT: 136.69 LBS | TEMPERATURE: 98.4 F | SYSTOLIC BLOOD PRESSURE: 104 MMHG | OXYGEN SATURATION: 95 % | BODY MASS INDEX: 23.34 KG/M2

## 2019-06-14 LAB
ALBUMIN SERPL BCP-MCNC: 3.1 G/DL (ref 3.2–4.9)
ALBUMIN/GLOB SERPL: 1.3 G/DL
ALP SERPL-CCNC: 57 U/L (ref 30–99)
ALT SERPL-CCNC: 10 U/L (ref 2–50)
ANION GAP SERPL CALC-SCNC: 8 MMOL/L (ref 0–11.9)
AST SERPL-CCNC: 15 U/L (ref 12–45)
BILIRUB SERPL-MCNC: 0.9 MG/DL (ref 0.1–1.5)
BUN SERPL-MCNC: 18 MG/DL (ref 8–22)
CALCIUM SERPL-MCNC: 8.9 MG/DL (ref 8.4–10.2)
CHLORIDE SERPL-SCNC: 111 MMOL/L (ref 96–112)
CO2 SERPL-SCNC: 22 MMOL/L (ref 20–33)
CREAT SERPL-MCNC: 0.83 MG/DL (ref 0.5–1.4)
EOSINOPHIL URNS QL MICRO: NORMAL
ERYTHROCYTE [DISTWIDTH] IN BLOOD BY AUTOMATED COUNT: 42.5 FL (ref 35.9–50)
FERRITIN SERPL-MCNC: 242.9 NG/ML (ref 10–291)
GLOBULIN SER CALC-MCNC: 2.3 G/DL (ref 1.9–3.5)
GLUCOSE SERPL-MCNC: 89 MG/DL (ref 65–99)
HCT VFR BLD AUTO: 28.4 % (ref 37–47)
HGB BLD-MCNC: 9.1 G/DL (ref 12–16)
MCH RBC QN AUTO: 28.9 PG (ref 27–33)
MCHC RBC AUTO-ENTMCNC: 32 G/DL (ref 33.6–35)
MCV RBC AUTO: 90.4 FL (ref 81.4–97.8)
PLATELET # BLD AUTO: 514 K/UL (ref 164–446)
PMV BLD AUTO: 9.1 FL (ref 9–12.9)
POTASSIUM SERPL-SCNC: 3.8 MMOL/L (ref 3.6–5.5)
PROT SERPL-MCNC: 5.4 G/DL (ref 6–8.2)
RBC # BLD AUTO: 3.11 M/UL (ref 4.2–5.4)
SODIUM SERPL-SCNC: 141 MMOL/L (ref 135–145)
WBC # BLD AUTO: 7.5 K/UL (ref 4.8–10.8)

## 2019-06-14 PROCEDURE — 700102 HCHG RX REV CODE 250 W/ 637 OVERRIDE(OP): Performed by: HOSPITALIST

## 2019-06-14 PROCEDURE — 36415 COLL VENOUS BLD VENIPUNCTURE: CPT

## 2019-06-14 PROCEDURE — 80053 COMPREHEN METABOLIC PANEL: CPT

## 2019-06-14 PROCEDURE — G0378 HOSPITAL OBSERVATION PER HR: HCPCS

## 2019-06-14 PROCEDURE — A9270 NON-COVERED ITEM OR SERVICE: HCPCS | Performed by: INTERNAL MEDICINE

## 2019-06-14 PROCEDURE — 96372 THER/PROPH/DIAG INJ SC/IM: CPT

## 2019-06-14 PROCEDURE — 85027 COMPLETE CBC AUTOMATED: CPT

## 2019-06-14 PROCEDURE — 700105 HCHG RX REV CODE 258: Performed by: HOSPITALIST

## 2019-06-14 PROCEDURE — 97165 OT EVAL LOW COMPLEX 30 MIN: CPT

## 2019-06-14 PROCEDURE — 97535 SELF CARE MNGMENT TRAINING: CPT

## 2019-06-14 PROCEDURE — 99217 PR OBSERVATION CARE DISCHARGE: CPT | Performed by: HOSPITALIST

## 2019-06-14 PROCEDURE — 97161 PT EVAL LOW COMPLEX 20 MIN: CPT

## 2019-06-14 PROCEDURE — 700102 HCHG RX REV CODE 250 W/ 637 OVERRIDE(OP): Performed by: INTERNAL MEDICINE

## 2019-06-14 PROCEDURE — 700111 HCHG RX REV CODE 636 W/ 250 OVERRIDE (IP): Performed by: HOSPITALIST

## 2019-06-14 PROCEDURE — A9270 NON-COVERED ITEM OR SERVICE: HCPCS | Performed by: HOSPITALIST

## 2019-06-14 RX ORDER — ACETAMINOPHEN 500 MG
1000 TABLET ORAL 3 TIMES DAILY
COMMUNITY
Start: 2019-06-14 | End: 2022-10-12

## 2019-06-14 RX ORDER — OMEPRAZOLE 20 MG/1
20 CAPSULE, DELAYED RELEASE ORAL DAILY
Qty: 30 CAP | Refills: 1 | Status: SHIPPED | OUTPATIENT
Start: 2019-06-14 | End: 2022-10-12

## 2019-06-14 RX ADMIN — OXYCODONE HYDROCHLORIDE 5 MG: 5 TABLET ORAL at 05:49

## 2019-06-14 RX ADMIN — ENOXAPARIN SODIUM 40 MG: 100 INJECTION SUBCUTANEOUS at 05:49

## 2019-06-14 RX ADMIN — OMEPRAZOLE 20 MG: 20 CAPSULE, DELAYED RELEASE ORAL at 05:49

## 2019-06-14 RX ADMIN — SODIUM CHLORIDE, POTASSIUM CHLORIDE, SODIUM LACTATE AND CALCIUM CHLORIDE: 600; 310; 30; 20 INJECTION, SOLUTION INTRAVENOUS at 05:50

## 2019-06-14 ASSESSMENT — GAIT ASSESSMENTS
GAIT LEVEL OF ASSIST: MODIFIED INDEPENDENT
DEVIATION: ANTALGIC;STEP TO
ASSISTIVE DEVICE: SINGLE POINT CANE
DISTANCE (FEET): 250

## 2019-06-14 ASSESSMENT — COGNITIVE AND FUNCTIONAL STATUS - GENERAL
MOBILITY SCORE: 24
SUGGESTED CMS G CODE MODIFIER MOBILITY: CH

## 2019-06-14 ASSESSMENT — PATIENT HEALTH QUESTIONNAIRE - PHQ9
2. FEELING DOWN, DEPRESSED, IRRITABLE, OR HOPELESS: NOT AT ALL
1. LITTLE INTEREST OR PLEASURE IN DOING THINGS: NOT AT ALL
SUM OF ALL RESPONSES TO PHQ9 QUESTIONS 1 AND 2: 0

## 2019-06-14 ASSESSMENT — ACTIVITIES OF DAILY LIVING (ADL): TOILETING: INDEPENDENT

## 2019-06-14 NOTE — PROGRESS NOTES
Discharging pt home per MD order. Discussed discharge instructions, follow up appointments, prescriptions, and home care. Pt voiding and ambulating without difficulty, pain controlled, tolerating diet. Family at bedside, all questions answered. Pt discharged off unit with hospital escort at 1213.

## 2019-06-14 NOTE — THERAPY
"Physical Therapy Evaluation completed.   Bed Mobility:     Transfers: Sit to Stand: Modified Independent  Gait: Level Of Assist: Modified Independent with Single Point Cane       Plan of Care: Patient with no further skilled PT needs in the acute care setting at this time  Discharge Recommendations: Equipment: Single Point Cane. Recommend outpatient transitional care services for continued physical therapy services.    See \"Rehab Therapy-Acute\" Patient Summary Report for complete documentation.     Pt was recently admitted for nausea/vomiting along with generalized weakness and fatigue. Pt also had recent L TKA on the 31st of May. Pt was able to demonstrate Mod I for all functional mobility with no deisy LOB with use of SPC. Pt was educated on using SPC at this time to promote improved gait mechaincs, as pt continues to demonstrate with step to gait pattern with antalgic gait mechanics. Pt demonstrates with signifcant weakness of quads on LLE, and was unable to perform SLR. Pt provided with supine ther-ex to increased strength and promote functional progression. Pt was able to demonstrate appropriate mechanics and was able to verbalize improtance of icing, LE elevation, LE positioning, and monitoring pain level and activity tolernace. Pt is in no acute skilled PT needs, however, will strongly recommend OP therapy services for optimal progression with functional mobility and return to an IPLOF.   "

## 2019-06-14 NOTE — PROGRESS NOTES
Pt requesting Tums. Hospitalist paged. Tums ordered. Pt also asking for tramadol.  States since pt is Ambien then he prefer res not to give her tramadol at this time.

## 2019-06-14 NOTE — DISCHARGE SUMMARY
Discharge Summary    CHIEF COMPLAINT ON ADMISSION  Chief Complaint   Patient presents with   • N/V       Reason for Admission  Vomiting     Admission Date  6/13/2019    CODE STATUS  Full Code    HPI & HOSPITAL COURSE  This is a 63 y.o. female here with vomiting, acid reflux, just went through total knee replacement and taking ibuprofen for pain, history of prior peptic ulcer disease. She was given IV fluids and antiemetics as well as prilosec with resolution of the vomiting. I recommend that she take the prilosec, stop taking ibuprofen and change to extra strength tylenol for knee pain.       Therefore, she is discharged in good and stable condition to home with close outpatient follow-up.        Discharge Date  6/14/19    FOLLOW UP ITEMS POST DISCHARGE  Primary care    DISCHARGE DIAGNOSES  Principal Problem:    TELMA (acute kidney injury) (HCC) POA: Unknown  Active Problems:    Depression with anxiety POA: Yes    Essential hypertension POA: Yes    Hypercholesteremia POA: Yes    Anemia POA: Unknown    Gastroesophageal reflux disease with esophagitis POA: Unknown    Hypokalemia POA: Unknown    History of left knee replacement POA: Unknown  Resolved Problems:    * No resolved hospital problems. *      FOLLOW UP  No future appointments.  No follow-up provider specified.    MEDICATIONS ON DISCHARGE     Medication List      START taking these medications      Instructions   acetaminophen 500 MG Tabs  Commonly known as:  TYLENOL   Take 2 Tabs by mouth 3 times a day.  Dose:  1000 mg     omeprazole 20 MG delayed-release capsule  Commonly known as:  PRILOSEC   Take 1 Cap by mouth every day.  Dose:  20 mg        CONTINUE taking these medications      Instructions   Aspirin 325 MG Tbec   Take 1 Tab by mouth 2 Times a Day for 30 days.  Dose:  325 mg     atorvastatin 20 MG Tabs  Commonly known as:  LIPITOR   Take 20 mg by mouth every evening.  Dose:  20 mg     lisinopril 10 MG Tabs  Commonly known as:  PRINIVIL   Take 10 mg by  mouth every evening.  Dose:  10 mg     oxyCODONE immediate release 10 MG immediate release tablet  Commonly known as:  ROXICODONE   Take 1 Tab by mouth every 3 hours as needed for up to 14 days.  Dose:  10 mg     sertraline 50 MG Tabs  Commonly known as:  ZOLOFT   Take 50 mg by mouth every evening.  Dose:  50 mg     zolpidem 5 MG Tabs  Commonly known as:  AMBIEN   Take 2.5-5 mg by mouth at bedtime as needed for Sleep.  Dose:  2.5-5 mg        STOP taking these medications    ibuprofen 800 MG Tabs  Commonly known as:  MOTRIN            Allergies  Allergies   Allergen Reactions   • Latex Anaphylaxis, Shortness of Breath and Swelling       DIET  Orders Placed This Encounter   Procedures   • Diet Order Regular     Standing Status:   Standing     Number of Occurrences:   1     Order Specific Question:   Diet:     Answer:   Regular [1]       ACTIVITY  As tolerated.  Weight bearing as tolerated    CONSULTATIONS  none    PROCEDURES  none    LABORATORY  Lab Results   Component Value Date    SODIUM 141 06/14/2019    POTASSIUM 3.8 06/14/2019    CHLORIDE 111 06/14/2019    CO2 22 06/14/2019    GLUCOSE 89 06/14/2019    BUN 18 06/14/2019    CREATININE 0.83 06/14/2019        Lab Results   Component Value Date    WBC 7.5 06/14/2019    HEMOGLOBIN 9.1 (L) 06/14/2019    HEMATOCRIT 28.4 (L) 06/14/2019    PLATELETCT 514 (H) 06/14/2019        Total time of the discharge process exceeds 35 minutes.

## 2019-06-14 NOTE — PROGRESS NOTES
"Received report from NOC RN; assumed pt care. Pt A&Ox4, sitting up in bed. Minimal pain noted to LLE. Pt states she \"feels better.\" No N/V noted. Heart burn noted. Pt notified to call for assistance.   "

## 2019-06-14 NOTE — DISCHARGE INSTRUCTIONS
Discharge Instructions    Discharged to home by car with relative. Discharged via wheelchair, hospital escort: Yes.  Special equipment needed: Not Applicable    Be sure to schedule a follow-up appointment with your primary care doctor or any specialists as instructed.     Discharge Plan:   Influenza Vaccine Indication: Patient Refuses    I understand that a diet low in cholesterol, fat, and sodium is recommended for good health. Unless I have been given specific instructions below for another diet, I accept this instruction as my diet prescription.   Other diet: Regular    Special Instructions: None    · Is patient discharged on Warfarin / Coumadin?   No     Depression / Suicide Risk    As you are discharged from this [facility] facility, it is important to learn how to keep safe from harming yourself.    Recognize the warning signs:  · Abrupt changes in personality, positive or negative- including increase in energy   · Giving away possessions  · Change in eating patterns- significant weight changes-  positive or negative  · Change in sleeping patterns- unable to sleep or sleeping all the time   · Unwillingness or inability to communicate  · Depression  · Unusual sadness, discouragement and loneliness  · Talk of wanting to die  · Neglect of personal appearance   · Rebelliousness- reckless behavior  · Withdrawal from people/activities they love  · Confusion- inability to concentrate     If you or a loved one observes any of these behaviors or has concerns about self-harm, here's what you can do:  · Talk about it- your feelings and reasons for harming yourself  · Remove any means that you might use to hurt yourself (examples: pills, rope, extension cords, firearm)  · Get professional help from the community (Mental Health, Substance Abuse, psychological counseling)  · Do not be alone:Call your Safe Contact- someone whom you trust who will be there for you.  · Call your local CRISIS HOTLINE 786-5400 or  525-169-3857  · Call your local Children's Mobile Crisis Response Team [place] (427) 048-6007 or www.[employer].ClearFit  · Call the toll free National Suicide Prevention Hotlines   · National Suicide Prevention Lifeline 045-062-UUEI (4601)  · National [employer] Loyalty Line Network 800-SUICIDE (222-1602)

## 2019-06-14 NOTE — PROGRESS NOTES
Pt arrived on unit, assumed care of pt.   POC and medications reviewed with pt. Pt verbalized understanding.   AOx4  Denies pain, SOB, or dizziness at this time.   Dressing to L knee in place (ACE wrap-sx on 5/31)  Safety measures in place.  Hourly rounding in place.

## 2019-06-14 NOTE — THERAPY
"Occupational Therapy Evaluation completed.   Functional Status:  Pt is a 62 y/o female, admit with weakness, nausea and vomiting- following d/c after recent TKA. Pt lives with her adult children and younger children- who can assist with care after d/c. Pt PLOF- I with ADLS and functional mobility, was active. Pt presents with minimal c/o pain with ADLS and functional mobility, is at the I to Supervised level. Pt was educated regarding techniques for ADLS and functional mobility. Pt will be seen for initial evaluation and treatment only, as she is functional in this setting.  Plan of Care: Patient with no further skilled OT needs in the acute care setting at this time  Discharge Recommendations:  Equipment: No Equipment Needed. Post-acute therapy Currently anticipate no further skilled therapy needs once patient is discharged from the inpatient setting.    See \"Rehab Therapy-Acute\" Patient Summary Report for complete documentation.    "

## 2019-06-14 NOTE — PROGRESS NOTES
2 RN skin assessment done; skin not WDL. Healing surgical incision to L knee with ACE bandage as dressing.

## 2019-06-16 LAB
BACTERIA UR CULT: NORMAL
SIGNIFICANT IND 70042: NORMAL
SITE SITE: NORMAL
SOURCE SOURCE: NORMAL

## 2019-06-24 ENCOUNTER — PHYSICAL THERAPY (OUTPATIENT)
Dept: PHYSICAL THERAPY | Facility: REHABILITATION | Age: 64
End: 2019-06-24
Attending: ORTHOPAEDIC SURGERY
Payer: MEDICAID

## 2019-06-24 DIAGNOSIS — Z96.652 TOTAL KNEE REPLACEMENT STATUS, LEFT: ICD-10-CM

## 2019-06-24 PROCEDURE — 97161 PT EVAL LOW COMPLEX 20 MIN: CPT

## 2019-06-24 SDOH — ECONOMIC STABILITY: GENERAL: QUALITY OF LIFE: GOOD

## 2019-06-24 ASSESSMENT — ENCOUNTER SYMPTOMS
ALLEVIATING FACTORS: ELEVATION
EXACERBATED BY: IMMOBILITY
PAIN TIMING: CONSTANT
QUALITY: SHARP
QUALITY: TIGHT
ALLEVIATING FACTORS: ICE
PAIN SCALE: 3
EXACERBATED BY: PROLONGED STANDING
PAIN SCALE AT HIGHEST: 7
PAIN SCALE AT LOWEST: 1

## 2019-06-24 NOTE — OP THERAPY EVALUATION
Outpatient Physical Therapy  INITIAL EVALUATION    [facility] [address]  [address]  [place] [address]  Phone:  [phone]  Fax:  [phone]    Date of Evaluation: 2019    Patient: Kendra Winters  YOB: 1955  MRN: [id number]     Referring Provider: Marco Antonio Friend M.D.  [address] [facility]  [address]  [place] [address]   Referring Diagnosis S/P Left total knee arthroplasty     Time Calculation  Start time: 1502  Stop time: 1600 Time Calculation (min): 58 minutes     Physical Therapy Occurrence Codes    Date of onset of impairment:  19   Date physical therapy care plan established or reviewed:  19   Date physical therapy treatment started:  19          Chief Complaint: Knee Problem    Visit Diagnoses     ICD-10-CM   1. Total knee replacement status, left Z96.652         Subjective:   History of Present Illness:     Date of surgery:  2019    Mechanism of injury:  Patient is a 63 year old female that underwent L TKA 19 after receiving 3 cortisone injections that offered minimal pain relief. Patient reports increase in pain with increasing mobility. Reports that she went to the ER  due to N+V; symptoms resolved with changes in medication.     PMHx not listed in patient's chart include: Right TKA , polio   Quality of life:  Good  Prior level of function:  Independent   Headaches:  no headaches  Ear problems: none  Sleep disturbance:  Interrupted sleep  Pain:     Current pain rating:  3    At best pain ratin    At worst pain ratin    Location:  Left knee up into left thigh     Quality:  Tight and sharp    Pain timing:  Constant    Relieving factors:  Ice and elevation    Aggravating factors:  Immobility and prolonged standing    Progression:  Improving    Pain Comments::  Tylenol used for pain management   Social Support:     Lives in:  Multiple-level home (2 story home)    Lives with:  Adult children (3  "children. Rina present for evaluation. )  Treatments:     Previous treatment:  Injection treatment    Current treatment:  Ice, medication and stretching    Discharged from (in last 30 days): inpatient      Treatment Comments:  Discharged from inpatient therapy with the following HEP: QS, seated knee flexion stretch, long sitting knee extension stretch, supine SLR, standing marching, and standing hip abduction.   Activities of Daily Living:     Patient reported ADL status: Independent with all ADLs    Works at [employer] () - requires prolonged standing. Hoping to return part time starting 8/1/19  Patient Goals:     Patient goals for therapy:  Return to work    Other patient goals:  Full ROM, minimal stiffness, zero pain.       Past Medical History:   Diagnosis Date   • Adverse effect of anesthesia     \"dizzy\" post op   • Anesthesia     slow to wake up   • Arthritis     knees   • Bronchitis 2013   • C. difficile colitis    • C. difficile diarrhea 5/2013    resolved since 1/2014   • Cancer (HCC) 2008    skin cancer   • Cataract     no sx   • Diverticulitis    • High cholesterol    • Hypertension    • Insomnia related to another mental disorder     stress   • Pain     right knee   • Pneumonia 2011   • PONV (postoperative nausea and vomiting)    • Psychiatric disturbance     stress   • ULCERATIVE COLITIS      Past Surgical History:   Procedure Laterality Date   • PB TOTAL KNEE ARTHROPLASTY Left 5/31/2019    Procedure: ARTHROPLASTY, KNEE, TOTAL;  Surgeon: Marco Antonio Friend M.D.;  Location: [facility];  Service: Orthopedics   • KNEE ARTHROPLASTY TOTAL  9/26/2014    Performed by Marco Antonio Friend M.D. at [facility]   • FECAL TRANSPLANT  11/05/2013    x3   • WRIST ORIF  9/5/2013    Performed by Teto Hernandez M.D. at [facility]   • HEMORRHOIDECTOMY     • OTHER ORTHOPEDIC SURGERY      knee    • TONSILLECTOMY       Social History   Substance Use Topics   • Smoking " status: Former Smoker     Packs/day: 0.50     Years: 20.00     Types: Cigarettes     Quit date: 2/20/2013   • Smokeless tobacco: Never Used      Comment: 2 cig /week   • Alcohol use Yes      Comment: 2-3 per week     Family and Occupational History     Social History   • Marital status:      Spouse name: N/A   • Number of children: N/A   • Years of education: N/A       Objective     Active Range of Motion   Left Knee   Flexion: 69 degrees   Extension: -10 degrees     Right Knee   Flexion: 123 degrees   Extension: -2 degrees     Patellar Mobility   Left Knee Patellar tendons within functional limits include the medial and lateral. Hypomobile in the left superior and left inferior patellar tendon(s).     Right Knee Patellar tendons within functional limits include the medial, lateral, superior and inferior.     Strength:      Left Hip   Planes of Motion   Flexion: 4-  Extension: 4-  Abduction: 4-    Right Hip   Normal muscle strength    Left Knee   Flexion: 3+  Extension: 3+  Quadriceps contraction: fair    Right Knee   Normal strength    General Comments     Knee Comments  Mild swelling of L knee joint with tenderness to palpation along joint line. No pitting edema observed.   Ambulates with [facility] for community. Indoor ambulation note decreased L weight shift, decreased L knee flexion, and decreased heel strike at initial contact.       Exercises/Treatment  Time-based treatments/modalities:          Assessment, Response and Plan:   Impairments: abnormal gait, abnormal or restricted ROM and swelling    Assessment details:  Patient has responded well to L TKA, but has poor L knee range of motion limiting functional mobility. She would benefit from skilled physical therapy to address ROM, strength, and mobility.   Barriers to therapy:  None  Prognosis: good    Prognosis details:  Patient is highly motivated and willing to participate in therapy to regain ROM.   Goals:   Short Term Goals:   1. Patient will  demonstrate tolerance to therapeutic exercises to improve ROM and strength.     Short term goal time span:  4-6 weeks      Long Term Goals:    1. Patient will demonstrate L knee ROM within functional ranges.   2. Patient will perform all ambulation without assistive device.   3. Patient will return to all job duties.   4. Patient will be independent with HEP for stretching, strengthening, and mobility.   Long term goal time span:  6-8 weeks    Plan:   Therapy options:  Physical therapy treatment to continue  Planned therapy interventions:  Neuromuscular Re-education (CPT 48654), Therapeutic Exercise (CPT 10695) and E Stim Unattended (CPT 57726)  Frequency:  2x week  Duration in weeks:  9  Duration in visits:  12  Discussed with:  Patient      Functional Limitations and Severity Modifiers  WOMAC Grand Total: 38.54   Current:     Goal:       Referring provider co-signature:  I have reviewed this plan of care and my co-signature certifies the need for services.  Certification Dates:   From 6/24/19     To 8/26/19    Physician Signature: ________________________________ Date: ______________

## 2019-06-24 NOTE — PATIENT INSTRUCTIONS
Educated patient in process to obtain authorization and continuing provided HEP to improve strength and ROM.

## 2019-06-26 ENCOUNTER — APPOINTMENT (OUTPATIENT)
Dept: PHYSICAL THERAPY | Facility: REHABILITATION | Age: 64
End: 2019-06-26
Attending: ORTHOPAEDIC SURGERY
Payer: MEDICAID

## 2019-07-01 ENCOUNTER — APPOINTMENT (OUTPATIENT)
Dept: PHYSICAL THERAPY | Facility: REHABILITATION | Age: 64
End: 2019-07-01
Attending: ORTHOPAEDIC SURGERY
Payer: MEDICAID

## 2019-07-03 ENCOUNTER — APPOINTMENT (OUTPATIENT)
Dept: PHYSICAL THERAPY | Facility: REHABILITATION | Age: 64
End: 2019-07-03
Attending: ORTHOPAEDIC SURGERY
Payer: MEDICAID

## 2019-07-08 ENCOUNTER — APPOINTMENT (OUTPATIENT)
Dept: PHYSICAL THERAPY | Facility: REHABILITATION | Age: 64
End: 2019-07-08
Attending: ORTHOPAEDIC SURGERY
Payer: MEDICAID

## 2019-07-10 ENCOUNTER — APPOINTMENT (OUTPATIENT)
Dept: PHYSICAL THERAPY | Facility: REHABILITATION | Age: 64
End: 2019-07-10
Attending: ORTHOPAEDIC SURGERY
Payer: MEDICAID

## 2019-07-12 ENCOUNTER — TELEPHONE (OUTPATIENT)
Dept: PHYSICAL THERAPY | Facility: REHABILITATION | Age: 64
End: 2019-07-12

## 2019-07-13 NOTE — OP THERAPY DISCHARGE SUMMARY
Outpatient Physical Therapy  DISCHARGE SUMMARY NOTE      [facility] [address].  [address]  [place] [address]  Phone:  [phone]  Fax:  [phone]    Date of Visit: 07/12/2019    Patient: Kendra Winters  YOB: 1955  MRN: [id number]     Referring Provider: No referring provider defined for this encounter.   Referring Diagnosis No admission diagnoses are documented for this encounter.     Physical Therapy Occurrence Codes    Date of onset of impairment:  5/31/19   Date physical therapy care plan established or reviewed:  6/24/19   Date physical therapy treatment started:  6/24/19          Functional Limitations and Severity Modifiers                    Your patient is being discharged from Physical Therapy with the following comments:   · Goals not met    Comments:  Patient underwent initial examination and then determined that another location was more convenient for treatment.      Limitations Remaining:  No changes from initial evaluation     Recommendations:  Pursue PT to address knee ROM and strength impairments.     Sahra Vang PT, DPT    Date: 7/12/2019

## 2019-07-15 ENCOUNTER — APPOINTMENT (OUTPATIENT)
Dept: PHYSICAL THERAPY | Facility: REHABILITATION | Age: 64
End: 2019-07-15
Attending: ORTHOPAEDIC SURGERY
Payer: MEDICAID

## 2019-07-17 ENCOUNTER — APPOINTMENT (OUTPATIENT)
Dept: PHYSICAL THERAPY | Facility: REHABILITATION | Age: 64
End: 2019-07-17
Attending: ORTHOPAEDIC SURGERY
Payer: MEDICAID

## 2020-02-23 ENCOUNTER — APPOINTMENT (OUTPATIENT)
Dept: RADIOLOGY | Facility: MEDICAL CENTER | Age: 65
End: 2020-02-23
Attending: EMERGENCY MEDICINE
Payer: MEDICAID

## 2020-02-23 ENCOUNTER — HOSPITAL ENCOUNTER (EMERGENCY)
Facility: MEDICAL CENTER | Age: 65
End: 2020-02-23
Attending: EMERGENCY MEDICINE
Payer: MEDICAID

## 2020-02-23 VITALS
BODY MASS INDEX: 22.58 KG/M2 | DIASTOLIC BLOOD PRESSURE: 83 MMHG | TEMPERATURE: 101.5 F | OXYGEN SATURATION: 98 % | HEART RATE: 107 BPM | RESPIRATION RATE: 18 BRPM | SYSTOLIC BLOOD PRESSURE: 147 MMHG | HEIGHT: 64 IN | WEIGHT: 132.28 LBS

## 2020-02-23 DIAGNOSIS — R11.2 NAUSEA AND VOMITING, INTRACTABILITY OF VOMITING NOT SPECIFIED, UNSPECIFIED VOMITING TYPE: ICD-10-CM

## 2020-02-23 DIAGNOSIS — J10.1 INFLUENZA A: ICD-10-CM

## 2020-02-23 LAB
ALBUMIN SERPL BCP-MCNC: 4.4 G/DL (ref 3.2–4.9)
ALBUMIN/GLOB SERPL: 1.7 G/DL
ALP SERPL-CCNC: 81 U/L (ref 30–99)
ALT SERPL-CCNC: 16 U/L (ref 2–50)
ANION GAP SERPL CALC-SCNC: 15 MMOL/L (ref 7–16)
APPEARANCE UR: CLEAR
AST SERPL-CCNC: 22 U/L (ref 12–45)
BACTERIA #/AREA URNS HPF: ABNORMAL /HPF
BASOPHILS # BLD AUTO: 0.7 % (ref 0–1.8)
BASOPHILS # BLD: 0.04 K/UL (ref 0–0.12)
BILIRUB SERPL-MCNC: 0.5 MG/DL (ref 0.1–1.5)
BILIRUB UR QL STRIP.AUTO: ABNORMAL
BUN SERPL-MCNC: 12 MG/DL (ref 8–22)
CALCIUM SERPL-MCNC: 9.2 MG/DL (ref 8.4–10.2)
CHLORIDE SERPL-SCNC: 102 MMOL/L (ref 96–112)
CO2 SERPL-SCNC: 16 MMOL/L (ref 20–33)
COLOR UR: YELLOW
CREAT SERPL-MCNC: 0.64 MG/DL (ref 0.5–1.4)
EOSINOPHIL # BLD AUTO: 0.01 K/UL (ref 0–0.51)
EOSINOPHIL NFR BLD: 0.2 % (ref 0–6.9)
EPI CELLS #/AREA URNS HPF: ABNORMAL /HPF
ERYTHROCYTE [DISTWIDTH] IN BLOOD BY AUTOMATED COUNT: 42.3 FL (ref 35.9–50)
FLUAV RNA SPEC QL NAA+PROBE: POSITIVE
FLUBV RNA SPEC QL NAA+PROBE: NEGATIVE
GLOBULIN SER CALC-MCNC: 2.6 G/DL (ref 1.9–3.5)
GLUCOSE SERPL-MCNC: 101 MG/DL (ref 65–99)
GLUCOSE UR STRIP.AUTO-MCNC: NEGATIVE MG/DL
HCT VFR BLD AUTO: 41 % (ref 37–47)
HGB BLD-MCNC: 13.6 G/DL (ref 12–16)
IMM GRANULOCYTES # BLD AUTO: 0.02 K/UL (ref 0–0.11)
IMM GRANULOCYTES NFR BLD AUTO: 0.3 % (ref 0–0.9)
KETONES UR STRIP.AUTO-MCNC: >=80 MG/DL
LEUKOCYTE ESTERASE UR QL STRIP.AUTO: NEGATIVE
LIPASE SERPL-CCNC: 15 U/L (ref 7–58)
LYMPHOCYTES # BLD AUTO: 0.37 K/UL (ref 1–4.8)
LYMPHOCYTES NFR BLD: 6.1 % (ref 22–41)
MCH RBC QN AUTO: 29.1 PG (ref 27–33)
MCHC RBC AUTO-ENTMCNC: 33.2 G/DL (ref 33.6–35)
MCV RBC AUTO: 87.8 FL (ref 81.4–97.8)
MICRO URNS: ABNORMAL
MONOCYTES # BLD AUTO: 0.47 K/UL (ref 0–0.85)
MONOCYTES NFR BLD AUTO: 7.7 % (ref 0–13.4)
MUCOUS THREADS #/AREA URNS HPF: ABNORMAL /HPF
NEUTROPHILS # BLD AUTO: 5.17 K/UL (ref 2–7.15)
NEUTROPHILS NFR BLD: 85 % (ref 44–72)
NITRITE UR QL STRIP.AUTO: NEGATIVE
NRBC # BLD AUTO: 0 K/UL
NRBC BLD-RTO: 0 /100 WBC
PH UR STRIP.AUTO: 5.5 [PH] (ref 5–8)
PLATELET # BLD AUTO: 210 K/UL (ref 164–446)
PMV BLD AUTO: 8.9 FL (ref 9–12.9)
POTASSIUM SERPL-SCNC: 3.6 MMOL/L (ref 3.6–5.5)
PROT SERPL-MCNC: 7 G/DL (ref 6–8.2)
PROT UR QL STRIP: NEGATIVE MG/DL
RBC # BLD AUTO: 4.67 M/UL (ref 4.2–5.4)
RBC # URNS HPF: ABNORMAL /HPF
RBC UR QL AUTO: ABNORMAL
SODIUM SERPL-SCNC: 133 MMOL/L (ref 135–145)
SP GR UR REFRACTOMETRY: 1.03
WBC # BLD AUTO: 6.1 K/UL (ref 4.8–10.8)
WBC #/AREA URNS HPF: ABNORMAL /HPF

## 2020-02-23 PROCEDURE — 81001 URINALYSIS AUTO W/SCOPE: CPT

## 2020-02-23 PROCEDURE — 85025 COMPLETE CBC W/AUTO DIFF WBC: CPT

## 2020-02-23 PROCEDURE — 700111 HCHG RX REV CODE 636 W/ 250 OVERRIDE (IP): Performed by: EMERGENCY MEDICINE

## 2020-02-23 PROCEDURE — 87502 INFLUENZA DNA AMP PROBE: CPT

## 2020-02-23 PROCEDURE — 80053 COMPREHEN METABOLIC PANEL: CPT

## 2020-02-23 PROCEDURE — 700105 HCHG RX REV CODE 258: Performed by: EMERGENCY MEDICINE

## 2020-02-23 PROCEDURE — 74022 RADEX COMPL AQT ABD SERIES: CPT

## 2020-02-23 PROCEDURE — 99285 EMERGENCY DEPT VISIT HI MDM: CPT

## 2020-02-23 PROCEDURE — 96374 THER/PROPH/DIAG INJ IV PUSH: CPT

## 2020-02-23 PROCEDURE — 700102 HCHG RX REV CODE 250 W/ 637 OVERRIDE(OP): Performed by: EMERGENCY MEDICINE

## 2020-02-23 PROCEDURE — 36415 COLL VENOUS BLD VENIPUNCTURE: CPT

## 2020-02-23 PROCEDURE — 83690 ASSAY OF LIPASE: CPT

## 2020-02-23 PROCEDURE — A9270 NON-COVERED ITEM OR SERVICE: HCPCS | Performed by: EMERGENCY MEDICINE

## 2020-02-23 RX ORDER — SODIUM CHLORIDE 9 MG/ML
1000 INJECTION, SOLUTION INTRAVENOUS ONCE
Status: COMPLETED | OUTPATIENT
Start: 2020-02-23 | End: 2020-02-23

## 2020-02-23 RX ORDER — ACETAMINOPHEN 325 MG/1
650 TABLET ORAL ONCE
Status: COMPLETED | OUTPATIENT
Start: 2020-02-23 | End: 2020-02-23

## 2020-02-23 RX ORDER — DICYCLOMINE HYDROCHLORIDE 10 MG/1
10 CAPSULE ORAL
Qty: 40 CAP | Refills: 0 | Status: SHIPPED | OUTPATIENT
Start: 2020-02-23 | End: 2020-03-04

## 2020-02-23 RX ORDER — OSELTAMIVIR PHOSPHATE 75 MG/1
75 CAPSULE ORAL ONCE
Status: COMPLETED | OUTPATIENT
Start: 2020-02-23 | End: 2020-02-23

## 2020-02-23 RX ORDER — ONDANSETRON 2 MG/ML
4 INJECTION INTRAMUSCULAR; INTRAVENOUS ONCE
Status: COMPLETED | OUTPATIENT
Start: 2020-02-23 | End: 2020-02-23

## 2020-02-23 RX ORDER — OSELTAMIVIR PHOSPHATE 75 MG/1
75 CAPSULE ORAL 2 TIMES DAILY
Qty: 10 CAP | Refills: 0 | Status: SHIPPED | OUTPATIENT
Start: 2020-02-23 | End: 2020-11-26

## 2020-02-23 RX ORDER — ONDANSETRON 4 MG/1
4 TABLET, ORALLY DISINTEGRATING ORAL EVERY 6 HOURS PRN
Qty: 15 TAB | Refills: 0 | Status: SHIPPED | OUTPATIENT
Start: 2020-02-23 | End: 2020-03-04

## 2020-02-23 RX ADMIN — ACETAMINOPHEN 650 MG: 325 TABLET, FILM COATED ORAL at 18:40

## 2020-02-23 RX ADMIN — ONDANSETRON 4 MG: 2 INJECTION INTRAMUSCULAR; INTRAVENOUS at 18:00

## 2020-02-23 RX ADMIN — SODIUM CHLORIDE 1000 ML: 9 INJECTION, SOLUTION INTRAVENOUS at 18:00

## 2020-02-23 RX ADMIN — OSELTAMIVIR PHOSPHATE 75 MG: 75 CAPSULE ORAL at 18:43

## 2020-02-24 NOTE — ED PROVIDER NOTES
ED Provider Note    CHIEF COMPLAINT  Chief Complaint   Patient presents with   • N/V   • Abdominal Pain       HPI  Kendra Tracey Winters is a 64 y.o. female with history of hypertension, hyperlipidemia, ulcerative colitis and GERD here with chief complaint of cough, myalgias, subjective fever and chills.  Patient reports symptoms for the last 3 days.  Cough is minimally productive of white sputum.  Patient also reports that she has had some nausea and vomiting with the symptoms.  She reports she has had around 13 episodes of vomiting which are nonbloody, she reports they are yellowish-green.  She also reports she has had some mild diarrhea, which is nonbloody without any black stool.  She reports that she occasionally gets some cramping abdominal pain but this is sporadic and random, it is also migratory and she is unable to identify a specific spot that is consistently painful.  She denies any dysuria hematuria urgency or frequency.    REVIEW OF SYSTEMS  ROS    See HPI for further details. All other systems are negative.     PAST MEDICAL HISTORY   has a past medical history of Adverse effect of anesthesia, Anesthesia, Arthritis, Bronchitis (), C. difficile colitis, C. difficile diarrhea (2013), Cancer (HCC) (), Cataract, Diverticulitis, High cholesterol, Hypertension, Insomnia related to another mental disorder, Pain, Pneumonia (), PONV (postoperative nausea and vomiting), Psychiatric disturbance, and ULCERATIVE COLITIS.    SOCIAL HISTORY  Social History     Tobacco Use   • Smoking status: Former Smoker     Packs/day: 0.50     Years: 20.00     Pack years: 10.00     Types: Cigarettes     Last attempt to quit: 2013     Years since quittin.0   • Smokeless tobacco: Never Used   • Tobacco comment: 2 cig /week   Substance and Sexual Activity   • Alcohol use: Yes     Comment: 2-3 per week   • Drug use: No   • Sexual activity: Not Currently       SURGICAL HISTORY   has a past surgical  history that includes other orthopedic surgery; tonsillectomy; wrist orif (9/5/2013); fecal transplant (11/05/2013); knee arthroplasty total (9/26/2014); hemorrhoidectomy; and total knee arthroplasty (Left, 5/31/2019).    CURRENT MEDICATIONS  Home Medications     Reviewed by Easton Yang R.N. (Registered Nurse) on 02/23/20 at 1707  Med List Status: Partial   Medication Last Dose Status   acetaminophen (TYLENOL) 500 MG Tab PRN Active   atorvastatin (LIPITOR) 20 MG Tab 2/22/2020 Active   lisinopril (PRINIVIL) 10 MG Tab 2/22/2020 Active   omeprazole (PRILOSEC) 20 MG delayed-release capsule 2/22/2020 Active   sertraline (ZOLOFT) 50 MG Tab Unknown Active   zolpidem (AMBIEN) 5 MG Tab PRN Active                ALLERGIES  Allergies   Allergen Reactions   • Latex Anaphylaxis, Shortness of Breath and Swelling       PHYSICAL EXAM  Physical Exam   Constitutional: She is oriented to person, place, and time. She appears well-developed and well-nourished.   HENT:   Head: Normocephalic and atraumatic.   Eyes: Conjunctivae are normal.   Neck: Normal range of motion. Neck supple.   Cardiovascular: Normal rate and regular rhythm.   Pulmonary/Chest: Effort normal and breath sounds normal.   Abdominal: Soft. Bowel sounds are normal. She exhibits no distension. There is no abdominal tenderness. There is no rebound.   Neurological: She is alert and oriented to person, place, and time.   Skin: Skin is warm and dry. No rash noted.   Psychiatric: She has a normal mood and affect. Her behavior is normal.         DIAGNOSTIC STUDIES / PROCEDURES      LABS  Results for orders placed or performed during the hospital encounter of 02/23/20   CBC WITH DIFFERENTIAL   Result Value Ref Range    WBC 6.1 4.8 - 10.8 K/uL    RBC 4.67 4.20 - 5.40 M/uL    Hemoglobin 13.6 12.0 - 16.0 g/dL    Hematocrit 41.0 37.0 - 47.0 %    MCV 87.8 81.4 - 97.8 fL    MCH 29.1 27.0 - 33.0 pg    MCHC 33.2 (L) 33.6 - 35.0 g/dL    RDW 42.3 35.9 - 50.0 fL    Platelet Count 210  164 - 446 K/uL    MPV 8.9 (L) 9.0 - 12.9 fL    Neutrophils-Polys 85.00 (H) 44.00 - 72.00 %    Lymphocytes 6.10 (L) 22.00 - 41.00 %    Monocytes 7.70 0.00 - 13.40 %    Eosinophils 0.20 0.00 - 6.90 %    Basophils 0.70 0.00 - 1.80 %    Immature Granulocytes 0.30 0.00 - 0.90 %    Nucleated RBC 0.00 /100 WBC    Neutrophils (Absolute) 5.17 2.00 - 7.15 K/uL    Lymphs (Absolute) 0.37 (L) 1.00 - 4.80 K/uL    Monos (Absolute) 0.47 0.00 - 0.85 K/uL    Eos (Absolute) 0.01 0.00 - 0.51 K/uL    Baso (Absolute) 0.04 0.00 - 0.12 K/uL    Immature Granulocytes (abs) 0.02 0.00 - 0.11 K/uL    NRBC (Absolute) 0.00 K/uL   COMP METABOLIC PANEL   Result Value Ref Range    Sodium 133 (L) 135 - 145 mmol/L    Potassium 3.6 3.6 - 5.5 mmol/L    Chloride 102 96 - 112 mmol/L    Co2 16 (L) 20 - 33 mmol/L    Anion Gap 15.0 7.0 - 16.0    Glucose 101 (H) 65 - 99 mg/dL    Bun 12 8 - 22 mg/dL    Creatinine 0.64 0.50 - 1.40 mg/dL    Calcium 9.2 8.4 - 10.2 mg/dL    AST(SGOT) 22 12 - 45 U/L    ALT(SGPT) 16 2 - 50 U/L    Alkaline Phosphatase 81 30 - 99 U/L    Total Bilirubin 0.5 0.1 - 1.5 mg/dL    Albumin 4.4 3.2 - 4.9 g/dL    Total Protein 7.0 6.0 - 8.2 g/dL    Globulin 2.6 1.9 - 3.5 g/dL    A-G Ratio 1.7 g/dL   LIPASE   Result Value Ref Range    Lipase 15 7 - 58 U/L   URINALYSIS,CULTURE IF INDICATED   Result Value Ref Range    Color Yellow     Character Clear     Ph 5.5 5.0 - 8.0    Glucose Negative Negative mg/dL    Ketones >=80 (A) Negative mg/dL    Protein Negative Negative mg/dL    Bilirubin Small (A) Negative    Nitrite Negative Negative    Leukocyte Esterase Negative Negative    Occult Blood Small (A) Negative    Micro Urine Req Microscopic    Influenza A/B By PCR (Adult - Flu Only)   Result Value Ref Range    Influenza virus A RNA POSITIVE (A) Negative    Influenza virus B, PCR Negative Negative   REFRACTOMETER SG   Result Value Ref Range    Specific Gravity 1.027    URINE MICROSCOPIC (W/UA)   Result Value Ref Range    WBC 0-2 /hpf    RBC 2-5 (A)  /hpf    Bacteria Rare (A) None /hpf    Epithelial Cells Rare Few /hpf    Mucous Threads Few /hpf   ESTIMATED GFR   Result Value Ref Range    GFR If African American >60 >60 mL/min/1.73 m 2    GFR If Non African American >60 >60 mL/min/1.73 m 2         RADIOLOGY  DX-ABDOMEN COMPLETE WITH AP OR PA CXR   Final Result      No evidence of bowel obstruction.          COURSE & MEDICAL DECISION MAKING  Pertinent Labs & Imaging studies reviewed. (See chart for details)    Patient here with symptoms most consistent with influenza.  Her abdominal exam is entirely benign believe a surgical pathology is highly unlikely.  Will check chest x-ray with associated abdominal x-ray to evaluate for significant findings of bowel obstruction though this is highly unlikely especially given patient's exam.  Patient does appear mildly dehydrated therefore has been given IV fluids.  Patient is able to tolerate fluids in the emergency department following Zofran  Patient is feeling considerably improved following Zofran IV fluids and oral fluids.  Patient's influenza is positive.  Patient's basic labs are otherwise unrevealing.  Patient be discharged home with Tamiflu, Zofran  I discussed the return precautions in depth with patient.     The patient will return for worsening symptoms and is stable at the time of discharge. The patient verbalizes understanding and will comply.    FINAL IMPRESSION    1. Influenza A    2. Nausea and vomiting, intractability of vomiting not specified, unspecified vomiting type               Electronically signed by: Andrés Mcfadden M.D., 2/23/2020 5:17 PM

## 2020-02-24 NOTE — DISCHARGE INSTRUCTIONS
You have the flu, we will send you home with a course of Tamiflu for your symptoms as well as Zofran and Bentyl for your stomach symptoms.  If you develop severe abdominal pain, are unable to pass gas or stool or find a considerable amount of blood in your stool or have any other concerns please follow-up with the emergency department.  Otherwise please follow-up with your GI specialist and your primary care physician.

## 2020-02-24 NOTE — ED TRIAGE NOTES
"Presents complaining of mild, diffuse abdominal pain with associated episodes of N/V recurring for the past 2 days.  Chief Complaint   Patient presents with   • N/V   • Abdominal Pain     /84   Pulse 100   Temp 37.5 °C (99.5 °F) (Temporal)   Resp 18   Ht 1.626 m (5' 4\")   Wt 60 kg (132 lb 4.4 oz)   SpO2 98%   BMI 22.71 kg/m²      "

## 2020-02-24 NOTE — ED NOTES
[facility] from Lab called with critical result of Influenza A + at 1837. Critical lab result read back to [facility].   Dr. Mcfadden notified of critical lab result at 1840.  Critical lab result read back by Dr. Mcfadden.

## 2020-02-24 NOTE — ED NOTES
Return from imaging, no changes.  Medicated as ordered and NS infusing.  Son remains at BS and call light in reach.

## 2020-02-24 NOTE — ED NOTES
Discharge and f/u instructions discussed and understanding verbalized.  Ambulatory out of ED.  RX x 3 sent electronically.  Work note given.

## 2020-11-26 ENCOUNTER — OFFICE VISIT (OUTPATIENT)
Dept: URGENT CARE | Facility: CLINIC | Age: 65
End: 2020-11-26
Payer: MEDICARE

## 2020-11-26 ENCOUNTER — HOSPITAL ENCOUNTER (OUTPATIENT)
Facility: MEDICAL CENTER | Age: 65
End: 2020-11-26
Attending: FAMILY MEDICINE
Payer: MEDICARE

## 2020-11-26 VITALS
OXYGEN SATURATION: 100 % | RESPIRATION RATE: 20 BRPM | WEIGHT: 127 LBS | SYSTOLIC BLOOD PRESSURE: 120 MMHG | TEMPERATURE: 97.3 F | BODY MASS INDEX: 21.68 KG/M2 | DIASTOLIC BLOOD PRESSURE: 66 MMHG | HEART RATE: 64 BPM | HEIGHT: 64 IN

## 2020-11-26 DIAGNOSIS — J98.8 RTI (RESPIRATORY TRACT INFECTION): ICD-10-CM

## 2020-11-26 LAB
COVID ORDER STATUS COVID19: NORMAL
FLUAV+FLUBV AG SPEC QL IA: NORMAL
INT CON NEG: NORMAL
INT CON NEG: NORMAL
INT CON POS: NORMAL
INT CON POS: NORMAL
S PYO AG THROAT QL: NEGATIVE

## 2020-11-26 PROCEDURE — 87880 STREP A ASSAY W/OPTIC: CPT | Performed by: FAMILY MEDICINE

## 2020-11-26 PROCEDURE — 87804 INFLUENZA ASSAY W/OPTIC: CPT | Performed by: FAMILY MEDICINE

## 2020-11-26 PROCEDURE — U0003 INFECTIOUS AGENT DETECTION BY NUCLEIC ACID (DNA OR RNA); SEVERE ACUTE RESPIRATORY SYNDROME CORONAVIRUS 2 (SARS-COV-2) (CORONAVIRUS DISEASE [COVID-19]), AMPLIFIED PROBE TECHNIQUE, MAKING USE OF HIGH THROUGHPUT TECHNOLOGIES AS DESCRIBED BY CMS-2020-01-R: HCPCS

## 2020-11-26 PROCEDURE — 99214 OFFICE O/P EST MOD 30 MIN: CPT | Performed by: FAMILY MEDICINE

## 2020-11-26 RX ORDER — AZITHROMYCIN 250 MG/1
TABLET, FILM COATED ORAL
Qty: 6 TAB | Refills: 0 | Status: SHIPPED | OUTPATIENT
Start: 2020-11-26 | End: 2022-10-12

## 2020-11-26 RX ORDER — AZITHROMYCIN 250 MG/1
TABLET, FILM COATED ORAL
Qty: 6 TAB | Refills: 0 | Status: SHIPPED | OUTPATIENT
Start: 2020-11-26 | End: 2020-11-26 | Stop reason: SDUPTHER

## 2020-11-26 ASSESSMENT — ENCOUNTER SYMPTOMS
COUGH: 1
SORE THROAT: 1

## 2020-11-26 ASSESSMENT — FIBROSIS 4 INDEX: FIB4 SCORE: 1.7

## 2020-11-26 NOTE — PROGRESS NOTES
"Subjective:      Kendra Winters is a 65 y.o. female who presents with Sore Throat (x5 days ) and Cough      - This is a pleasant and nontoxic appearing 65 y.o. female with c/o ~5 days w/ some general aches/malaise, cough and sore throat. No NVFC/cp/sob. Worried she has strep. Nothing makes it better or worse.              ALLERGIES:  Latex     PMH:  Past Medical History:   Diagnosis Date   • Adverse effect of anesthesia     \"dizzy\" post op   • Anesthesia     slow to wake up   • Arthritis     knees   • Bronchitis 2013   • C. difficile colitis    • C. difficile diarrhea 5/2013    resolved since 1/2014   • Cancer (HCC) 2008    skin cancer   • Cataract     no sx   • Diverticulitis    • High cholesterol    • Hypertension    • Insomnia related to another mental disorder     stress   • Pain     right knee   • Pneumonia 2011   • PONV (postoperative nausea and vomiting)    • Psychiatric disturbance     stress   • ULCERATIVE COLITIS         PSH:  Past Surgical History:   Procedure Laterality Date   • PB TOTAL KNEE ARTHROPLASTY Left 5/31/2019    Procedure: ARTHROPLASTY, KNEE, TOTAL;  Surgeon: Marco Antonio Friend M.D.;  Location: [facility];  Service: Orthopedics   • KNEE ARTHROPLASTY TOTAL  9/26/2014    Performed by Marco Antonio Friend M.D. at [facility]   • FECAL TRANSPLANT  11/05/2013    x3   • WRIST ORIF  9/5/2013    Performed by Teto Hernandez M.D. at [facility]   • HEMORRHOIDECTOMY     • OTHER ORTHOPEDIC SURGERY      knee    • TONSILLECTOMY         MEDS:    Current Outpatient Medications:   •  azithromycin (ZITHROMAX) 250 MG Tab, Use as directed, Disp: 6 Tab, Rfl: 0  •  omeprazole (PRILOSEC) 20 MG delayed-release capsule, Take 1 Cap by mouth every day., Disp: 30 Cap, Rfl: 1  •  acetaminophen (TYLENOL) 500 MG Tab, Take 2 Tabs by mouth 3 times a day., Disp: , Rfl:   •  atorvastatin (LIPITOR) 20 MG Tab, Take 20 mg by mouth every evening., Disp: , Rfl:   •  lisinopril " "(PRINIVIL) 10 MG Tab, Take 10 mg by mouth every evening., Disp: , Rfl:   •  sertraline (ZOLOFT) 50 MG Tab, Take 50 mg by mouth every evening., Disp: , Rfl:   •  zolpidem (AMBIEN) 5 MG Tab, Take 2.5-5 mg by mouth at bedtime as needed for Sleep., Disp: , Rfl:     ** I have documented what I find to be significant in regards to past medical, social, family and surgical history  in my HPI or under PMH/PSH/FH review section, otherwise it is noncontributory **           HPI    Review of Systems   HENT: Positive for sore throat.    Respiratory: Positive for cough.    All other systems reviewed and are negative.         Objective:     /66   Pulse 64   Temp 36.3 °C (97.3 °F) (Temporal)   Resp 20   Ht 1.626 m (5' 4\")   Wt 57.6 kg (127 lb)   SpO2 100%   BMI 21.80 kg/m²      Physical Exam  Vitals signs and nursing note reviewed.   Constitutional:       General: She is not in acute distress.     Appearance: She is well-developed. She is not diaphoretic.   HENT:      Head: Normocephalic and atraumatic.      Mouth/Throat:      Mouth: Mucous membranes are moist.      Pharynx: Oropharynx is clear.   Eyes:      General: No scleral icterus.     Conjunctiva/sclera: Conjunctivae normal.   Cardiovascular:      Heart sounds: Normal heart sounds. No murmur.   Pulmonary:      Effort: Pulmonary effort is normal. No respiratory distress.      Breath sounds: Normal breath sounds.   Skin:     Coloration: Skin is not pale.      Findings: No rash.   Neurological:      Mental Status: She is alert.      Motor: No abnormal muscle tone.   Psychiatric:         Mood and Affect: Mood normal.         Behavior: Behavior normal.         Judgment: Judgment normal.                 Assessment/Plan:            1. RTI (respiratory tract infection)  POCT Influenza A/B    POCT Rapid Strep A    COVID/SARS COV-2 PCR    azithromycin (ZITHROMAX) 250 MG Tab    DISCONTINUED: azithromycin (ZITHROMAX) 250 MG Tab         - Dx & d/c instructions discussed w/ " patient and/or family members.   - rest/hydrate  - E.R. precautions discussed       Follow up with their PCP in 2-3 days strongly advised, ER if not improving or feeling/getting worse.    Patient left in stable condition

## 2020-11-27 ENCOUNTER — TELEPHONE (OUTPATIENT)
Dept: URGENT CARE | Facility: CLINIC | Age: 65
End: 2020-11-27

## 2020-11-27 LAB
SARS-COV-2 RNA RESP QL NAA+PROBE: DETECTED
SPECIMEN SOURCE: ABNORMAL

## 2020-11-28 NOTE — TELEPHONE ENCOUNTER
Pt called for COVID result, which was positive. Pt and all other household members are to isolate for 10 days, may return to regular activity after 10 day isolation AND 24 hours with no symptoms. Pt had no further questions. Advised to proceed to ER if symptoms worsen.

## 2021-03-03 ENCOUNTER — HOSPITAL ENCOUNTER (OUTPATIENT)
Facility: MEDICAL CENTER | Age: 66
End: 2021-03-03
Attending: NURSE PRACTITIONER
Payer: MEDICARE

## 2021-03-03 ENCOUNTER — OFFICE VISIT (OUTPATIENT)
Dept: URGENT CARE | Facility: CLINIC | Age: 66
End: 2021-03-03
Payer: MEDICARE

## 2021-03-03 VITALS
BODY MASS INDEX: 22.02 KG/M2 | OXYGEN SATURATION: 98 % | SYSTOLIC BLOOD PRESSURE: 100 MMHG | DIASTOLIC BLOOD PRESSURE: 62 MMHG | TEMPERATURE: 98.7 F | HEIGHT: 64 IN | RESPIRATION RATE: 16 BRPM | HEART RATE: 71 BPM | WEIGHT: 129 LBS

## 2021-03-03 DIAGNOSIS — R68.89 FLU-LIKE SYMPTOMS: ICD-10-CM

## 2021-03-03 DIAGNOSIS — Z23 NEED FOR VACCINATION: ICD-10-CM

## 2021-03-03 LAB
COVID ORDER STATUS COVID19: NORMAL
FLUAV+FLUBV AG SPEC QL IA: NEGATIVE
INT CON NEG: NORMAL
INT CON POS: NORMAL

## 2021-03-03 PROCEDURE — U0005 INFEC AGEN DETEC AMPLI PROBE: HCPCS

## 2021-03-03 PROCEDURE — 87804 INFLUENZA ASSAY W/OPTIC: CPT | Performed by: NURSE PRACTITIONER

## 2021-03-03 PROCEDURE — U0003 INFECTIOUS AGENT DETECTION BY NUCLEIC ACID (DNA OR RNA); SEVERE ACUTE RESPIRATORY SYNDROME CORONAVIRUS 2 (SARS-COV-2) (CORONAVIRUS DISEASE [COVID-19]), AMPLIFIED PROBE TECHNIQUE, MAKING USE OF HIGH THROUGHPUT TECHNOLOGIES AS DESCRIBED BY CMS-2020-01-R: HCPCS

## 2021-03-03 PROCEDURE — 99213 OFFICE O/P EST LOW 20 MIN: CPT | Performed by: NURSE PRACTITIONER

## 2021-03-03 ASSESSMENT — ENCOUNTER SYMPTOMS
MYALGIAS: 1
RHINORRHEA: 1
HEADACHES: 1
CHILLS: 1
FATIGUE: 1
COUGH: 1
SORE THROAT: 1

## 2021-03-03 ASSESSMENT — FIBROSIS 4 INDEX: FIB4 SCORE: 1.7

## 2021-03-03 NOTE — LETTER
March 3, 2021    To Whom It May Concern:         This is confirmation that Kendra Winters attended her scheduled appointment with ANG Long on 3/03/21.    Your employee was seen in our clinic today.  A concern for COVID-19 has been identified and testing is in progress.     We are asking you to excuse absences while following self-isolation protocol per Center for Disease Control (CDC) guidelines.  Your employee will be able to access test results through our electronic delivery system called [employer].     If the results of testing are negative, and once there has been no fever (temperature >100.4 F) for at least 24 hours without treatment, and no vomiting or diarrhea for at least 48 hours, then return to work is approved.    If the results of testing are positive then your employee will be contacted by the [facility] or [facility] for further instructions on duration of self-isolation and return to work protocol. In general, this will also follow the CDC guidelines with a minimum of 10 days from the onset of symptoms and without fever, vomiting, or diarrhea as above.     In general, repeat testing is not necessary and not offered through our [facility].     This is the only note that will be provided from [facility] for this visit.  Your employee will require an appointment with a primary care provider if FMLA or disability forms are required.    If you have any questions please do not hesitate to call me at the phone number listed below.    Sincerely,          Rhina Maloney, APRN  [phone]

## 2021-03-04 ENCOUNTER — TELEPHONE (OUTPATIENT)
Dept: URGENT CARE | Facility: CLINIC | Age: 66
End: 2021-03-04

## 2021-03-04 LAB
SARS-COV-2 RNA RESP QL NAA+PROBE: NOTDETECTED
SPECIMEN SOURCE: NORMAL

## 2021-03-04 NOTE — PROGRESS NOTES
"Subjective:      Kendra Winters is a 65 y.o. female who presents with Body Aches (all symptoms started 2 nights ago ), Headache, Sore Throat, Nasal Congestion, Fatigue, and Cough (sneezing )            Cough  This is a new problem. Episode onset: pt reports new onset of ST, aches, chills, headache, sinus congestion and coughing that started 2 days ago. Admits she had COVID November 2020. No recent sick contacts. The problem has been unchanged. The cough is non-productive. Associated symptoms include chills, headaches, myalgias, nasal congestion, rhinorrhea and a sore throat. She has tried rest (zicam, tylenol) for the symptoms. The treatment provided no relief. There is no history of asthma or pneumonia.       Review of Systems   Constitutional: Positive for chills and fatigue.   HENT: Positive for congestion, rhinorrhea and sore throat.    Respiratory: Positive for cough.    Musculoskeletal: Positive for myalgias.   Neurological: Positive for headaches.   All other systems reviewed and are negative.    Past Medical History:   Diagnosis Date   • Adverse effect of anesthesia     \"dizzy\" post op   • Anesthesia     slow to wake up   • Arthritis     knees   • Bronchitis 2013   • C. difficile colitis    • C. difficile diarrhea 5/2013    resolved since 1/2014   • Cancer (HCC) 2008    skin cancer   • Cataract     no sx   • Diverticulitis    • High cholesterol    • Hypertension    • Insomnia related to another mental disorder     stress   • Pain     right knee   • Pneumonia 2011   • PONV (postoperative nausea and vomiting)    • Psychiatric disturbance     stress   • ULCERATIVE COLITIS       Past Surgical History:   Procedure Laterality Date   • PB TOTAL KNEE ARTHROPLASTY Left 5/31/2019    Procedure: ARTHROPLASTY, KNEE, TOTAL;  Surgeon: Marco Antonio Friend M.D.;  Location: [facility];  Service: Orthopedics   • KNEE ARTHROPLASTY TOTAL  9/26/2014    Performed by Marco Antonio Friend M.D. at [facility]" "[place] [facility]   • FECAL TRANSPLANT  11/05/2013    x3   • WRIST ORIF  2013    Performed by Teto Hernandez M.D. at SURGERY [facility] ORS   • HEMORRHOIDECTOMY     • OTHER ORTHOPEDIC SURGERY      knee    • TONSILLECTOMY        Social History     Socioeconomic History   • Marital status:      Spouse name: Not on file   • Number of children: Not on file   • Years of education: Not on file   • Highest education level: Not on file   Occupational History   • Not on file   Tobacco Use   • Smoking status: Former Smoker     Packs/day: 0.50     Years: 20.00     Pack years: 10.00     Types: Cigarettes     Quit date: 2013     Years since quittin.0   • Smokeless tobacco: Never Used   • Tobacco comment: 2 cig /week   Substance and Sexual Activity   • Alcohol use: Yes     Comment: 2-3 per week   • Drug use: No   • Sexual activity: Not Currently   Other Topics Concern   • Not on file   Social History Narrative   • Not on file     Social Determinants of Health     Financial Resource Strain:    • Difficulty of Paying Living Expenses:    Food Insecurity:    • Worried About Running Out of Food in the Last Year:    • Ran Out of Food in the Last Year:    Transportation Needs:    • Lack of Transportation (Medical):    • Lack of Transportation (Non-Medical):    Physical Activity:    • Days of Exercise per Week:    • Minutes of Exercise per Session:    Stress:    • Feeling of Stress :    Social Connections:    • Frequency of Communication with Friends and Family:    • Frequency of Social Gatherings with Friends and Family:    • Attends Rastafari Services:    • Active Member of Clubs or Organizations:    • Attends Club or Organization Meetings:    • Marital Status:    Intimate Partner Violence:    • Fear of Current or Ex-Partner:    • Emotionally Abused:    • Physically Abused:    • Sexually Abused:           Objective:     /62   Pulse 71   Temp 37.1 °C (98.7 °F) (Temporal)   Resp 16   Ht 1.626 m (5' 4\")   Wt " 58.5 kg (129 lb)   SpO2 98%   BMI 22.14 kg/m²      Physical Exam  Vitals and nursing note reviewed.   Constitutional:       Appearance: Normal appearance. She is normal weight.   HENT:      Head: Normocephalic and atraumatic.      Nose: Congestion and rhinorrhea present.      Mouth/Throat:      Mouth: Mucous membranes are moist.      Pharynx: Oropharynx is clear.   Eyes:      Extraocular Movements: Extraocular movements intact.      Pupils: Pupils are equal, round, and reactive to light.   Cardiovascular:      Rate and Rhythm: Normal rate and regular rhythm.   Pulmonary:      Effort: Pulmonary effort is normal.   Musculoskeletal:         General: Normal range of motion.      Cervical back: Normal range of motion.   Skin:     General: Skin is warm and dry.      Capillary Refill: Capillary refill takes less than 2 seconds.   Neurological:      General: No focal deficit present.      Mental Status: She is alert and oriented to person, place, and time. Mental status is at baseline.   Psychiatric:         Mood and Affect: Mood normal.         Speech: Speech normal.         Thought Content: Thought content normal.         Judgment: Judgment normal.                 Assessment/Plan:        1. Flu-like symptoms  - POCT Influenza A/B NEGATIVE  - COVID/SARS CoV-2 PCR; Future    OTC meds to help with symptom relief  Tylenol and ibuprofen as needed for chills and aches  Encouraged rest and fluids  • Patient's vital signs are reassuring and they appear hemodynamically stable and do not require higher level care at this time  • I discussed self isolation and provided printed instructions (if applicable)  • I discussed ER precautions and provided printed instructions (if applicable)  • I educated the patient on possibility of a false-negative test  • I instructed the patient to try to follow up with their PCP (if applicable) for follow up care  • I provided the patient the printed AVS which contains information about signing up  for MyChart   • I will contact the patient via Zoom with Covid results.  • If requested, I provided the patient with a work note to provide to their employer or school regarding returning to work and discontinuation of self isolation.  • All questions were answered and patient demonstrated verbal understanding of above.  • I followed all reasonable PPE precautions during this encounter including but not limited to use of an N95 mask, gloves, and gown if indicated.    Supportive care, differential diagnoses, and indications for immediate follow-up discussed with patient.    Pathogenesis of diagnosis discussed including typical length and natural progression.      Instructed to return to  or nearest emergency department if symptoms fail to improve, for any change in condition, further concerns, or new concerning symptoms.  Patient states understanding of the plan of care and discharge instructions.

## 2021-03-04 NOTE — TELEPHONE ENCOUNTER
Patient called asking about COVID test results. Unable to access [facility]. Relayed negative test results, and patient understood.

## 2021-10-31 NOTE — CARE PLAN
Problem: Safety  Goal: Will remain free from injury  Outcome: PROGRESSING AS EXPECTED      Problem: Knowledge Deficit  Goal: Knowledge of disease process/condition, treatment plan, diagnostic tests, and medications will improve  Outcome: PROGRESSING AS EXPECTED        
Problem: Safety  Goal: Will remain free from injury  Outcome: PROGRESSING AS EXPECTED    Intervention: Provide assistance with mobility   06/14/19 0339   Mobility   Assistance No Assistance Required   Ambulation Tolerance Tolerates Well         Problem: Pain Management  Goal: Pain level will decrease to patient's comfort goal  Outcome: PROGRESSING AS EXPECTED   06/14/19 0339   OTHER   Non Verbal Scale  Calm;Sleeping;Unlabored Breathing         
Strong peripheral pulses

## 2022-05-12 ENCOUNTER — HOSPITAL ENCOUNTER (OUTPATIENT)
Dept: RADIOLOGY | Facility: MEDICAL CENTER | Age: 67
End: 2022-05-12
Attending: FAMILY MEDICINE
Payer: MEDICARE

## 2022-05-12 DIAGNOSIS — M79.675 PAIN IN TOE OF LEFT FOOT: ICD-10-CM

## 2022-05-12 PROCEDURE — 73630 X-RAY EXAM OF FOOT: CPT | Mod: LT

## 2022-07-24 ENCOUNTER — HOSPITAL ENCOUNTER (EMERGENCY)
Facility: MEDICAL CENTER | Age: 67
End: 2022-07-24
Attending: EMERGENCY MEDICINE
Payer: MEDICARE

## 2022-07-24 VITALS
HEART RATE: 71 BPM | RESPIRATION RATE: 16 BRPM | HEIGHT: 64 IN | DIASTOLIC BLOOD PRESSURE: 73 MMHG | BODY MASS INDEX: 23.64 KG/M2 | WEIGHT: 138.45 LBS | OXYGEN SATURATION: 99 % | TEMPERATURE: 98.2 F | SYSTOLIC BLOOD PRESSURE: 132 MMHG
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
## 2022-07-24 DIAGNOSIS — S61.452A DOG BITE OF LEFT HAND, INITIAL ENCOUNTER: Primary | ICD-10-CM

## 2022-07-24 PROCEDURE — 700111 HCHG RX REV CODE 636 W/ 250 OVERRIDE (IP): Performed by: EMERGENCY MEDICINE

## 2022-07-24 PROCEDURE — 90471 IMMUNIZATION ADMIN: CPT

## 2022-07-24 PROCEDURE — 90715 TDAP VACCINE 7 YRS/> IM: CPT | Performed by: EMERGENCY MEDICINE

## 2022-07-24 PROCEDURE — 99282 EMERGENCY DEPT VISIT SF MDM: CPT | Mod: 25

## 2022-07-24 RX ORDER — AMOXICILLIN AND CLAVULANATE POTASSIUM 875; 125 MG/1; MG/1
1 TABLET, FILM COATED ORAL 2 TIMES DAILY
Qty: 14 TABLET | Refills: 0 | Status: SHIPPED | OUTPATIENT
Start: 2022-07-24 | End: 2022-07-31

## 2022-07-24 RX ADMIN — CLOSTRIDIUM TETANI TOXOID ANTIGEN (FORMALDEHYDE INACTIVATED), CORYNEBACTERIUM DIPHTHERIAE TOXOID ANTIGEN (FORMALDEHYDE INACTIVATED), BORDETELLA PERTUSSIS TOXOID ANTIGEN (GLUTARALDEHYDE INACTIVATED), BORDETELLA PERTUSSIS FILAMENTOUS HEMAGGLUTININ ANTIGEN (FORMALDEHYDE INACTIVATED), BORDETELLA PERTUSSIS PERTACTIN ANTIGEN, AND BORDETELLA PERTUSSIS FIMBRIAE 2/3 ANTIGEN 0.5 ML: 5; 2; 2.5; 5; 3; 5 INJECTION, SUSPENSION INTRAMUSCULAR at 18:56

## 2022-07-25 NOTE — ED PROVIDER NOTES
"ED Provider Note   2022  6:43 PM    Means of Arrival: Walk In  History obtained by: patient  Limitations: None    CHIEF COMPLAINT  Chief Complaint   Patient presents with   • Dog Bite     Bit by a dog that a friend was dog sitting 45 min ago  Small scratch LT d thumb  Dogs shots current       HPI  Kendra Tracey Winters is a 66 y.o. female with history of C. difficile, hypertension, hyperlipidemia who presents after her left thumb sustained an abrasion from a \"dog bite.\"  This occurred just prior to arrival.  The dog is known and the dog's vaccines are up-to-date.  Minimal pain.  No sensory or motor problems of the left thumb.    REVIEW OF SYSTEMS  Review of Systems   All other systems reviewed and are negative.    See HPI for further details.     PAST MEDICAL HISTORY   has a past medical history of Adverse effect of anesthesia, Anesthesia, Arthritis, Bronchitis (), C. difficile colitis, C. difficile diarrhea (2013), Cancer (HCC) (), Cataract, Diverticulitis, High cholesterol, Hypertension, Insomnia related to another mental disorder, Pain, Pneumonia (), PONV (postoperative nausea and vomiting), Psychiatric disturbance, and ULCERATIVE COLITIS.    FAMILY HISTORY  Family History   Problem Relation Age of Onset   • Cancer Mother 48        breast   • Cancer Maternal Grandmother    • Hypertension Father    • Heart Disease Father        SOCIAL HISTORY  Social History     Tobacco Use   • Smoking status: Former Smoker     Packs/day: 0.50     Years: 20.00     Pack years: 10.00     Types: Cigarettes     Quit date: 2013     Years since quittin.4   • Smokeless tobacco: Never Used   • Tobacco comment: 2 cig /week   Substance and Sexual Activity   • Alcohol use: Yes     Comment: 2-3 per week   • Drug use: No   • Sexual activity: Not Currently       SURGICAL HISTORY   has a past surgical history that includes other orthopedic surgery; tonsillectomy; orif, wrist (2013); fecal transplant " "(11/05/2013); knee arthroplasty total (9/26/2014); hemorrhoidectomy; and total knee arthroplasty (Left, 5/31/2019).    CURRENT MEDICATIONS  Home Medications    **Home medications have not yet been reviewed for this encounter**         ALLERGIES  Allergies   Allergen Reactions   • Latex Anaphylaxis, Shortness of Breath and Swelling       PHYSICAL EXAM  VITAL SIGNS: /73   Pulse 71   Temp 36.8 °C (98.2 °F) (Temporal)   Resp 16   Ht 1.626 m (5' 4\")   Wt 62.8 kg (138 lb 7.2 oz)   SpO2 99%   BMI 23.76 kg/m²    Pulse ox interpretation: I interpret this pulse ox as normal.  Constitutional: Alert in no apparent distress.  HENT: Normocephalic, Atraumatic,  Heart: Regular rate   Lungs: Unlabored breathing  Skin: Warm, Dry, see MSK exam.  Neurologic: Alert, Grossly non-focal. No slurred speech. Moving extremities normally.   MSK: There is a 1 cm partial-thickness abrasion at the base of the left thumb.  No signs of movement deficit suggesting a tendon injury.  Sensation intact.  Good strength.  Psychiatric: Affect normal, Judgment normal, Mood normal, Appears appropriate and not intoxicated.   Physical Exam      COURSE & MEDICAL DECISION MAKING  Pertinent Labs & Imaging studies reviewed. (See chart for details)    6:43 PM This is an emergent evaluation of a 66 y.o., female who presents with abrasion at left thumb from a dog bite.  This is superficial partial-thickness.  Nothing to approximate.  Unknown last tetanus vaccine.  She will receive tetanus booster today.  We discussed antibiotics for the wound.  She is very hesitant to take antibiotics because of history of C. difficile.  Standard of care would be to prescribe Augmentin.  Given this is an extremely superficial wound, we will apply topical antibiotic.  She has a prescription for Augmentin and if any redness starts to form around the wound she will need to start Augmentin.  We discussed aggressive probiotic use while taking antibiotics.  She was agreeable " with this plan.     The patient will return for worsening symptoms and is stable at the time of discharge. The patient verbalizes understanding. Guidance was provided on appropriate use of medications including driving under the influence, overdose, and side effects.     FINAL IMPRESSION  1. Dog bite of left hand, initial encounter Active          This dictation was created using voice recognition software. The accuracy of the dictation is limited to the abilities of the software. I expect there may be some errors of grammar and possibly content. The nursing notes were reviewed and certain aspects of this information were incorporated into this note.    Electronically signed by: Wilber Quick II, M.D., 7/24/2022 6:43 PM

## 2022-07-25 NOTE — DISCHARGE INSTRUCTIONS
If any redness starts showing around the wound you must start taking the oral antibiotics. Take probiotics/yogurt/kombucha with antibiotics if you start them.

## 2022-08-06 ENCOUNTER — HOSPITAL ENCOUNTER (EMERGENCY)
Facility: MEDICAL CENTER | Age: 67
End: 2022-08-06
Attending: EMERGENCY MEDICINE
Payer: MEDICARE

## 2022-08-06 VITALS
WEIGHT: 138.89 LBS | SYSTOLIC BLOOD PRESSURE: 105 MMHG | HEART RATE: 62 BPM | HEIGHT: 64 IN | DIASTOLIC BLOOD PRESSURE: 62 MMHG | TEMPERATURE: 97.1 F | BODY MASS INDEX: 23.71 KG/M2 | OXYGEN SATURATION: 96 % | RESPIRATION RATE: 18 BRPM

## 2022-08-06 DIAGNOSIS — G89.18 POSTOPERATIVE PAIN: ICD-10-CM

## 2022-08-06 PROCEDURE — 96372 THER/PROPH/DIAG INJ SC/IM: CPT

## 2022-08-06 PROCEDURE — 700111 HCHG RX REV CODE 636 W/ 250 OVERRIDE (IP): Performed by: EMERGENCY MEDICINE

## 2022-08-06 PROCEDURE — 99283 EMERGENCY DEPT VISIT LOW MDM: CPT

## 2022-08-06 RX ORDER — ONDANSETRON 4 MG/1
4 TABLET, ORALLY DISINTEGRATING ORAL ONCE
Status: COMPLETED | OUTPATIENT
Start: 2022-08-06 | End: 2022-08-06

## 2022-08-06 RX ORDER — HYDROMORPHONE HYDROCHLORIDE 1 MG/ML
1 INJECTION, SOLUTION INTRAMUSCULAR; INTRAVENOUS; SUBCUTANEOUS ONCE
Status: COMPLETED | OUTPATIENT
Start: 2022-08-06 | End: 2022-08-06

## 2022-08-06 RX ADMIN — ONDANSETRON 4 MG: 4 TABLET, ORALLY DISINTEGRATING ORAL at 10:59

## 2022-08-06 RX ADMIN — HYDROMORPHONE HYDROCHLORIDE 1 MG: 1 INJECTION, SOLUTION INTRAMUSCULAR; INTRAVENOUS; SUBCUTANEOUS at 10:59

## 2022-08-06 NOTE — ED NOTES
Patient verbalized understanding to plan of care and discharge information. Patient reports pain 7/10. Patient in stable condition. Patient wheeled out of ED to person vehicle with family.

## 2022-08-06 NOTE — ED TRIAGE NOTES
"Pt is 2 day post op/surgery on her left foot.  She presents complaining of severe escalation of pain poorly controlled with her current oxycodone prescription.  Chief Complaint   Patient presents with   • Post-Op Pain   • Foot Pain     /62   Pulse 62   Temp 36.2 °C (97.1 °F) (Temporal)   Resp 18   Ht 1.626 m (5' 4\")   Wt 63 kg (138 lb 14.2 oz)   SpO2 96%   BMI 23.84 kg/m²   Has this patient been vaccinated for COVID YES  If not, would they like to be vaccinated while in the ER if eligible?  N/A  Would the patient like to speak with the ERP about the possibility of receiving the COVID vaccine today before making a decision? N/A     "

## 2022-08-06 NOTE — ED PROVIDER NOTES
"ED Provider Note    CHIEF COMPLAINT  Chief Complaint   Patient presents with   • Post-Op Pain   • Foot Pain       HPI  Kendra Tracey Winters is a 66 y.o. female here for evaluation of postop foot pain.  Patient states that she had a surgery on Thursday, at [facility].  The patient states that she was doing fine until the \"nerve block\" wore off, and at that point she started having creasing pain as of yesterday.  She took 2 oxycodones without much relief, so she called the office and the on-call doctor recommended that she come in for a \"pain shot.\"  Patient has no fever chills, no vomiting, and no fall.  She states that she has had her boot placed, and she has a follow-up appointment on Monday with Dr. Blackwood.    ROS;  Please see HPI  O/W negative     PAST MEDICAL HISTORY   has a past medical history of Adverse effect of anesthesia, Anesthesia, Arthritis, Bronchitis (), C. difficile colitis, C. difficile diarrhea (2013), Cancer ([facility]) (), Cataract, Diverticulitis, High cholesterol, Hypertension, Insomnia related to another mental disorder, Pain, Pneumonia (), PONV (postoperative nausea and vomiting), Psychiatric disturbance, and ULCERATIVE COLITIS.    SOCIAL HISTORY  Social History     Tobacco Use   • Smoking status: Former Smoker     Packs/day: 0.50     Years: 20.00     Pack years: 10.00     Types: Cigarettes     Quit date: 2013     Years since quittin.4   • Smokeless tobacco: Never Used   • Tobacco comment: 2 cig /week   Substance and Sexual Activity   • Alcohol use: Yes     Comment: 2-3 per week   • Drug use: No   • Sexual activity: Not Currently       SURGICAL HISTORY   has a past surgical history that includes other orthopedic surgery; tonsillectomy; orif, wrist (2013); fecal transplant (2013); knee arthroplasty total (2014); hemorrhoidectomy; and total knee arthroplasty (Left, 2019).    CURRENT MEDICATIONS  Home Medications    **Home medications have not " "yet been reviewed for this encounter**         ALLERGIES  Allergies   Allergen Reactions   • Latex Anaphylaxis, Shortness of Breath and Swelling       REVIEW OF SYSTEMS  See HPI for further details. Review of systems as above, otherwise all other systems are negative.     PHYSICAL EXAM  VITAL SIGNS: /62   Pulse 62   Temp 36.2 °C (97.1 °F) (Temporal)   Resp 18   Ht 1.626 m (5' 4\")   Wt 63 kg (138 lb 14.2 oz)   SpO2 96%   BMI 23.84 kg/m²     Constitutional: Well developed, well nourished. No acute distress.  HEENT: Normocephalic, atraumatic. MMM  Neck: Supple, Full range of motion   Chest/Pulmonary:  No respiratory distress.  Equal expansion   Musculoskeletal: No deformity, no edema, neurovascular intact. Left lower ext:  Boot and wrap in place. N/v intact distally, good cap refill, non tender calf.   Neuro: Clear speech, appropriate, cooperative, cranial nerves II-XII grossly intact.  Psych: Normal mood and affect      PROCEDURES     MEDICAL RECORD  I have reviewed patient's medical record and pertinent results are listed.    COURSE & MEDICAL DECISION MAKING  I have reviewed any medical record information, laboratory studies and radiographic results as noted above.    I you have had any blood pressure issues while here in the emergency department, please see your doctor for a further evaluation or work up.    The pt was given a IM injection of Dilaudid, and a zofran. She has improved regarding her pain.  She has no fever/chills.  She has oxy at home for pain control, and she sees Dr. Blackwood on Monday.     Differential diagnoses include but not limited to: post op pain, cellulitis,    This patient presents with post operative pain .  At this time, I have counseled the patient/family regarding their medications, pain control, and follow up.  They will continue their medications, if any, as prescribed.  They will return immediately for any worsening symptoms and/or any other medical concerns.  They will see " their doctor, or contact the doctor provided, in 1-2 days for follow up.       FINAL IMPRESSION  Post operative pain      Electronically signed by: Ricky Bianchi D.O., 8/6/2022 10:42 AM

## 2022-08-16 ENCOUNTER — HOSPITAL ENCOUNTER (EMERGENCY)
Facility: MEDICAL CENTER | Age: 67
End: 2022-08-16
Attending: EMERGENCY MEDICINE
Payer: MEDICARE

## 2022-08-16 VITALS
WEIGHT: 128 LBS | DIASTOLIC BLOOD PRESSURE: 79 MMHG | SYSTOLIC BLOOD PRESSURE: 156 MMHG | OXYGEN SATURATION: 98 % | BODY MASS INDEX: 21.85 KG/M2 | RESPIRATION RATE: 18 BRPM | HEIGHT: 64 IN | HEART RATE: 85 BPM | TEMPERATURE: 97.8 F

## 2022-08-16 DIAGNOSIS — R19.7 DIARRHEA, UNSPECIFIED TYPE: ICD-10-CM

## 2022-08-16 LAB
ALBUMIN SERPL BCP-MCNC: 4.4 G/DL (ref 3.2–4.9)
ALBUMIN/GLOB SERPL: 1.3 G/DL
ALP SERPL-CCNC: 104 U/L (ref 30–99)
ALT SERPL-CCNC: 13 U/L (ref 2–50)
ANION GAP SERPL CALC-SCNC: 13 MMOL/L (ref 7–16)
AST SERPL-CCNC: 17 U/L (ref 12–45)
BASOPHILS # BLD AUTO: 0.5 % (ref 0–1.8)
BASOPHILS # BLD: 0.05 K/UL (ref 0–0.12)
BILIRUB SERPL-MCNC: 0.5 MG/DL (ref 0.1–1.5)
BUN SERPL-MCNC: 9 MG/DL (ref 8–22)
CALCIUM SERPL-MCNC: 9.9 MG/DL (ref 8.4–10.2)
CHLORIDE SERPL-SCNC: 101 MMOL/L (ref 96–112)
CO2 SERPL-SCNC: 19 MMOL/L (ref 20–33)
CREAT SERPL-MCNC: 0.6 MG/DL (ref 0.5–1.4)
EOSINOPHIL # BLD AUTO: 0.07 K/UL (ref 0–0.51)
EOSINOPHIL NFR BLD: 0.7 % (ref 0–6.9)
ERYTHROCYTE [DISTWIDTH] IN BLOOD BY AUTOMATED COUNT: 42.5 FL (ref 35.9–50)
GFR SERPLBLD CREATININE-BSD FMLA CKD-EPI: 98 ML/MIN/1.73 M 2
GLOBULIN SER CALC-MCNC: 3.3 G/DL (ref 1.9–3.5)
GLUCOSE SERPL-MCNC: 94 MG/DL (ref 65–99)
HCT VFR BLD AUTO: 45.7 % (ref 37–47)
HGB BLD-MCNC: 14.9 G/DL (ref 12–16)
IMM GRANULOCYTES # BLD AUTO: 0.03 K/UL (ref 0–0.11)
IMM GRANULOCYTES NFR BLD AUTO: 0.3 % (ref 0–0.9)
LYMPHOCYTES # BLD AUTO: 1.47 K/UL (ref 1–4.8)
LYMPHOCYTES NFR BLD: 14 % (ref 22–41)
MCH RBC QN AUTO: 29.5 PG (ref 27–33)
MCHC RBC AUTO-ENTMCNC: 32.6 G/DL (ref 33.6–35)
MCV RBC AUTO: 90.5 FL (ref 81.4–97.8)
MONOCYTES # BLD AUTO: 0.43 K/UL (ref 0–0.85)
MONOCYTES NFR BLD AUTO: 4.1 % (ref 0–13.4)
NEUTROPHILS # BLD AUTO: 8.48 K/UL (ref 2–7.15)
NEUTROPHILS NFR BLD: 80.4 % (ref 44–72)
NRBC # BLD AUTO: 0 K/UL
NRBC BLD-RTO: 0 /100 WBC
PLATELET # BLD AUTO: 308 K/UL (ref 164–446)
PMV BLD AUTO: 8.9 FL (ref 9–12.9)
POTASSIUM SERPL-SCNC: 3.9 MMOL/L (ref 3.6–5.5)
PROT SERPL-MCNC: 7.7 G/DL (ref 6–8.2)
RBC # BLD AUTO: 5.05 M/UL (ref 4.2–5.4)
SODIUM SERPL-SCNC: 133 MMOL/L (ref 135–145)
WBC # BLD AUTO: 10.5 K/UL (ref 4.8–10.8)

## 2022-08-16 PROCEDURE — 80053 COMPREHEN METABOLIC PANEL: CPT

## 2022-08-16 PROCEDURE — 87899 AGENT NOS ASSAY W/OPTIC: CPT

## 2022-08-16 PROCEDURE — 85025 COMPLETE CBC W/AUTO DIFF WBC: CPT

## 2022-08-16 PROCEDURE — 87329 GIARDIA AG IA: CPT

## 2022-08-16 PROCEDURE — 36415 COLL VENOUS BLD VENIPUNCTURE: CPT

## 2022-08-16 PROCEDURE — 87045 FECES CULTURE AEROBIC BACT: CPT

## 2022-08-16 PROCEDURE — 87493 C DIFF AMPLIFIED PROBE: CPT

## 2022-08-16 PROCEDURE — 99284 EMERGENCY DEPT VISIT MOD MDM: CPT

## 2022-08-16 PROCEDURE — 87324 CLOSTRIDIUM AG IA: CPT | Mod: XU

## 2022-08-16 PROCEDURE — 87328 CRYPTOSPORIDIUM AG IA: CPT

## 2022-08-17 LAB
G LAMBLIA+C PARVUM AG STL QL RAPID: NORMAL
SIGNIFICANT IND 70042: NORMAL
SITE SITE: NORMAL
SOURCE SOURCE: NORMAL

## 2022-08-17 NOTE — DISCHARGE INSTRUCTIONS
[facility]  Please return for fever, abdominal pain, bleeding or any concerns.  Please follow-up with your primary doctor for recheck

## 2022-08-17 NOTE — ED NOTES
DC instructions reviewed. Verbalizes understanding and denies further need. Taken to the exit via WC.

## 2022-08-17 NOTE — ED TRIAGE NOTES
Patient presents with complaints of 20+ liquid stools/day for the past few days. Is on Augmentin for after having foot surgery 8/4.

## 2022-08-17 NOTE — ED NOTES
Pt to ED today for evaluation of Diarrhea for the past 2-3 days with previous hx of C-Diff for 2 years. Pt was prescribed Augmentin for a foot infection and did request not to be put on Augmentin due to Augmentin causing C-diff in the past. Diarrhea started after the 4th day of taking the augmentin. Pt unsure if diarrhea has the characteristics of previous C-diff infections.

## 2022-08-17 NOTE — ED NOTES
Iv completed in Left AC, [place] pulled. Patient  Comfortable and call light in reach. No further needs at this time.

## 2022-08-17 NOTE — ED PROVIDER NOTES
"ED Provider Note    CHIEF COMPLAINT  Chief Complaint   Patient presents with    Diarrhea     20+ liquid stools/day for the past few days. Has had C-diff in the past and feels this is similar       HPI  Kendra Webb Jg Winters is a 66 y.o. female who presents with complaint of diarrhea for 4 days.  Onset while taking Augmentin.  She is taking the Augmentin for postoperative foot infection, no new drainage to the foot.  No fever or chills.  She denies abdominal pain.  She stated she had approximately 20 liquid bowel movements today, was able to provide a stool sample prior to me seeing the patient.  No dysuria.  No chest pain.  No dizziness.  Previously treated for C. 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  She states her gastroenterologist has subsequently retired and moved out of town.  Patient presents with normal vital signs.  Occasional streak of blood in her stool, no large amounts.    REVIEW OF SYSTEMS  Constitutional: No fever or dizziness  Respiratory: No shortness of breath  Cardiac: No chest pain or syncope  Gastrointestinal: Diarrhea, no abdominal pain.  No vomiting  Musculoskeletal: No acute neck or back pain.  Recent toe surgery  Neurologic: No headache  Genitourinary: No dysuria  Skin: Healing wound right foot, no new redness     All other systems are negative.     PAST MEDICAL HISTORY  Past Medical History:   Diagnosis Date    Adverse effect of anesthesia     \"dizzy\" post op    Anesthesia     slow to wake up    Arthritis     knees    Bronchitis 2013    C. difficile colitis     C. difficile diarrhea 5/2013    resolved since 1/2014    Cancer (HCC) 2008    skin cancer    Cataract     no sx    Diverticulitis     High cholesterol     Hypertension     Insomnia related to another mental disorder     stress    Pain     right knee    Pneumonia 2011    PONV (postoperative nausea and vomiting)     Psychiatric disturbance     stress    ULCERATIVE COLITIS        FAMILY HISTORY  Family History " "  Problem Relation Age of Onset    Cancer Mother 48        breast    Cancer Maternal Grandmother     Hypertension Father     Heart Disease Father        SOCIAL HISTORY  Social History     Socioeconomic History    Marital status:    Tobacco Use    Smoking status: Former     Packs/day: 0.50     Years: 20.00     Pack years: 10.00     Types: Cigarettes     Quit date: 2013     Years since quittin.4    Smokeless tobacco: Never    Tobacco comments:     2 cig /week   Substance and Sexual Activity    Alcohol use: Yes     Comment: 2-3 per week    Drug use: No    Sexual activity: Not Currently       SURGICAL HISTORY  Past Surgical History:   Procedure Laterality Date    PB TOTAL KNEE ARTHROPLASTY Left 2019    Procedure: ARTHROPLASTY, KNEE, TOTAL;  Surgeon: Marco Antonio Friend M.D.;  Location: SURGERY [facility];  Service: Orthopedics    KNEE ARTHROPLASTY TOTAL  2014    Performed by Marco Antonio Friend M.D. at SURGERY [facility]    FECAL TRANSPLANT  11/05/2013    x3    ORIF, WRIST  2013    Performed by Teto Hernandez M.D. at SURGERY [facility]    HEMORRHOIDECTOMY      OTHER ORTHOPEDIC SURGERY      knee     TONSILLECTOMY         CURRENT MEDICATIONS  Home Medications       Reviewed by Kaye Minaya R.N. (Registered Nurse) on 22 at 1940  Med List Status: Not Addressed     Medication Last Dose Status   acetaminophen (TYLENOL) 500 MG Tab  Active   atorvastatin (LIPITOR) 20 MG Tab  Active   azithromycin (ZITHROMAX) 250 MG Tab  Active   lisinopril (PRINIVIL) 10 MG Tab  Active   omeprazole (PRILOSEC) 20 MG delayed-release capsule  Active   sertraline (ZOLOFT) 50 MG Tab  Active   zolpidem (AMBIEN) 5 MG Tab  Active                    ALLERGIES  Allergies   Allergen Reactions    Latex Anaphylaxis, Shortness of Breath and Swelling       PHYSICAL EXAM  VITAL SIGNS: /75   Pulse 92   Temp 36.7 °C (98 °F) (Temporal)   Resp 18   Ht 1.626 m (5' 4\")   Wt 58.1 kg (128 lb)   SpO2 98%   BMI " 21.97 kg/m²   Constitutional: Well-nourished, no distress  ENT: Nares clear, mucous membranes moist.  Eyes:  Conjunctiva normal, No discharge.    Lymphatic: No adenopathy.   Cardiovascular: Normal heart rate, Normal rhythm.   Pulmonary: No wheezing, no rales  Gastrointestinal: Abdomen soft, nontender, no guarding.  Bowel sounds are normal  Skin: Warm, Dry, No jaundice.  Wound on right foot shows incision clean dry and intact, no incisional infection at this time  Musculoskeletal:  No CVA tenderness.   Neurologic:  Normal motor and sensory function, No focal deficits noted.   Psychiatric:Normal affect, Normal mood.    RADIOLOGY/PROCEDURES/Labs  Results for orders placed or performed during the hospital encounter of 08/16/22   CBC WITH DIFFERENTIAL   Result Value Ref Range    WBC 10.5 4.8 - 10.8 K/uL    RBC 5.05 4.20 - 5.40 M/uL    Hemoglobin 14.9 12.0 - 16.0 g/dL    Hematocrit 45.7 37.0 - 47.0 %    MCV 90.5 81.4 - 97.8 fL    MCH 29.5 27.0 - 33.0 pg    MCHC 32.6 (L) 33.6 - 35.0 g/dL    RDW 42.5 35.9 - 50.0 fL    Platelet Count 308 164 - 446 K/uL    MPV 8.9 (L) 9.0 - 12.9 fL    Neutrophils-Polys 80.40 (H) 44.00 - 72.00 %    Lymphocytes 14.00 (L) 22.00 - 41.00 %    Monocytes 4.10 0.00 - 13.40 %    Eosinophils 0.70 0.00 - 6.90 %    Basophils 0.50 0.00 - 1.80 %    Immature Granulocytes 0.30 0.00 - 0.90 %    Nucleated RBC 0.00 /100 WBC    Neutrophils (Absolute) 8.48 (H) 2.00 - 7.15 K/uL    Lymphs (Absolute) 1.47 1.00 - 4.80 K/uL    Monos (Absolute) 0.43 0.00 - 0.85 K/uL    Eos (Absolute) 0.07 0.00 - 0.51 K/uL    Baso (Absolute) 0.05 0.00 - 0.12 K/uL    Immature Granulocytes (abs) 0.03 0.00 - 0.11 K/uL    NRBC (Absolute) 0.00 K/uL   COMP METABOLIC PANEL   Result Value Ref Range    Sodium 133 (L) 135 - 145 mmol/L    Potassium 3.9 3.6 - 5.5 mmol/L    Chloride 101 96 - 112 mmol/L    Co2 19 (L) 20 - 33 mmol/L    Anion Gap 13.0 7.0 - 16.0    Glucose 94 65 - 99 mg/dL    Bun 9 8 - 22 mg/dL    Creatinine 0.60 0.50 - 1.40 mg/dL     Calcium 9.9 8.4 - 10.2 mg/dL    AST(SGOT) 17 12 - 45 U/L    ALT(SGPT) 13 2 - 50 U/L    Alkaline Phosphatase 104 (H) 30 - 99 U/L    Total Bilirubin 0.5 0.1 - 1.5 mg/dL    Albumin 4.4 3.2 - 4.9 g/dL    Total Protein 7.7 6.0 - 8.2 g/dL    Globulin 3.3 1.9 - 3.5 g/dL    A-G Ratio 1.3 g/dL   ESTIMATED GFR   Result Value Ref Range    GFR (CKD-EPI) 98 >60 mL/min/1.73 m 2         COURSE & MEDICAL DECISION MAKING  Pertinent Labs & Imaging studies reviewed. (See chart for details)  With recent antibiotic exposure, stool samples obtained for culture, C. difficile testing, O&P.  Patient's abdomen is benign, palpation has no tenderness.  Diverticulitis or severe colitis is unlikely.  Her white blood cell count is normal.  Her electrolyte balance stable, slight hyponatremia with sodium of 133.  Patient is tolerating oral intake and well-appearing, stable for discharge.  She is advised to obtain [employer] application to check results of her testing, advised it may take up to 2 days for her stool testing to return.  She is to follow-up with her primary care doctor for recheck as soon as possible.  Patient advised to return for pain, fever, bleeding or any concerns    FINAL IMPRESSION  1. Diarrhea, unspecified type                Electronically signed by: Hernando Ramirez M.D., 8/16/2022 9:06 PM

## 2022-08-18 ENCOUNTER — TELEPHONE (OUTPATIENT)
Dept: PHARMACY | Facility: MEDICAL CENTER | Age: 67
End: 2022-08-18
Payer: MEDICARE

## 2022-08-18 LAB
C DIFF DNA SPEC QL NAA+PROBE: NEGATIVE
C DIFF TOX A+B STL QL IA: POSITIVE
C DIFF TOX GENS STL QL NAA+PROBE: NORMAL
E COLI SXT1+2 STL IA: NORMAL
SIGNIFICANT IND 70042: NORMAL
SITE SITE: NORMAL
SOURCE SOURCE: NORMAL

## 2022-08-18 RX ORDER — VANCOMYCIN HYDROCHLORIDE 125 MG/1
125 CAPSULE ORAL 4 TIMES DAILY
Qty: 40 CAPSULE | Refills: 0 | Status: SHIPPED | OUTPATIENT
Start: 2022-08-18 | End: 2022-08-28

## 2022-08-19 LAB
BACTERIA STL CULT: NORMAL
C JEJUNI+C COLI AG STL QL: NORMAL
E COLI SXT1+2 STL IA: NORMAL
SIGNIFICANT IND 70042: NORMAL
SITE SITE: NORMAL
SOURCE SOURCE: NORMAL

## 2022-10-12 ENCOUNTER — HOSPITAL ENCOUNTER (EMERGENCY)
Facility: MEDICAL CENTER | Age: 67
End: 2022-10-12
Attending: EMERGENCY MEDICINE
Payer: MEDICARE

## 2022-10-12 VITALS
DIASTOLIC BLOOD PRESSURE: 77 MMHG | HEART RATE: 77 BPM | OXYGEN SATURATION: 94 % | TEMPERATURE: 99.4 F | HEIGHT: 64 IN | BODY MASS INDEX: 21.83 KG/M2 | SYSTOLIC BLOOD PRESSURE: 151 MMHG | WEIGHT: 127.87 LBS | RESPIRATION RATE: 18 BRPM

## 2022-10-12 DIAGNOSIS — R11.10 VOMITING AND DIARRHEA: ICD-10-CM

## 2022-10-12 DIAGNOSIS — A04.72 CLOSTRIDIUM DIFFICILE ENTEROCOLITIS: ICD-10-CM

## 2022-10-12 DIAGNOSIS — R19.7 VOMITING AND DIARRHEA: ICD-10-CM

## 2022-10-12 LAB
ALBUMIN SERPL BCP-MCNC: 3.8 G/DL (ref 3.2–4.9)
ALBUMIN/GLOB SERPL: 1.2 G/DL
ALP SERPL-CCNC: 81 U/L (ref 30–99)
ALT SERPL-CCNC: 11 U/L (ref 2–50)
ANION GAP SERPL CALC-SCNC: 15 MMOL/L (ref 7–16)
AST SERPL-CCNC: 17 U/L (ref 12–45)
BASOPHILS # BLD AUTO: 0.9 % (ref 0–1.8)
BASOPHILS # BLD: 0.06 K/UL (ref 0–0.12)
BILIRUB SERPL-MCNC: 0.7 MG/DL (ref 0.1–1.5)
BUN SERPL-MCNC: 9 MG/DL (ref 8–22)
CALCIUM SERPL-MCNC: 9.3 MG/DL (ref 8.4–10.2)
CHLORIDE SERPL-SCNC: 98 MMOL/L (ref 96–112)
CO2 SERPL-SCNC: 21 MMOL/L (ref 20–33)
CREAT SERPL-MCNC: 0.55 MG/DL (ref 0.5–1.4)
EOSINOPHIL # BLD AUTO: 0.06 K/UL (ref 0–0.51)
EOSINOPHIL NFR BLD: 0.9 % (ref 0–6.9)
ERYTHROCYTE [DISTWIDTH] IN BLOOD BY AUTOMATED COUNT: 42.6 FL (ref 35.9–50)
GFR SERPLBLD CREATININE-BSD FMLA CKD-EPI: 100 ML/MIN/1.73 M 2
GLOBULIN SER CALC-MCNC: 3.2 G/DL (ref 1.9–3.5)
GLUCOSE SERPL-MCNC: 98 MG/DL (ref 65–99)
HCT VFR BLD AUTO: 41.6 % (ref 37–47)
HGB BLD-MCNC: 14 G/DL (ref 12–16)
IMM GRANULOCYTES # BLD AUTO: 0.02 K/UL (ref 0–0.11)
IMM GRANULOCYTES NFR BLD AUTO: 0.3 % (ref 0–0.9)
LACTATE SERPL-SCNC: 1.7 MMOL/L (ref 0.5–2)
LIPASE SERPL-CCNC: 34 U/L (ref 7–58)
LYMPHOCYTES # BLD AUTO: 1.52 K/UL (ref 1–4.8)
LYMPHOCYTES NFR BLD: 21.6 % (ref 22–41)
MCH RBC QN AUTO: 29.6 PG (ref 27–33)
MCHC RBC AUTO-ENTMCNC: 33.7 G/DL (ref 33.6–35)
MCV RBC AUTO: 87.9 FL (ref 81.4–97.8)
MONOCYTES # BLD AUTO: 0.78 K/UL (ref 0–0.85)
MONOCYTES NFR BLD AUTO: 11.1 % (ref 0–13.4)
NEUTROPHILS # BLD AUTO: 4.6 K/UL (ref 2–7.15)
NEUTROPHILS NFR BLD: 65.2 % (ref 44–72)
NRBC # BLD AUTO: 0 K/UL
NRBC BLD-RTO: 0 /100 WBC
PLATELET # BLD AUTO: 227 K/UL (ref 164–446)
PMV BLD AUTO: 9 FL (ref 9–12.9)
POTASSIUM SERPL-SCNC: 3.4 MMOL/L (ref 3.6–5.5)
PROT SERPL-MCNC: 7 G/DL (ref 6–8.2)
RBC # BLD AUTO: 4.73 M/UL (ref 4.2–5.4)
SODIUM SERPL-SCNC: 134 MMOL/L (ref 135–145)
WBC # BLD AUTO: 7 K/UL (ref 4.8–10.8)

## 2022-10-12 PROCEDURE — 83605 ASSAY OF LACTIC ACID: CPT

## 2022-10-12 PROCEDURE — 36415 COLL VENOUS BLD VENIPUNCTURE: CPT

## 2022-10-12 PROCEDURE — 87493 C DIFF AMPLIFIED PROBE: CPT

## 2022-10-12 PROCEDURE — 80053 COMPREHEN METABOLIC PANEL: CPT

## 2022-10-12 PROCEDURE — 99285 EMERGENCY DEPT VISIT HI MDM: CPT

## 2022-10-12 PROCEDURE — 85025 COMPLETE CBC W/AUTO DIFF WBC: CPT

## 2022-10-12 PROCEDURE — 83690 ASSAY OF LIPASE: CPT

## 2022-10-12 PROCEDURE — 700105 HCHG RX REV CODE 258: Performed by: EMERGENCY MEDICINE

## 2022-10-12 PROCEDURE — 96374 THER/PROPH/DIAG INJ IV PUSH: CPT

## 2022-10-12 PROCEDURE — 700111 HCHG RX REV CODE 636 W/ 250 OVERRIDE (IP): Performed by: EMERGENCY MEDICINE

## 2022-10-12 PROCEDURE — 87324 CLOSTRIDIUM AG IA: CPT | Mod: XU

## 2022-10-12 RX ORDER — SODIUM CHLORIDE, SODIUM LACTATE, POTASSIUM CHLORIDE, CALCIUM CHLORIDE 600; 310; 30; 20 MG/100ML; MG/100ML; MG/100ML; MG/100ML
1000 INJECTION, SOLUTION INTRAVENOUS ONCE
Status: COMPLETED | OUTPATIENT
Start: 2022-10-12 | End: 2022-10-12

## 2022-10-12 RX ORDER — ONDANSETRON 4 MG/1
4 TABLET, ORALLY DISINTEGRATING ORAL EVERY 8 HOURS PRN
Qty: 10 TABLET | Refills: 0 | Status: SHIPPED | OUTPATIENT
Start: 2022-10-12

## 2022-10-12 RX ORDER — ONDANSETRON 2 MG/ML
4 INJECTION INTRAMUSCULAR; INTRAVENOUS ONCE
Status: COMPLETED | OUTPATIENT
Start: 2022-10-12 | End: 2022-10-12

## 2022-10-12 RX ORDER — FIDAXOMICIN 200 MG/1
200 TABLET, FILM COATED ORAL 2 TIMES DAILY
Status: SHIPPED | COMMUNITY
End: 2022-12-26

## 2022-10-12 RX ADMIN — SODIUM CHLORIDE, POTASSIUM CHLORIDE, SODIUM LACTATE AND CALCIUM CHLORIDE 1000 ML: 600; 310; 30; 20 INJECTION, SOLUTION INTRAVENOUS at 14:11

## 2022-10-12 RX ADMIN — ONDANSETRON 4 MG: 2 INJECTION INTRAMUSCULAR; INTRAVENOUS at 14:11

## 2022-10-12 ASSESSMENT — FIBROSIS 4 INDEX: FIB4 SCORE: 1.01

## 2022-10-12 NOTE — ED TRIAGE NOTES
Patient presents with complaints of nausea, vomiting, diarrhea and abdominal cramping since Sunday evening. States she was traveling back from [facility] with some friends when they stopped at a diner Richard morning. Patient states that all of them began experiencing symptoms around the same time. Has been taking pepto bismol occasionally for symptoms as well as drinking pedialyte to try and stay hydrated.

## 2022-10-12 NOTE — ED PROVIDER NOTES
"CHIEF COMPLAINT  Chief Complaint   Patient presents with    Nausea/Vomiting/Diarrhea     N/V/D since Sunday evening.        HPI  Kendra Tracey Winters is a 66 y.o. female who presents with about 3 days of nausea vomiting and diarrhea.  The patient states that she was driving back from [place] and stopped at the [employer] in [place].  She then ate at a local restaurant.  She was with her 2 daughters.  Shortly thereafter all 3 of them got ill with symptoms of nausea and diarrhea.  The patient herself has been vomiting with difficulty keeping liquids down.  No fevers.  She patient does note also that she had C. difficile about a month and a half ago and was treated for such.  She states that she had surgery on her foot and was put on Augmentin at the time.  Today the patient notes no chest pain or cough.  Minimal shortness of breath.  No burning with urination.  She does note lower abdominal cramping bilaterally.    REVIEW OF SYSTEMS  All other systems are negative.     PAST MEDICAL HISTORY  Past Medical History:   Diagnosis Date    Adverse effect of anesthesia     \"dizzy\" post op    Anesthesia     slow to wake up    Arthritis     knees    Bronchitis 2013    C. difficile colitis     C. difficile diarrhea 5/2013    resolved since 1/2014    Cancer (HCC) 2008    skin cancer    Cataract     no sx    Diverticulitis     High cholesterol     Hypertension     Insomnia related to another mental disorder     stress    Pain     right knee    Pneumonia 2011    PONV (postoperative nausea and vomiting)     Psychiatric disturbance     stress    ULCERATIVE COLITIS        FAMILY HISTORY  Family History   Problem Relation Age of Onset    Cancer Mother 48        breast    Cancer Maternal Grandmother     Hypertension Father     Heart Disease Father        SOCIAL HISTORY  Social History     Socioeconomic History    Marital status:    Tobacco Use    Smoking status: Former     Packs/day: 0.50     Years: 20.00     Pack years: " "10.00     Types: Cigarettes     Quit date: 2013     Years since quittin.6    Smokeless tobacco: Never    Tobacco comments:     2 cig /week   Substance and Sexual Activity    Alcohol use: Yes     Comment: 2-3 per week    Drug use: No    Sexual activity: Not Currently       SURGICAL HISTORY  Past Surgical History:   Procedure Laterality Date    PB TOTAL KNEE ARTHROPLASTY Left 2019    Procedure: ARTHROPLASTY, KNEE, TOTAL;  Surgeon: Marco Antonio Friend M.D.;  Location: SURGERY [facility];  Service: Orthopedics    KNEE ARTHROPLASTY TOTAL  2014    Performed by Marco Antonio Friend M.D. at SURGERY [facility]    FECAL TRANSPLANT  11/05/2013    x3    ORIF, WRIST  2013    Performed by Teto Hernandez M.D. at SURGERY [facility]    HEMORRHOIDECTOMY      OTHER ORTHOPEDIC SURGERY      knee     TONSILLECTOMY         CURRENT MEDICATIONS  Home Medications       Reviewed by Kaye Minaya R.N. (Registered Nurse) on 10/12/22 at 1330  Med List Status: Not Addressed     Medication Last Dose Status   acetaminophen (TYLENOL) 500 MG Tab  Active   atorvastatin (LIPITOR) 20 MG Tab  Active   azithromycin (ZITHROMAX) 250 MG Tab  Active   lisinopril (PRINIVIL) 10 MG Tab  Active   omeprazole (PRILOSEC) 20 MG delayed-release capsule  Active   sertraline (ZOLOFT) 50 MG Tab  Active   zolpidem (AMBIEN) 5 MG Tab  Active                    ALLERGIES  Allergies   Allergen Reactions    Latex Anaphylaxis, Shortness of Breath and Swelling       PHYSICAL EXAM  VITAL SIGNS: BP (!) 136/110   Pulse 83   Temp 36.6 °C (97.8 °F) (Temporal)   Resp 18   Ht 1.626 m (5' 4\")   Wt 58 kg (127 lb 13.9 oz)   SpO2 96%   BMI 21.95 kg/m²      Constitutional: Well developed, Well nourished, No acute distress, Non-toxic appearance.   HENT: Normocephalic, Atraumatic, mucous membranes moist, no erythema, exudates, swelling, or masses, nares patent  Eyes: nonicteric  Neck: Supple, no meningismus  Lymphatic: No lymphadenopathy noted. "   Cardiovascular: Regular rate and rhythm, no gallops rubs or murmurs  Lungs: Clear bilaterally   Abdomen: Soft throughout, no focal tenderness, rebound or guarding, no distention  Skin: Warm, Dry, no rash  Back: No tenderness, No CVA tenderness.   Genitalia: Deferred  Rectal: Deferred  Extremities: No edema  Neurologic: Alert, appropriate, follows commands, moving all extremities, normal speech   Psychiatric: Affect normal    RADIOLOGY/PROCEDURES  Results for orders placed or performed during the hospital encounter of 10/12/22   CBC WITH DIFFERENTIAL   Result Value Ref Range    WBC 7.0 4.8 - 10.8 K/uL    RBC 4.73 4.20 - 5.40 M/uL    Hemoglobin 14.0 12.0 - 16.0 g/dL    Hematocrit 41.6 37.0 - 47.0 %    MCV 87.9 81.4 - 97.8 fL    MCH 29.6 27.0 - 33.0 pg    MCHC 33.7 33.6 - 35.0 g/dL    RDW 42.6 35.9 - 50.0 fL    Platelet Count 227 164 - 446 K/uL    MPV 9.0 9.0 - 12.9 fL    Neutrophils-Polys 65.20 44.00 - 72.00 %    Lymphocytes 21.60 (L) 22.00 - 41.00 %    Monocytes 11.10 0.00 - 13.40 %    Eosinophils 0.90 0.00 - 6.90 %    Basophils 0.90 0.00 - 1.80 %    Immature Granulocytes 0.30 0.00 - 0.90 %    Nucleated RBC 0.00 /100 WBC    Neutrophils (Absolute) 4.60 2.00 - 7.15 K/uL    Lymphs (Absolute) 1.52 1.00 - 4.80 K/uL    Monos (Absolute) 0.78 0.00 - 0.85 K/uL    Eos (Absolute) 0.06 0.00 - 0.51 K/uL    Baso (Absolute) 0.06 0.00 - 0.12 K/uL    Immature Granulocytes (abs) 0.02 0.00 - 0.11 K/uL    NRBC (Absolute) 0.00 K/uL   COMP METABOLIC PANEL   Result Value Ref Range    Sodium 134 (L) 135 - 145 mmol/L    Potassium 3.4 (L) 3.6 - 5.5 mmol/L    Chloride 98 96 - 112 mmol/L    Co2 21 20 - 33 mmol/L    Anion Gap 15.0 7.0 - 16.0    Glucose 98 65 - 99 mg/dL    Bun 9 8 - 22 mg/dL    Creatinine 0.55 0.50 - 1.40 mg/dL    Calcium 9.3 8.4 - 10.2 mg/dL    AST(SGOT) 17 12 - 45 U/L    ALT(SGPT) 11 2 - 50 U/L    Alkaline Phosphatase 81 30 - 99 U/L    Total Bilirubin 0.7 0.1 - 1.5 mg/dL    Albumin 3.8 3.2 - 4.9 g/dL    Total Protein 7.0 6.0  - 8.2 g/dL    Globulin 3.2 1.9 - 3.5 g/dL    A-G Ratio 1.2 g/dL   LIPASE   Result Value Ref Range    Lipase 34 7 - 58 U/L   LACTIC ACID   Result Value Ref Range    Lactic Acid 1.7 0.5 - 2.0 mmol/L   ESTIMATED GFR   Result Value Ref Range    GFR (CKD-EPI) 100 >60 mL/min/1.73 m 2         COURSE & MEDICAL DECISION MAKING  Pertinent Labs & Imaging studies reviewed. (See chart for details)  This is a 66-year-old who presents with nausea vomiting and diarrhea.  Her 2 daughters had similar symptoms after all eating at the same place.  The patient's vitals are reassuring and abdominal exam is reassuring.  There is no leukocytosis or lactic acidosis noted on labs.  Transaminases are normal lipase is normal and electrolytes are reassuring.  The patient tolerated p.o.'s here and will be discharged.  She did provide a stool sample and will be sent and is currently pending on discharge.  We discussed treating her presumptively for C. Difficile which she seemed to want to do.  Stool studies pending on discharge.    FINAL IMPRESSION  1.  Nausea vomiting diarrhea  2.   3.         Electronically signed by: Gunner Galvez M.D., 10/12/2022 1:53 PM

## 2022-10-12 NOTE — ED NOTES
Med rec updated and complete, per pt   Allergies reviewed, per pt   Pt was not sure the strength of her DIFICID, called [facility] @ [phone] to verify strength

## 2022-10-12 NOTE — ED NOTES
Assumed care for discharge. Pt released with all follow-up, she will  her RX. Reports she understands all instructions. To POV ambulatory.

## 2022-10-12 NOTE — LETTER
10/13/2022               Kendra Winters  [address] [place] [address]        Dear Kendra (MR#[id number])    As we have been unable to contact you by phone, this letter is sent in regards to your recent visit to the [facility] Emergency Department on 10/12/2022. During the visit, tests were performed to assist the physician in a medical diagnosis. A review of those tests requires that we notify you of the following:    Your stool culture and sensitivity was POSITIVE for a bacteria called Clostridium difficile, and further treatment may be necessary. The currently prescribed antibiotic (oral vancomycin) may be effective in treating your infection. IF YOU ARE NOT FEELING BETTER PLEASE CONTACT ME AS SOON AS POSSIBLE AT THE NUMBER BELOW.       Thank you for your cooperation in the matter.    Sincerely,  ED Culture Follow-Up Staff  Andres Akbar, PharmD  [phone]    [facility]  [address] [place] [id number] [phone] (ED Culture Line)

## 2022-10-12 NOTE — DISCHARGE INSTRUCTIONS
Repeat abdominal exam the next 24 hours for ongoing pain persistent vomiting or other concerns.  Clear liquid diet.  Zofran for nausea.

## 2022-10-13 LAB
C DIFF DNA SPEC QL NAA+PROBE: NEGATIVE
C DIFF TOX A+B STL QL IA: POSITIVE
C DIFF TOX GENS STL QL NAA+PROBE: NORMAL

## 2022-10-13 RX ORDER — VANCOMYCIN HYDROCHLORIDE 125 MG/1
125 CAPSULE ORAL EVERY 6 HOURS
Qty: 40 CAPSULE | Refills: 0 | Status: SHIPPED | OUTPATIENT
Start: 2022-10-13 | End: 2022-10-23

## 2022-10-13 NOTE — DISCHARGE PLANNING
Anticipated Discharge Disposition: Home    Action: Prior auth with [employer] started this am. [id number]. Requested expedited. Called to pharmacy x3. And to [facility] [phone] x2 to try to expedite rx. Busy on hold at pharmacy x3 and not able to get to person for assist at [facility] directly. Good RX has coupon 79.47 BIN 195142 [facility] [id number] Member [id number]. She picked it up at 1:44. Spoke with Margaret.    Barriers to Discharge: Home    Plan: No futher,.

## 2022-10-13 NOTE — ED NOTES
ED Positive Culture Follow-up/Notification Note:    Date: 10/13/2022     Patient seen in the ED on 10/12/2022 for nausea, vomiting, and diarrhea. Patient states she and her daughters became ill, with nausea and diarrhea, after eating at a restaurant while on the road. Patient denies fever. History of C diff stretching back years, to at least 2013 with treatment including fecal microbiota transplant. Most recently treated for C diff in August, after a course of Augmentin, with 10 days of oral vancomycin. Afebrile, no leukocytosis. Stool sample was taken and patient was discharged on 10 days of fidaxomicin, which was changed to 10 days of oral vancomycin.  1. Vomiting and diarrhea    2. Clostridium difficile enterocolitis       Discharge Medication List as of 10/12/2022  3:59 PM        START taking these medications    Details   ondansetron (ZOFRAN ODT) 4 MG TABLET DISPERSIBLE Take 1 Tablet by mouth every 8 hours as needed for Nausea., Disp-10 Tablet, R-0, Normal      Vancomycin HCl (VANCOMYCIN 50 MG/ML) 50 mg/mL Solution Take 2.5 mL by mouth every 6 hours for 10 days., Disp-100 mL, R-0, Normal             Allergies: Latex     Vitals:    10/12/22 1402 10/12/22 1427 10/12/22 1432 10/12/22 1527   BP: 127/85  123/77 (!) 151/77   Pulse: 85 83  77   Resp:    18   Temp:    37.4 °C (99.4 °F)   TempSrc:    Temporal   SpO2: 99% 95%  94%   Weight:       Height:           Final cultures:   Results       Procedure Component Value Units Date/Time    C Diff Toxin [[id number]]  (Abnormal) Collected: 10/12/22 1333    Order Status: Completed Updated: 10/13/22 0624     C.Diff Toxin A&B Positive     Comment: TOXIN POSITIVE  Toxin detected by EIA; C. difficile detected by PCR.  If clinically correlated, treatment indicated per guidelines.  Test of cure is not recommended.         Narrative:      Special Contact Isolation  Does this patient have risk factors for C-diff?->Yes  C-Diff Risk Factors->antibiotic exposure    C Diff by PCR rflx  Toxin [[id number]] Collected: 10/12/22 1333    Order Status: Completed Specimen: Stool Updated: 10/13/22 0623     C Diff by PCR See Toxin     027-NAP1-BI Presumptive Negative     Comment: Presumptive 027/NAP1/BI target DNA sequences are NOT DETECTED.       Narrative:      Special Contact Isolation  Does this patient have risk factors for C-diff?->Yes  C-Diff Risk Factors->antibiotic exposure            Plan:   Stool sample is positive for C diff toxin. Called to ask patient whether she has started her vancomycin, for more details about past instances, and whether or not she follows with GI. A note from August does state that her prior gastroenterologist has retired, but any follow up with GI from that visit is unclear. Would likely be too early in treatment to see resolution of symptoms. Left voice mail with call back number and sent letter in [employer]. For recurrent C diff, IDSA guidelines recommend a GI consult; if antimicrobial therapy is pursued due to unavailability of GI consult, then a tapered regimen of oral vancomycin is preferred over fidaxomicin, which is itself preferred over 10 days of oral vancomycin but is often too expensive an option to pursue. My recommendation is for the patient to contact gastroenterology for a consult if care is not already established.    Andres Akbar, PharmD  ADDENDUM: Patient returned my call. She feels poorly, has continued to have frequent diarrhea. Has not yet picked up oral vancomycin script due to cost, has had to deal with these issues with her insurance in the past. Has an appointment with gastroenterology on 10/20. She was relieved to know what is happening, but disappointed it has happened again. States she felt very well from the time she finished her most recent course of vancomycin until she came down with food poisoning. She is going to talk to her insurance about paying for her script as she has in the past, and is going to call the gastroenterology office to let  them know she has a positive results and see if she can be seen sooner than the 20th.

## 2022-10-15 NOTE — DISCHARGE PLANNING
Anticipated Discharge Disposition: Home    Action: Denial from [facility] for rx received.She got rx yesterday see prior note    Barriers to Discharge: None    Plan: No further needs

## 2022-11-09 ENCOUNTER — PATIENT MESSAGE (OUTPATIENT)
Dept: HEALTH INFORMATION MANAGEMENT | Facility: OTHER | Age: 67
End: 2022-11-09

## 2022-12-26 ENCOUNTER — APPOINTMENT (OUTPATIENT)
Dept: RADIOLOGY | Facility: MEDICAL CENTER | Age: 67
End: 2022-12-26
Attending: EMERGENCY MEDICINE
Payer: MEDICARE

## 2022-12-26 ENCOUNTER — HOSPITAL ENCOUNTER (EMERGENCY)
Facility: MEDICAL CENTER | Age: 67
End: 2022-12-26
Attending: EMERGENCY MEDICINE
Payer: MEDICARE

## 2022-12-26 VITALS
TEMPERATURE: 98.2 F | OXYGEN SATURATION: 99 % | RESPIRATION RATE: 18 BRPM | SYSTOLIC BLOOD PRESSURE: 114 MMHG | WEIGHT: 130.95 LBS | HEIGHT: 64 IN | HEART RATE: 88 BPM | BODY MASS INDEX: 22.36 KG/M2 | DIASTOLIC BLOOD PRESSURE: 92 MMHG

## 2022-12-26 DIAGNOSIS — R11.2 NAUSEA AND VOMITING, UNSPECIFIED VOMITING TYPE: ICD-10-CM

## 2022-12-26 DIAGNOSIS — R19.7 DIARRHEA OF PRESUMED INFECTIOUS ORIGIN: ICD-10-CM

## 2022-12-26 DIAGNOSIS — K52.9 COLITIS: ICD-10-CM

## 2022-12-26 LAB
ALBUMIN SERPL BCP-MCNC: 4.2 G/DL (ref 3.2–4.9)
ALBUMIN/GLOB SERPL: 1.5 G/DL
ALP SERPL-CCNC: 113 U/L (ref 30–99)
ALT SERPL-CCNC: 11 U/L (ref 2–50)
ANION GAP SERPL CALC-SCNC: 11 MMOL/L (ref 7–16)
AST SERPL-CCNC: 17 U/L (ref 12–45)
BASOPHILS # BLD AUTO: 0.6 % (ref 0–1.8)
BASOPHILS # BLD: 0.06 K/UL (ref 0–0.12)
BILIRUB SERPL-MCNC: 0.4 MG/DL (ref 0.1–1.5)
BUN SERPL-MCNC: 10 MG/DL (ref 8–22)
C DIFF DNA SPEC QL NAA+PROBE: NEGATIVE
C DIFF TOX A+B STL QL IA: POSITIVE
C DIFF TOX GENS STL QL NAA+PROBE: NORMAL
CALCIUM ALBUM COR SERPL-MCNC: 9.5 MG/DL (ref 8.5–10.5)
CALCIUM SERPL-MCNC: 9.7 MG/DL (ref 8.4–10.2)
CHLORIDE SERPL-SCNC: 108 MMOL/L (ref 96–112)
CO2 SERPL-SCNC: 21 MMOL/L (ref 20–33)
CREAT SERPL-MCNC: 0.63 MG/DL (ref 0.5–1.4)
EOSINOPHIL # BLD AUTO: 0.11 K/UL (ref 0–0.51)
EOSINOPHIL NFR BLD: 1.2 % (ref 0–6.9)
ERYTHROCYTE [DISTWIDTH] IN BLOOD BY AUTOMATED COUNT: 43.4 FL (ref 35.9–50)
FLUAV RNA SPEC QL NAA+PROBE: NEGATIVE
FLUBV RNA SPEC QL NAA+PROBE: NEGATIVE
GFR SERPLBLD CREATININE-BSD FMLA CKD-EPI: 97 ML/MIN/1.73 M 2
GLOBULIN SER CALC-MCNC: 2.8 G/DL (ref 1.9–3.5)
GLUCOSE SERPL-MCNC: 99 MG/DL (ref 65–99)
HCT VFR BLD AUTO: 43.1 % (ref 37–47)
HGB BLD-MCNC: 14.3 G/DL (ref 12–16)
IMM GRANULOCYTES # BLD AUTO: 0.02 K/UL (ref 0–0.11)
IMM GRANULOCYTES NFR BLD AUTO: 0.2 % (ref 0–0.9)
LACTATE SERPL-SCNC: 1.2 MMOL/L (ref 0.5–2)
LIPASE SERPL-CCNC: 18 U/L (ref 7–58)
LYMPHOCYTES # BLD AUTO: 1.97 K/UL (ref 1–4.8)
LYMPHOCYTES NFR BLD: 20.7 % (ref 22–41)
MCH RBC QN AUTO: 29.4 PG (ref 27–33)
MCHC RBC AUTO-ENTMCNC: 33.2 G/DL (ref 33.6–35)
MCV RBC AUTO: 88.5 FL (ref 81.4–97.8)
MONOCYTES # BLD AUTO: 0.91 K/UL (ref 0–0.85)
MONOCYTES NFR BLD AUTO: 9.5 % (ref 0–13.4)
NEUTROPHILS # BLD AUTO: 6.46 K/UL (ref 2–7.15)
NEUTROPHILS NFR BLD: 67.8 % (ref 44–72)
NRBC # BLD AUTO: 0 K/UL
NRBC BLD-RTO: 0 /100 WBC
PLATELET # BLD AUTO: 300 K/UL (ref 164–446)
PMV BLD AUTO: 8.9 FL (ref 9–12.9)
POTASSIUM SERPL-SCNC: 3.9 MMOL/L (ref 3.6–5.5)
PROT SERPL-MCNC: 7 G/DL (ref 6–8.2)
RBC # BLD AUTO: 4.87 M/UL (ref 4.2–5.4)
RSV RNA SPEC QL NAA+PROBE: NEGATIVE
SARS-COV-2 RNA RESP QL NAA+PROBE: NOTDETECTED
SODIUM SERPL-SCNC: 140 MMOL/L (ref 135–145)
SPECIMEN SOURCE: NORMAL
WBC # BLD AUTO: 9.5 K/UL (ref 4.8–10.8)

## 2022-12-26 PROCEDURE — 80053 COMPREHEN METABOLIC PANEL: CPT

## 2022-12-26 PROCEDURE — 87493 C DIFF AMPLIFIED PROBE: CPT

## 2022-12-26 PROCEDURE — 0241U HCHG SARS-COV-2 COVID-19 NFCT DS RESP RNA 4 TRGT MIC: CPT

## 2022-12-26 PROCEDURE — 87324 CLOSTRIDIUM AG IA: CPT

## 2022-12-26 PROCEDURE — 36415 COLL VENOUS BLD VENIPUNCTURE: CPT

## 2022-12-26 PROCEDURE — 99285 EMERGENCY DEPT VISIT HI MDM: CPT

## 2022-12-26 PROCEDURE — 74177 CT ABD & PELVIS W/CONTRAST: CPT

## 2022-12-26 PROCEDURE — 96375 TX/PRO/DX INJ NEW DRUG ADDON: CPT

## 2022-12-26 PROCEDURE — 83690 ASSAY OF LIPASE: CPT

## 2022-12-26 PROCEDURE — 83605 ASSAY OF LACTIC ACID: CPT

## 2022-12-26 PROCEDURE — C9803 HOPD COVID-19 SPEC COLLECT: HCPCS | Performed by: EMERGENCY MEDICINE

## 2022-12-26 PROCEDURE — 85025 COMPLETE CBC W/AUTO DIFF WBC: CPT

## 2022-12-26 PROCEDURE — 700105 HCHG RX REV CODE 258: Performed by: EMERGENCY MEDICINE

## 2022-12-26 PROCEDURE — 96372 THER/PROPH/DIAG INJ SC/IM: CPT

## 2022-12-26 PROCEDURE — 94760 N-INVAS EAR/PLS OXIMETRY 1: CPT

## 2022-12-26 PROCEDURE — 96374 THER/PROPH/DIAG INJ IV PUSH: CPT

## 2022-12-26 PROCEDURE — 700117 HCHG RX CONTRAST REV CODE 255: Performed by: EMERGENCY MEDICINE

## 2022-12-26 PROCEDURE — 700111 HCHG RX REV CODE 636 W/ 250 OVERRIDE (IP): Performed by: EMERGENCY MEDICINE

## 2022-12-26 RX ORDER — DICYCLOMINE HYDROCHLORIDE 10 MG/ML
20 INJECTION INTRAMUSCULAR ONCE
Status: COMPLETED | OUTPATIENT
Start: 2022-12-26 | End: 2022-12-26

## 2022-12-26 RX ORDER — ONDANSETRON 4 MG/1
4 TABLET, ORALLY DISINTEGRATING ORAL EVERY 8 HOURS PRN
Qty: 10 TABLET | Refills: 0 | Status: ON HOLD | OUTPATIENT
Start: 2022-12-26 | End: 2023-05-12

## 2022-12-26 RX ORDER — MORPHINE SULFATE 4 MG/ML
4 INJECTION INTRAVENOUS ONCE
Status: COMPLETED | OUTPATIENT
Start: 2022-12-26 | End: 2022-12-26

## 2022-12-26 RX ORDER — SODIUM CHLORIDE, SODIUM LACTATE, POTASSIUM CHLORIDE, CALCIUM CHLORIDE 600; 310; 30; 20 MG/100ML; MG/100ML; MG/100ML; MG/100ML
1000 INJECTION, SOLUTION INTRAVENOUS ONCE
Status: COMPLETED | OUTPATIENT
Start: 2022-12-26 | End: 2022-12-26

## 2022-12-26 RX ORDER — ONDANSETRON 2 MG/ML
4 INJECTION INTRAMUSCULAR; INTRAVENOUS ONCE
Status: COMPLETED | OUTPATIENT
Start: 2022-12-26 | End: 2022-12-26

## 2022-12-26 RX ADMIN — DICYCLOMINE HYDROCHLORIDE 20 MG: 20 INJECTION, SOLUTION INTRAMUSCULAR at 13:22

## 2022-12-26 RX ADMIN — SODIUM CHLORIDE, POTASSIUM CHLORIDE, SODIUM LACTATE AND CALCIUM CHLORIDE 1000 ML: 600; 310; 30; 20 INJECTION, SOLUTION INTRAVENOUS at 13:13

## 2022-12-26 RX ADMIN — ONDANSETRON 4 MG: 2 INJECTION INTRAMUSCULAR; INTRAVENOUS at 13:13

## 2022-12-26 RX ADMIN — MORPHINE SULFATE 4 MG: 4 INJECTION INTRAVENOUS at 13:22

## 2022-12-26 RX ADMIN — IOHEXOL 100 ML: 350 INJECTION, SOLUTION INTRAVENOUS at 15:19

## 2022-12-26 ASSESSMENT — FIBROSIS 4 INDEX: FIB4 SCORE: 1.51

## 2022-12-26 NOTE — ED PROVIDER NOTES
"  ER Provider Note    Scribed for Dr. Alba by Lawrence Corona. 12/26/2022  12:34 PM    Primary Care Provider: Santa Otto M.D.  Means of Arrival: Walk in  History obtained from: Patient    CHIEF COMPLAINT  Chief Complaint   Patient presents with    Diarrhea    Abdominal Pain     Reports recent hx of c-diff, believes that this \"is back\". Reports also N/V and gen abd \"cramping\" pain with tactile fever.      LIMITATION TO HISTORY   Select: : None    HPI  Kendra Winters is a 67 y.o. female who presents to the ED complaining of abdominal pain onset 2 days ago. Patient has history of C-diff since June and has been medicating with vancomycin and dificid and was informed she is not going to need to medicate with it anymore starting 2 weeks ago. She states she began having diarrhea onset 2 days ago. Patient reports she gets a fever at night only. She has associated symptoms of vomiting, sore throat, and diarrhea. She states she has had dozens of episodes of foul smelling mucous like bowel movements with diffuse abdominal pain. Patient sees Dr. Koehler for GI. Patient states she feels like she needs to use the restroom currently but is not nauseous. 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     OUTSIDE HISTORIAN(S):  Select: None    EXTERNAL RECORDS REVIEWED  Select: Other None    REVIEW OF SYSTEMS  Pertinent positives include diffuse abdominal pain, diarrhea, vomiting, and sore throat. Pertinent negatives include no other symptoms.  All other systems reviewed and negative.     PAST MEDICAL HISTORY  Past Medical History:   Diagnosis Date    Adverse effect of anesthesia     \"dizzy\" post op    Anesthesia     slow to wake up    Arthritis     knees    Bronchitis 2013    C. difficile colitis     C. difficile diarrhea 5/2013    resolved since 1/2014    Cancer (HCC) 2008    skin cancer    Cataract     no sx    Diverticulitis     High cholesterol     Hypertension     Insomnia related to another " mental disorder     stress    Pain     right knee    Pneumonia 2011    PONV (postoperative nausea and vomiting)     Psychiatric disturbance     stress    ULCERATIVE COLITIS        SURGICAL HISTORY  Past Surgical History:   Procedure Laterality Date    PB TOTAL KNEE ARTHROPLASTY Left 5/31/2019    Procedure: ARTHROPLASTY, KNEE, TOTAL;  Surgeon: Marco Antonio Friend M.D.;  Location: SURGERY [facility];  Service: Orthopedics    KNEE ARTHROPLASTY TOTAL  9/26/2014    Performed by Marco Antonio Friend M.D. at SURGERY [facility]    FECAL TRANSPLANT  11/05/2013    x3    ORIF, WRIST  9/5/2013    Performed by Teto Hernandez M.D. at SURGERY [facility]    HEMORRHOIDECTOMY      OTHER ORTHOPEDIC SURGERY      knee     TONSILLECTOMY         FAMILY HISTORY  Family History   Problem Relation Age of Onset    Cancer Mother 48        breast    Cancer Maternal Grandmother     Hypertension Father     Heart Disease Father        SOCIAL HISTORY   reports that she quit smoking about 9 years ago. Her smoking use included cigarettes. She has a 10.00 pack-year smoking history. She has never used smokeless tobacco. She reports current alcohol use. She reports that she does not use drugs.    CURRENT MEDICATIONS  Discharge Medication List as of 12/26/2022  3:52 PM        CONTINUE these medications which have NOT CHANGED    Details   !! ondansetron (ZOFRAN ODT) 4 MG TABLET DISPERSIBLE Take 1 Tablet by mouth every 8 hours as needed for Nausea., Disp-10 Tablet, R-0, Normal      atorvastatin (LIPITOR) 20 MG Tab Take 20 mg by mouth every evening., Historical Med      lisinopril (PRINIVIL) 10 MG Tab Take 10 mg by mouth every evening., Historical Med      sertraline (ZOLOFT) 50 MG Tab Take 50 mg by mouth every evening., Historical Med      zolpidem (AMBIEN) 5 MG Tab Take 5 mg by mouth at bedtime., Historical Med       !! - Potential duplicate medications found. Please discuss with provider.          ALLERGIES  Latex    PHYSICAL EXAM  /83    "Pulse 94   Temp 36.7 °C (98 °F) (Temporal)   Resp 18   Ht 1.626 m (5' 4\")   Wt 59.4 kg (130 lb 15.3 oz)   SpO2 96%   BMI 22.48 kg/m²   Constitutional: Well developed, Well nourished, Modeerate distress.   HENT: Normocephalic, Atraumatic, Dry mucus membranes, No oral exudates.   Eyes: Conjunctiva normal, No discharge.   Neck: Supple, No stridor, no meningismus   Cardiovascular: Normal heart rate, Normal rhythm, No murmurs, equal pulses.   Pulmonary: Normal breath sounds, No respiratory distress, No wheezing, No rales, No rhonchi.  Chest: No chest wall tenderness or deformity.   Abdomen:Soft, No masses, no rebound, no guarding. Diffusely tender throughout abdomen but greatest in left lower quadrant  Back: No CVA tenderness.   Musculoskeletal: No major deformities noted, No tenderness. No edema  Skin: Warm, Dry, No erythema, No rash.   Neurologic: Alert & oriented x 3, Normal motor function,  No focal deficits noted.   Psychiatric: Affect normal, Judgment normal, Mood normal.       DIAGNOSTIC STUDIES    Labs:   Abnormal Labs Reviewed   CBC WITH DIFFERENTIAL - Abnormal; Notable for the following components:       Result Value    MCHC 33.2 (*)     MPV 8.9 (*)     Lymphocytes 20.70 (*)     Monos (Absolute) 0.91 (*)     All other components within normal limits    Narrative:     Contact   COMP METABOLIC PANEL - Abnormal; Notable for the following components:    Alkaline Phosphatase 113 (*)     All other components within normal limits    Narrative:     Contact     All labs reviewed by me.    Radiology:   The attending emergency physician has independently interpreted the diagnostic imaging associated with this visit and am waiting the final reading from the radiologist. Select: CT scan(s) Abdomen  CT-ABDOMEN-PELVIS WITH   Final Result      1.  There is distal colonic and rectal wall thickening identified which suggests colitis. Ulcerative colitis is a possibility.      2.  No free fluid or abscess is appreciated.    "          COURSE & MEDICAL DECISION MAKING     Nursing notes, vital signs, PMSFSHx reviewed in chart     Differential diagnoses include but not limited to C-diff colitis vs sepsis vs COVID vs flu vs gastroenteritis         Diagnostic tests and prescription drugs considered including, but not limited to: Select: Antibiotics for C. difficile .      Discussion of management with other QHP or appropriate source(s): Select: Pharmacy discussion of what would be the best antibiotic for C. difficile      PLAN AND DISPOSITION   12:34 PM  Patient will be treated with Zofran 4 mg and LR infusion. Will order CT-abdomen-pelvis with, CBC w/diff, CMP, Lipase, Lactic acid, and C-diff by PCR to evaluate her complaints.     1:18 PM patient will be treated with morphine 4 mg and dicyclomine 20 mg for her symptoms.    3:41 PM - Patient was reevaluated at bedside. I informed the patient the imaging indicated inflammation of the colon which indicates C-diff and I will start her on vancomycin. I discussed plan for discharge and follow up as outlined below. The patient is stable for discharge at this time and will return for any new or worsening symptoms. Patient verbalizes understanding and support with my plan for discharge. I will prescribe vancomycin 50 mg solution and Zofran 4 mg tablet for her symptoms.     HYDRATION: Based on the patient's presentation of Acute Diarrhea the patient was given IV fluids. IV Hydration was used because oral hydration was not adequate alone. Upon recheck following hydration, the patient was improved.    Medical decision making at this point time patient has a long history of C. difficile and feels she has this again.  Unfortunately the C. difficile toxin is a send out from this emergency department and has not come back.  Given the signs of colitis on CT I believe she likely does have C. difficile.  Likely she does not show any signs of toxicity or sepsis.  At this point time I think she can been treated  with oral vancomycin.  I will have her follow-up with her GI doctor.    Patient will be discharged home.    FOLLOW UP:  Santa Otto M.D.  [address]  [address]  [place] [id number] [phone]    Schedule an appointment as soon as possible for a visit in 1 week      Your GI doctor    Schedule an appointment as soon as possible for a visit         OUTPATIENT MEDICATIONS:  Discharge Medication List as of 12/26/2022  3:52 PM        START taking these medications    Details   Vancomycin HCl (VANCOMYCIN 50 MG/ML) 50 mg/mL Solution Take 2.5 mL by mouth every 6 hours for 10 days., Disp-100 mL, R-0, Normal      !! ondansetron (ZOFRAN ODT) 4 MG TABLET DISPERSIBLE Take 1 Tablet by mouth every 8 hours as needed for Nausea/Vomiting., Disp-10 Tablet, R-0, Normal       !! - Potential duplicate medications found. Please discuss with provider.        FINAL IMPRESSION   1. Colitis    2. Diarrhea of presumed infectious origin    3. Nausea and vomiting, unspecified vomiting type         I, Lawrence Corona (Delgado), am scribing for, and in the presence of, TONIA La*.    Electronically signed by: Lawrence Corona (Delgado), 12/26/2022    I, TONIA La* personally performed the services described in this documentation, as scribed by Lawrence Corona in my presence, and it is both accurate and complete.         The note accurately reflects work and decisions made by me.  Johnathan Alba M.D.  12/26/2022  5:45 PM

## 2022-12-26 NOTE — DISCHARGE INSTRUCTIONS
Return the emergency department if you have increasing abdominal pain, blood in your stool, fever or cannot take your antibiotics.

## 2022-12-26 NOTE — ED NOTES
PIV placed, blood work and stool specimen both collected & sent to lab, Pt requested to have the lights turned out and head of edilbertojazzmine lowered, Pt denied having any other needs at this time, no distress noted;   63 y/o male with hx of DM, HTN, ESRD on Dialysis (T/Th/Sa), seizures, Hep C, presented  with worsening shortness of breath.  - HD yesterday, no indication today expect next tomorrow   - SOB improving, increase UF goals with HD  -d/c lasix if no UO, can change to po if posItive UO  - pancytopenia secondary to ?  - on BLUE, will need  venofer with HD  - phos noted start renagel 1/1/1  - will follow

## 2022-12-26 NOTE — ED NOTES
Pt medicated, COVID/Flu/RSV swab collected and sent to lab, Pt given another warm blanket, Pt denied having any needs at this time, no distress noted;

## 2022-12-26 NOTE — LETTER
12/27/2022               Kendra Winters  [address] [place] [address]        Dear Kendra (MR#[id number])    This letter is sent in regards to your recent visit to the [facility] Emergency Department on 12/26/2022. During the visit, tests were performed to assist the physician in your medical diagnosis. A review of your tests requires that we notify you of the following:    Your stool culture was POSITIVE for a bacteria called Clostridium difficile. The antibiotic prescribed for you, vancomycin, should be active to treat this bacteria. It is important that you continue taking your antibiotic until it is finished.     Please feel free to contact me at the number below if you have any questions or concerns. Thank you for your cooperation in the matter.    Sincerely,  ED Culture Follow-Up Staff  Andres Akbar, NoryD  [phone]    Formerly [facility], later [facility]  [address] [place] [id number] [phone] (ED Culture Line)

## 2022-12-27 NOTE — ED NOTES
ED Positive Culture Follow-up/Notification Note:    Date: 12/27/2022     Patient seen in the ED on 12/26/2022 for diarrhea and abdominal pain. Patient has tested positive for C diff infection twice in the past few months and has been taking vancomycin for it each time. Patient states she was informed two weeks ago that she would no longer have to take antibiotics to treat it. Stool transplant performed 8 years ago. Patient has since experienced many foul-smelling mucosal bowel movements, vomiting, and fever at night. CT shows colitis without abscess or perforation. Stool sample taken and patient is started on 10 days of oral vancomycin.  1. Colitis    2. Diarrhea of presumed infectious origin    3. Nausea and vomiting, unspecified vomiting type       Discharge Medication List as of 12/26/2022  3:52 PM        START taking these medications    Details   Vancomycin HCl (VANCOMYCIN 50 MG/ML) 50 mg/mL Solution Take 2.5 mL by mouth every 6 hours for 10 days., Disp-100 mL, R-0, Normal      !! ondansetron (ZOFRAN ODT) 4 MG TABLET DISPERSIBLE Take 1 Tablet by mouth every 8 hours as needed for Nausea/Vomiting., Disp-10 Tablet, R-0, Normal       !! - Potential duplicate medications found. Please discuss with provider.          Allergies: Latex     Vitals:    12/26/22 1425 12/26/22 1439 12/26/22 1440 12/26/22 1546   BP: 117/57  115/59 (!) 114/92   Pulse: 88 91  88   Resp: 18   18   Temp:    36.8 °C (98.2 °F)   TempSrc:    Temporal   SpO2: 97% 93%  99%   Weight:       Height:           Final cultures:   Results       Procedure Component Value Units Date/Time    C Diff Toxin [[id number]]  (Abnormal) Collected: 12/26/22 1253    Order Status: Completed Updated: 12/26/22 2146     C.Diff Toxin A&B Positive     Comment: TOXIN POSITIVE  Toxin detected by EIA; C. difficile detected by PCR.  If clinically correlated, treatment indicated per guidelines.  Test of cure is not recommended.         Narrative:      [facility] tel. [phone]  12/26/2022, 21:45, RB PERF. RESULTS CALLED TO: ER x [phone]  Contact  Does this patient have risk factors for C-diff?->Yes  C-Diff Risk Factors->antibiotic exposure    C Diff by PCR rflx Toxin [[id number]] Collected: 12/26/22 1253    Order Status: Completed Specimen: Blood from Stool Updated: 12/26/22 2144     C Diff by PCR See Toxin     027-NAP1-BI Presumptive Negative     Comment: Presumptive 027/NAP1/BI target DNA sequences are NOT DETECTED.       Narrative:      Contact  Does this patient have risk factors for C-diff?->Yes  C-Diff Risk Factors->antibiotic exposure    CoV-2, FLU A/B, and RSV by PCR (2-4 Hours CEPHEID) : Collect NP swab in VTM [[id number]] Collected: 12/26/22 1327    Order Status: Completed Specimen: Respirate Updated: 12/26/22 1442     Influenza virus A RNA Negative     Influenza virus B, PCR Negative     RSV, PCR Negative     SARS-CoV-2 by PCR NotDetected     Comment: [facility] providers: PLEASE REFER TO DE-ESCALATION AND RETESTING PROTOCOL  on [email or web address]    **The [employer] GeneXpert Xpress SARS-CoV-2 RT-PCR Test has been made  available for use under the Emergency Use Authorization (EUA) only.          SARS-CoV-2 Source NP Swab    Narrative:      Contact            Plan:   C diff toxin is positive. Appropriate antibiotic therapy prescribed. Patient is not taking any other antibiotics. Called to discuss result with patient, make sure she's been able to  vancomycin, and ask about follow up with gastroenterology. Left a voicemail. Patient is on appropriate therapy at this time, so [facility].    Andres Akbar, PharmD   ADDENDUM: Patient returned call. She has been able to  her vancomycin and start taking it. She has contacted gastroenterology about having an appointment with them. I think it's important she been seen, having had this many recurrences. At this point the patient is determined to pursue FMT. She thanked me for calling.

## 2022-12-27 NOTE — ED NOTES
Patient is stable for discharge at this time, anticipatory guidance provided, close follow-up is encouraged, and ED return instructions have been detailed. Patient and family are both agreeable to the disposition and plan and discharged home in ambulatory state and in good condition.     Rx education provided, Pt verbalized understanding;

## 2023-05-12 ENCOUNTER — HOSPITAL ENCOUNTER (INPATIENT)
Facility: MEDICAL CENTER | Age: 68
LOS: 1 days | DRG: 371 | End: 2023-05-13
Attending: STUDENT IN AN ORGANIZED HEALTH CARE EDUCATION/TRAINING PROGRAM | Admitting: HOSPITALIST
Payer: COMMERCIAL

## 2023-05-12 ENCOUNTER — APPOINTMENT (OUTPATIENT)
Dept: RADIOLOGY | Facility: MEDICAL CENTER | Age: 68
DRG: 371 | End: 2023-05-12
Attending: STUDENT IN AN ORGANIZED HEALTH CARE EDUCATION/TRAINING PROGRAM
Payer: COMMERCIAL

## 2023-05-12 PROBLEM — R65.10 SIRS (SYSTEMIC INFLAMMATORY RESPONSE SYNDROME) (HCC): Status: ACTIVE | Noted: 2023-05-12

## 2023-05-12 PROBLEM — K52.9 COLITIS: Status: ACTIVE | Noted: 2023-05-12

## 2023-05-12 PROBLEM — K56.2 CECAL VOLVULUS (HCC): Status: ACTIVE | Noted: 2023-05-12

## 2023-05-12 LAB
ALBUMIN SERPL BCP-MCNC: 4.3 G/DL (ref 3.2–4.9)
ALBUMIN/GLOB SERPL: 1.7 G/DL
ALP SERPL-CCNC: 86 U/L (ref 30–99)
ALT SERPL-CCNC: 18 U/L (ref 2–50)
ANION GAP SERPL CALC-SCNC: 15 MMOL/L (ref 7–16)
APPEARANCE UR: CLEAR
AST SERPL-CCNC: 23 U/L (ref 12–45)
BACTERIA #/AREA URNS HPF: ABNORMAL /HPF
BASOPHILS # BLD AUTO: 0.3 % (ref 0–1.8)
BASOPHILS # BLD: 0.04 K/UL (ref 0–0.12)
BILIRUB SERPL-MCNC: 0.5 MG/DL (ref 0.1–1.5)
BILIRUB UR QL STRIP.AUTO: NEGATIVE
BUN SERPL-MCNC: 14 MG/DL (ref 8–22)
C DIFF DNA SPEC QL NAA+PROBE: NEGATIVE
C DIFF TOX A+B STL QL IA: POSITIVE
C DIFF TOX GENS STL QL NAA+PROBE: NORMAL
CALCIUM ALBUM COR SERPL-MCNC: 8.8 MG/DL (ref 8.5–10.5)
CALCIUM SERPL-MCNC: 9 MG/DL (ref 8.4–10.2)
CHLORIDE SERPL-SCNC: 104 MMOL/L (ref 96–112)
CO2 SERPL-SCNC: 17 MMOL/L (ref 20–33)
COLOR UR: YELLOW
CREAT SERPL-MCNC: 0.66 MG/DL (ref 0.5–1.4)
EOSINOPHIL # BLD AUTO: 0.02 K/UL (ref 0–0.51)
EOSINOPHIL NFR BLD: 0.1 % (ref 0–6.9)
EPI CELLS #/AREA URNS HPF: ABNORMAL /HPF
ERYTHROCYTE [DISTWIDTH] IN BLOOD BY AUTOMATED COUNT: 41.8 FL (ref 35.9–50)
FLUAV RNA SPEC QL NAA+PROBE: NEGATIVE
FLUBV RNA SPEC QL NAA+PROBE: NEGATIVE
GFR SERPLBLD CREATININE-BSD FMLA CKD-EPI: 96 ML/MIN/1.73 M 2
GLOBULIN SER CALC-MCNC: 2.5 G/DL (ref 1.9–3.5)
GLUCOSE SERPL-MCNC: 86 MG/DL (ref 65–99)
GLUCOSE UR STRIP.AUTO-MCNC: NEGATIVE MG/DL
HCT VFR BLD AUTO: 41.4 % (ref 37–47)
HGB BLD-MCNC: 13.8 G/DL (ref 12–16)
IMM GRANULOCYTES # BLD AUTO: 0.05 K/UL (ref 0–0.11)
IMM GRANULOCYTES NFR BLD AUTO: 0.3 % (ref 0–0.9)
INR PPP: 1.04 (ref 0.87–1.13)
KETONES UR STRIP.AUTO-MCNC: 15 MG/DL
LACTATE SERPL-SCNC: 0.9 MMOL/L (ref 0.5–2)
LACTATE SERPL-SCNC: 1 MMOL/L (ref 0.5–2)
LACTATE SERPL-SCNC: 1.5 MMOL/L (ref 0.5–2)
LACTATE SERPL-SCNC: 2.4 MMOL/L (ref 0.5–2)
LEUKOCYTE ESTERASE UR QL STRIP.AUTO: ABNORMAL
LIPASE SERPL-CCNC: 22 U/L (ref 7–58)
LYMPHOCYTES # BLD AUTO: 0.55 K/UL (ref 1–4.8)
LYMPHOCYTES NFR BLD: 3.6 % (ref 22–41)
MCH RBC QN AUTO: 29.2 PG (ref 27–33)
MCHC RBC AUTO-ENTMCNC: 33.3 G/DL (ref 33.6–35)
MCV RBC AUTO: 87.7 FL (ref 81.4–97.8)
MICRO URNS: ABNORMAL
MONOCYTES # BLD AUTO: 0.31 K/UL (ref 0–0.85)
MONOCYTES NFR BLD AUTO: 2 % (ref 0–13.4)
NEUTROPHILS # BLD AUTO: 14.24 K/UL (ref 2–7.15)
NEUTROPHILS NFR BLD: 93.7 % (ref 44–72)
NITRITE UR QL STRIP.AUTO: NEGATIVE
NRBC # BLD AUTO: 0 K/UL
NRBC BLD-RTO: 0 /100 WBC
PH UR STRIP.AUTO: 5 [PH] (ref 5–8)
PLATELET # BLD AUTO: 269 K/UL (ref 164–446)
PMV BLD AUTO: 9 FL (ref 9–12.9)
POTASSIUM SERPL-SCNC: 3.8 MMOL/L (ref 3.6–5.5)
PROT SERPL-MCNC: 6.8 G/DL (ref 6–8.2)
PROT UR QL STRIP: NEGATIVE MG/DL
PROTHROMBIN TIME: 13.5 SEC (ref 12–14.6)
RBC # BLD AUTO: 4.72 M/UL (ref 4.2–5.4)
RBC # URNS HPF: ABNORMAL /HPF
RBC UR QL AUTO: ABNORMAL
RSV RNA SPEC QL NAA+PROBE: NEGATIVE
SARS-COV-2 RNA RESP QL NAA+PROBE: NOTDETECTED
SODIUM SERPL-SCNC: 136 MMOL/L (ref 135–145)
SP GR UR STRIP.AUTO: <=1.005
SPECIMEN SOURCE: NORMAL
WBC # BLD AUTO: 15.2 K/UL (ref 4.8–10.8)
WBC #/AREA URNS HPF: ABNORMAL /HPF

## 2023-05-12 PROCEDURE — 0241U HCHG SARS-COV-2 COVID-19 NFCT DS RESP RNA 4 TRGT MIC: CPT

## 2023-05-12 PROCEDURE — 87324 CLOSTRIDIUM AG IA: CPT

## 2023-05-12 PROCEDURE — 87040 BLOOD CULTURE FOR BACTERIA: CPT | Mod: 91

## 2023-05-12 PROCEDURE — 99223 1ST HOSP IP/OBS HIGH 75: CPT | Mod: AI | Performed by: HOSPITALIST

## 2023-05-12 PROCEDURE — 770001 HCHG ROOM/CARE - MED/SURG/GYN PRIV*

## 2023-05-12 PROCEDURE — C9803 HOPD COVID-19 SPEC COLLECT: HCPCS | Performed by: STUDENT IN AN ORGANIZED HEALTH CARE EDUCATION/TRAINING PROGRAM

## 2023-05-12 PROCEDURE — 700105 HCHG RX REV CODE 258: Performed by: STUDENT IN AN ORGANIZED HEALTH CARE EDUCATION/TRAINING PROGRAM

## 2023-05-12 PROCEDURE — 96375 TX/PRO/DX INJ NEW DRUG ADDON: CPT

## 2023-05-12 PROCEDURE — A9270 NON-COVERED ITEM OR SERVICE: HCPCS | Performed by: STUDENT IN AN ORGANIZED HEALTH CARE EDUCATION/TRAINING PROGRAM

## 2023-05-12 PROCEDURE — 36415 COLL VENOUS BLD VENIPUNCTURE: CPT

## 2023-05-12 PROCEDURE — 700101 HCHG RX REV CODE 250: Performed by: INTERNAL MEDICINE

## 2023-05-12 PROCEDURE — 87493 C DIFF AMPLIFIED PROBE: CPT

## 2023-05-12 PROCEDURE — 74177 CT ABD & PELVIS W/CONTRAST: CPT

## 2023-05-12 PROCEDURE — 700111 HCHG RX REV CODE 636 W/ 250 OVERRIDE (IP): Performed by: STUDENT IN AN ORGANIZED HEALTH CARE EDUCATION/TRAINING PROGRAM

## 2023-05-12 PROCEDURE — 700111 HCHG RX REV CODE 636 W/ 250 OVERRIDE (IP): Performed by: HOSPITALIST

## 2023-05-12 PROCEDURE — 700102 HCHG RX REV CODE 250 W/ 637 OVERRIDE(OP): Performed by: STUDENT IN AN ORGANIZED HEALTH CARE EDUCATION/TRAINING PROGRAM

## 2023-05-12 PROCEDURE — 83690 ASSAY OF LIPASE: CPT

## 2023-05-12 PROCEDURE — 71045 X-RAY EXAM CHEST 1 VIEW: CPT

## 2023-05-12 PROCEDURE — 700111 HCHG RX REV CODE 636 W/ 250 OVERRIDE (IP): Performed by: INTERNAL MEDICINE

## 2023-05-12 PROCEDURE — 700117 HCHG RX CONTRAST REV CODE 255: Performed by: STUDENT IN AN ORGANIZED HEALTH CARE EDUCATION/TRAINING PROGRAM

## 2023-05-12 PROCEDURE — 99285 EMERGENCY DEPT VISIT HI MDM: CPT

## 2023-05-12 PROCEDURE — 85025 COMPLETE CBC W/AUTO DIFF WBC: CPT

## 2023-05-12 PROCEDURE — 83605 ASSAY OF LACTIC ACID: CPT

## 2023-05-12 PROCEDURE — 85610 PROTHROMBIN TIME: CPT

## 2023-05-12 PROCEDURE — 96365 THER/PROPH/DIAG IV INF INIT: CPT

## 2023-05-12 PROCEDURE — 80053 COMPREHEN METABOLIC PANEL: CPT

## 2023-05-12 PROCEDURE — 94760 N-INVAS EAR/PLS OXIMETRY 1: CPT

## 2023-05-12 PROCEDURE — 81001 URINALYSIS AUTO W/SCOPE: CPT

## 2023-05-12 PROCEDURE — 700105 HCHG RX REV CODE 258: Performed by: INTERNAL MEDICINE

## 2023-05-12 RX ORDER — ONDANSETRON 2 MG/ML
4 INJECTION INTRAMUSCULAR; INTRAVENOUS ONCE
Status: COMPLETED | OUTPATIENT
Start: 2023-05-12 | End: 2023-05-12

## 2023-05-12 RX ORDER — BISACODYL 10 MG
10 SUPPOSITORY, RECTAL RECTAL
Status: DISCONTINUED | OUTPATIENT
Start: 2023-05-12 | End: 2023-05-13 | Stop reason: HOSPADM

## 2023-05-12 RX ORDER — SODIUM CHLORIDE, SODIUM LACTATE, POTASSIUM CHLORIDE, AND CALCIUM CHLORIDE .6; .31; .03; .02 G/100ML; G/100ML; G/100ML; G/100ML
500 INJECTION, SOLUTION INTRAVENOUS
Status: DISCONTINUED | OUTPATIENT
Start: 2023-05-12 | End: 2023-05-13 | Stop reason: HOSPADM

## 2023-05-12 RX ORDER — ACETAMINOPHEN 325 MG/1
650 TABLET ORAL ONCE
Status: COMPLETED | OUTPATIENT
Start: 2023-05-12 | End: 2023-05-12

## 2023-05-12 RX ORDER — ONDANSETRON 4 MG/1
4 TABLET, ORALLY DISINTEGRATING ORAL EVERY 4 HOURS PRN
Status: DISCONTINUED | OUTPATIENT
Start: 2023-05-12 | End: 2023-05-13 | Stop reason: HOSPADM

## 2023-05-12 RX ORDER — VANCOMYCIN HYDROCHLORIDE 125 MG/1
125 CAPSULE ORAL SEE ADMIN INSTRUCTIONS
Status: ON HOLD | COMMUNITY
Start: 2023-04-27 | End: 2023-05-18

## 2023-05-12 RX ORDER — ACETAMINOPHEN 325 MG/1
650 TABLET ORAL EVERY 6 HOURS PRN
Status: DISCONTINUED | OUTPATIENT
Start: 2023-05-12 | End: 2023-05-13 | Stop reason: HOSPADM

## 2023-05-12 RX ORDER — HYDROMORPHONE HYDROCHLORIDE 1 MG/ML
0.5 INJECTION, SOLUTION INTRAMUSCULAR; INTRAVENOUS; SUBCUTANEOUS
Status: DISCONTINUED | OUTPATIENT
Start: 2023-05-12 | End: 2023-05-13 | Stop reason: HOSPADM

## 2023-05-12 RX ORDER — POLYETHYLENE GLYCOL 3350 17 G/17G
1 POWDER, FOR SOLUTION ORAL
Status: DISCONTINUED | OUTPATIENT
Start: 2023-05-12 | End: 2023-05-13 | Stop reason: HOSPADM

## 2023-05-12 RX ORDER — MORPHINE SULFATE 4 MG/ML
2 INJECTION INTRAVENOUS
Status: DISCONTINUED | OUTPATIENT
Start: 2023-05-12 | End: 2023-05-13 | Stop reason: HOSPADM

## 2023-05-12 RX ORDER — SODIUM CHLORIDE 9 MG/ML
INJECTION, SOLUTION INTRAVENOUS CONTINUOUS
Status: DISCONTINUED | OUTPATIENT
Start: 2023-05-12 | End: 2023-05-13 | Stop reason: HOSPADM

## 2023-05-12 RX ORDER — ONDANSETRON 2 MG/ML
4 INJECTION INTRAMUSCULAR; INTRAVENOUS EVERY 4 HOURS PRN
Status: DISCONTINUED | OUTPATIENT
Start: 2023-05-12 | End: 2023-05-13 | Stop reason: HOSPADM

## 2023-05-12 RX ORDER — AMOXICILLIN 250 MG
2 CAPSULE ORAL 2 TIMES DAILY
Status: DISCONTINUED | OUTPATIENT
Start: 2023-05-12 | End: 2023-05-13 | Stop reason: HOSPADM

## 2023-05-12 RX ORDER — SODIUM CHLORIDE, SODIUM LACTATE, POTASSIUM CHLORIDE, AND CALCIUM CHLORIDE .6; .31; .03; .02 G/100ML; G/100ML; G/100ML; G/100ML
1000 INJECTION, SOLUTION INTRAVENOUS ONCE
Status: COMPLETED | OUTPATIENT
Start: 2023-05-12 | End: 2023-05-12

## 2023-05-12 RX ADMIN — SODIUM CHLORIDE: 9 INJECTION, SOLUTION INTRAVENOUS at 13:55

## 2023-05-12 RX ADMIN — VANCOMYCIN HYDROCHLORIDE 125 MG: 500 INJECTION, POWDER, LYOPHILIZED, FOR SOLUTION INTRAVENOUS at 14:32

## 2023-05-12 RX ADMIN — FENTANYL CITRATE 50 MCG: 50 INJECTION, SOLUTION INTRAMUSCULAR; INTRAVENOUS at 02:16

## 2023-05-12 RX ADMIN — ACETAMINOPHEN 650 MG: 325 TABLET, FILM COATED ORAL at 01:58

## 2023-05-12 RX ADMIN — VANCOMYCIN HYDROCHLORIDE 125 MG: 500 INJECTION, POWDER, LYOPHILIZED, FOR SOLUTION INTRAVENOUS at 17:40

## 2023-05-12 RX ADMIN — ONDANSETRON HYDROCHLORIDE 4 MG: 2 SOLUTION INTRAMUSCULAR; INTRAVENOUS at 01:59

## 2023-05-12 RX ADMIN — ONDANSETRON HYDROCHLORIDE 4 MG: 2 SOLUTION INTRAMUSCULAR; INTRAVENOUS at 16:13

## 2023-05-12 RX ADMIN — IOHEXOL 100 ML: 350 INJECTION, SOLUTION INTRAVENOUS at 02:36

## 2023-05-12 RX ADMIN — ONDANSETRON HYDROCHLORIDE 4 MG: 2 SOLUTION INTRAMUSCULAR; INTRAVENOUS at 07:13

## 2023-05-12 RX ADMIN — PIPERACILLIN AND TAZOBACTAM 4.5 G: 4; .5 INJECTION, POWDER, FOR SOLUTION INTRAVENOUS at 03:14

## 2023-05-12 RX ADMIN — SODIUM CHLORIDE, POTASSIUM CHLORIDE, SODIUM LACTATE AND CALCIUM CHLORIDE 1000 ML: 600; 310; 30; 20 INJECTION, SOLUTION INTRAVENOUS at 01:59

## 2023-05-12 RX ADMIN — HYDROMORPHONE HYDROCHLORIDE 0.5 MG: 1 INJECTION, SOLUTION INTRAMUSCULAR; INTRAVENOUS; SUBCUTANEOUS at 21:07

## 2023-05-12 ASSESSMENT — LIFESTYLE VARIABLES
TOTAL SCORE: 0
ON A TYPICAL DAY WHEN YOU DRINK ALCOHOL HOW MANY DRINKS DO YOU HAVE: 2
EVER HAD A DRINK FIRST THING IN THE MORNING TO STEADY YOUR NERVES TO GET RID OF A HANGOVER: NO
EVER FELT BAD OR GUILTY ABOUT YOUR DRINKING: NO
CONSUMPTION TOTAL: NEGATIVE
TOTAL SCORE: 0
HAVE YOU EVER FELT YOU SHOULD CUT DOWN ON YOUR DRINKING: NO
HOW MANY TIMES IN THE PAST YEAR HAVE YOU HAD 5 OR MORE DRINKS IN A DAY: 0
AVERAGE NUMBER OF DAYS PER WEEK YOU HAVE A DRINK CONTAINING ALCOHOL: 3
HAVE PEOPLE ANNOYED YOU BY CRITICIZING YOUR DRINKING: NO
TOTAL SCORE: 0
ALCOHOL_USE: NO

## 2023-05-12 ASSESSMENT — COGNITIVE AND FUNCTIONAL STATUS - GENERAL
SUGGESTED CMS G CODE MODIFIER DAILY ACTIVITY: CH
DAILY ACTIVITIY SCORE: 24
SUGGESTED CMS G CODE MODIFIER MOBILITY: CH
MOBILITY SCORE: 24

## 2023-05-12 ASSESSMENT — ENCOUNTER SYMPTOMS
ABDOMINAL PAIN: 1
FEVER: 1
NAUSEA: 1
DIARRHEA: 1
PALPITATIONS: 0
BLURRED VISION: 0
CHILLS: 1
NECK PAIN: 0
VOMITING: 1
DEPRESSION: 0
DIZZINESS: 0
MYALGIAS: 0
INSOMNIA: 0
SHORTNESS OF BREATH: 0
BRUISES/BLEEDS EASILY: 0
SORE THROAT: 0
COUGH: 0
HEADACHES: 0
DOUBLE VISION: 0
WEAKNESS: 0

## 2023-05-12 ASSESSMENT — PAIN SCALES - PAIN ASSESSMENT IN ADVANCED DEMENTIA (PAINAD)
CONSOLABILITY: NO NEED TO CONSOLE
BREATHING: NORMAL
BODYLANGUAGE: RELAXED
BREATHING: NORMAL
TOTALSCORE: 0
FACIALEXPRESSION: SMILING OR INEXPRESSIVE

## 2023-05-12 ASSESSMENT — PAIN DESCRIPTION - PAIN TYPE
TYPE: ACUTE PAIN

## 2023-05-12 ASSESSMENT — FIBROSIS 4 INDEX
FIB4 SCORE: 1.14
FIB4 SCORE: 1.35

## 2023-05-12 NOTE — PROGRESS NOTES
4 Eyes Skin Assessment Completed by Ana Luisa RN and Tres RN.    Head WDL  Ears WDL  Nose WDL  Mouth WDL  Neck WDL  Breast/Chest WDL  Shoulder Blades WDL  Spine WDL  (R) Arm/Elbow/Hand WDL  (L) Arm/Elbow/Hand WDL  Abdomen WDL  Groin WDL  Scrotum/Coccyx/Buttocks WDL  (R) Leg WDL  (L) Leg WDL  (R) Heel/Foot/Toe WDL  (L) Heel/Foot/Toe WDL          Devices In Places SCD's      Interventions In Place Pillows    Possible Skin Injury No    Pictures Uploaded Into Epic N/A  Wound Consult Placed N/A  RN Wound Prevention Protocol Ordered No

## 2023-05-12 NOTE — DISCHARGE PLANNING
Care Transition Team Assessment    Information Source  Orientation Level: Oriented X4  Who is responsible for making decisions for patient? : Patient    Readmission Evaluation  Is this a readmission?: No    Elopement Risk  Legal Hold: No  Ambulatory or Self Mobile in Wheelchair: No-Not an Elopement Risk  Elopement Risk: Not at Risk for Elopement    Interdisciplinary Discharge Planning  Lives with - Patient's Self Care Capacity: Spouse, Adult Children  Patient or legal guardian wants to designate a caregiver: No  Support Systems: Spouse / Significant Other  Housing / Facility: [address]  Durable Medical Equipment: Not Applicable    Discharge Preparedness  What is your plan after discharge?: Home with help  What are your discharge supports?: Spouse, Child  Prior Functional Level: Ambulatory, Independent with Activities of Daily Living, Independent with Medication Management  Difficulity with ADLs: None  Difficulity with IADLs: None    Functional Assesment  Prior Functional Level: Ambulatory, Independent with Activities of Daily Living, Independent with Medication Management    Finances  Financial Barriers to Discharge: No  Prescription Coverage: Yes    Vision / Hearing Impairment  Vision Impairment : Yes  Right Eye Vision: Impaired, Wears Glasses  Left Eye Vision: Impaired, Wears Glasses  Hearing Impairment : No         Advance Directive  Advance Directive?: DPOA for Health Care  Durable Power of  Name and Contact : Amadou Winters (spouse) [phone]    Domestic Abuse  Have you ever been the victim of abuse or violence?: No  Physical Abuse or Sexual Abuse: No  Verbal Abuse or Emotional Abuse: No  Possible Abuse/Neglect Reported to:: Not Applicable    Psychological Assessment  History of Substance Abuse: Alcohol, Other (comment) (Cigarettes)  Date Last Used - Alcohol: current use  Substance Abuse Comments: Reports that she quit smoking about 10 years ago. Her smoking use included cigarettes. She has a 10.00  pack-year smoking history. She has never used smokeless tobacco. She reports current alcohol use. She reports that she does not use drugs.  History of Psychiatric Problems: Yes (Depression with Anxiety)  Non-compliant with Treatment: No  Newly Diagnosed Illness: No    Discharge Risks or Barriers  Discharge risks or barriers?: Complex medical needs, Substance abuse, Mental health  Patient risk factors: Complex medical needs, Mental health, Substance abuse, Vulnerable adult    Anticipated Discharge Information  Discharge Disposition: Discharged to home/self care (01)  Discharge Address: [address] [place] [address] [place] [address]  Discharge Contact Phone Number: [phone]    Case Management Discharge Planning    Admission Date: 5/12/2023  GMLOS: 3  ALOS: 0    6-Clicks ADL Score: 24  6-Clicks Mobility Score: 24      Anticipated Discharge Dispo: Discharge Disposition: Discharged to home/self care (01)  Discharge Address: [address] [place] [address] [place] [address]  Discharge Contact Phone Number: [phone]    DME Needed: No    Action(s) Taken: Updated Provider/Nurse on Discharge Plan and OTHER: RN CM discussed patient with IDT during morning rounds.Pending potential transfer to [facility] for additional treatment.    Escalations Completed: Provider and Bedside RN    Medically Clear: No    Next Steps: Continue to monitor for changes and update the discharge plan accordingly. Follow-up with the Attending and Bedside RN for any issues/concerns and assist as indicated.    Barriers to Discharge: Medical clearance and Pending Procedures    Is the patient up for discharge tomorrow: No

## 2023-05-12 NOTE — HOSPITAL COURSE
"Per notes, \"67 y.o. female, with h/o HTN, HLD , depression and multiple episodes of C. difficile colitis for the last 8 years, currently on chronic p.o. vancomycin and status post fecal transplant 3 times in the past, who presented to the emergency department on 5/12/2023 with complaints of fever, nausea, vomiting, worsening diarrhea and abdominal pain.  Her pain is mostly intermittent, ranging from 5-10/10 in intensity.  Mostly sharp in nature and diffusely in her abdomen.  She describes having nonbloody emesis and a \"explosive diarrhea\" last night.  She denies bloody stool.  At the time my evaluation, her pain was much improved after receiving fentanyl in the ED.     ER COURSE:  -Febrile, tachypneic and tachycardic.  -WBC 15.2, lactic acid 2.4.  -CT of the abdomen and pelvis showed colitis, inflammatory versus ischemic and also swirled appearance of the mesenteric vessels showing concern for partial or evolving cecal volvulus.  -Patient was started on Zosyn and ERP discussed the case with general surgery, Dr. Zuniga, who evaluated patient in the morning.\"  "

## 2023-05-12 NOTE — ED PROVIDER NOTES
ED Provider Note    CHIEF COMPLAINT  Chief Complaint   Patient presents with    N/V    Fever    Dizziness    Cough     Patient has having N/V, dizziness,  fever and cough for few days. Patient has hx of C diff last year on vancomycin. Patient was given zofran IV by the EMS.    Diarrhea       EXTERNAL RECORDS REVIEWED  Inpatient Notes patient has a history of C. difficile is on oral vancomycin reviewed CT abdomen pelvis December 26, 2022 patient did have noted colitis    HPI/ROS  LIMITATION TO HISTORY   Select: : None  OUTSIDE HISTORIAN(S):  None available    Kendra Tracey Winters is a 67 y.o. female who presents evaluation of diffuse abdominal pain with associated nausea vomiting beginning this evening.  Patient does admit to subjective fevers chills and a dry nonproductive cough for the past few days.  Has had associated loose watery diarrhea which she states has been present for the past year.  She has a history of recurrent C. difficile infections and is currently on oral vancomycin.  Does follow gastroenterology as an outpatient.  This evening she had worsening pain and began to vomit prompting her visit to the ER.    PAST MEDICAL HISTORY   has a past medical history of Adverse effect of anesthesia, Anesthesia, Arthritis, Bronchitis (2013), C. difficile colitis, C. difficile diarrhea (5/2013), Cancer (HCC) (2008), Cataract, Diverticulitis, High cholesterol, Hypertension, Insomnia related to another mental disorder, Pain, Pneumonia (2011), PONV (postoperative nausea and vomiting), Psychiatric disturbance, and ULCERATIVE COLITIS.    SURGICAL HISTORY   has a past surgical history that includes other orthopedic surgery; tonsillectomy; orif, wrist (9/5/2013); fecal transplant (11/05/2013); knee arthroplasty total (9/26/2014); hemorrhoidectomy; and total knee arthroplasty (Left, 5/31/2019).    FAMILY HISTORY  Family History   Problem Relation Age of Onset    Cancer Mother 48        breast    Cancer Maternal  "Grandmother     Hypertension Father     Heart Disease Father        SOCIAL HISTORY  Social History     Tobacco Use    Smoking status: Former     Packs/day: 0.50     Years: 20.00     Pack years: 10.00     Types: Cigarettes     Quit date: 2/20/2013     Years since quitting: 10.2    Smokeless tobacco: Never    Tobacco comments:     2 cig /week   Substance and Sexual Activity    Alcohol use: Yes     Comment: 2-3 per week    Drug use: No    Sexual activity: Not Currently       CURRENT MEDICATIONS  Home Medications       Reviewed by Nrom Walker R.N. (Registered Nurse) on 05/12/23 at 0112  Med List Status: Not Addressed     Medication Last Dose Status   atorvastatin (LIPITOR) 20 MG Tab  Active   lisinopril (PRINIVIL) 10 MG Tab  Active   ondansetron (ZOFRAN ODT) 4 MG TABLET DISPERSIBLE  Active   ondansetron (ZOFRAN ODT) 4 MG TABLET DISPERSIBLE  Active   sertraline (ZOLOFT) 50 MG Tab  Active   zolpidem (AMBIEN) 5 MG Tab  Active                    ALLERGIES  Allergies   Allergen Reactions    Latex Anaphylaxis, Shortness of Breath and Swelling       PHYSICAL EXAM  VITAL SIGNS: /73   Pulse (!) 123   Temp 37.6 °C (99.7 °F) (Temporal)   Resp (!) 21   Ht 1.626 m (5' 4\")   Wt 58.1 kg (128 lb)   SpO2 94%   BMI 21.97 kg/m²      Pulse ox interpretation: I interpret this pulse ox as normal.  VITALS - vital signs documented prior to this note have been reviewed and noted,  GENERAL - awake, alert, oriented, GCS 15, no apparent distress, non-toxic  appearing  HEENT - normocephalic, atraumatic, pupils equal, sclera anicteric, mucus  membranes moist  NECK - supple, no meningismus, full active range of motion, trachea midline  CARDIOVASCULAR - regular rate/rhythm, no murmurs/gallops/rubs  PULMONARY - no respiratory distress, speaking in full sentences, clear to  auscultation bilaterally, no wheezing/ronchi/rales, no accessory muscle use  GASTROINTESTINAL -tenderness with palpation of the left lower and right upper quadrant " no rebound guarding or peritonitis  GENITOURINARY - Deferred  NEUROLOGIC - Awake alert, normal mental status, speech fluid, cognition  normal, moves all extremities  MUSCULOSKELETAL - no obvious asymmetry or deformities present  EXTREMITIES - warm, well-perfused, no cyanosis or significant edema  DERMATOLOGIC - warm, dry, no rashes, no jaundice  PSYCHIATRIC - normal affect, normal insight, normal concentration    DIAGNOSTIC STUDIES / PROCEDURES    LABS  Labs Reviewed   CBC WITH DIFFERENTIAL - Abnormal; Notable for the following components:       Result Value    WBC 15.2 (*)     MCHC 33.3 (*)     Neutrophils-Polys 93.70 (*)     Lymphocytes 3.60 (*)     Neutrophils (Absolute) 14.24 (*)     Lymphs (Absolute) 0.55 (*)     All other components within normal limits    Narrative:     Special Contact Isolation   COMP METABOLIC PANEL - Abnormal; Notable for the following components:    Co2 17 (*)     All other components within normal limits    Narrative:     Special Contact Isolation   LACTIC ACID - Abnormal; Notable for the following components:    Lactic Acid 2.4 (*)     All other components within normal limits    Narrative:     Special Contact Isolation   URINALYSIS - Abnormal; Notable for the following components:    Ketones 15 (*)     Leukocyte Esterase Trace (*)     Occult Blood Small (*)     All other components within normal limits    Narrative:     Special Contact Isolation   URINE MICROSCOPIC (W/UA) - Abnormal; Notable for the following components:    Bacteria Few (*)     All other components within normal limits    Narrative:     Special Contact Isolation   LACTIC ACID    Narrative:     Special Contact Isolation   LIPASE    Narrative:     Special Contact Isolation   COV-2, FLU A/B, AND RSV BY PCR ([employer])    Narrative:     Special Contact Isolation   CORRECTED CALCIUM    Narrative:     Special Contact Isolation   ESTIMATED GFR    Narrative:     Special Contact Isolation   PROTHROMBIN TIME   LACTIC ACID   BLOOD  "CULTURE    Narrative:     Special Contact Isolation  1 of 2 for Blood Culture x 2 sites order. Per Hospital  Policy: Only change Specimen Src: to \"Line\" if specified by  physician order.   BLOOD CULTURE    Narrative:     Special Contact Isolation  2 of 2 blood culture x2  Sites order. Per Hospital Policy:  Only change Specimen Src: to \"Line\" if specified by physician  order.   CDIFF BY PCR RFLX TOXIN    Narrative:     Special Contact Isolation  Does this patient have risk factors for C-diff?->Yes   LACTIC ACID   Remarkable for leukocytosis with left shift mildly elevated lactic acid      RADIOLOGY  I have independently interpreted the diagnostic imaging associated with this visit and am waiting the final reading from the radiologist.   My preliminary interpretation is as follows: Concerning for colitis chest x-ray shows no acute cardiopulmonary process  Radiologist interpretation:   CT-ABDOMEN-PELVIS WITH   Final Result         1.  Colonic wall thickening and pericolonic fat stranding of the left colon favoring colitis. Consider inflammatory or infectious versus ischemic etiologies in the appropriate clinical setting.   2.  Swirled appearance of the mesenteric vessels and rotated appearance of the bowel in the right upper quadrant with cecum in the right upper quadrant anterior to the liver. Appearance raising concern for partial or evolving cecal volvulus. However    there are no visualized obstructive changes at this time.   3.  Atherosclerosis      DX-CHEST-PORTABLE (1 VIEW)   Final Result         1.  No acute cardiopulmonary disease.           COURSE & MEDICAL DECISION MAKING    ED Observation Status? Yes; I am placing the patient in to an observation status due to a diagnostic uncertainty as well as therapeutic intensity. Patient placed in observation status at 1:47 AM, 5/12/2023.     Observation plan is as follows: We will treat for possible sepsis, order CT abdomen pelvis for evaluation of acute " intra-abdominal pathology may require admission for further care and evaluation.    Upon Reevaluation, the patient's condition has: not improved; and will be escalated to hospitalization.    Patient discharged from ED Observation status at 0255 (Time) 5/12 (Date).     Critical care    Critical Care Procedure Note    Total critical care time: Approximately 36 minutes    Upon my assess due to a high probability of clinically significant, life threatening deterioration, secondary to sepsis the patient required my direct attention and intervention. This critical care time included obtaining a history; examining the patient; pulse oximetry; ordering and review of studies; arranging urgent treatment with development of a management plan; evaluation of patient's response to treatment; frequent reassessment; and, discussions with other providers.    was exclusive of separately billable procedures and treating other patients and teaching time.      INITIAL ASSESSMENT, COURSE AND PLAN  Differential diagnoses include was not limited to sepsis electrolyte abnormality dehydration urinary tract infection pyelonephritis small bowel obstruction volvulus diverticulitis intra-abdominal abscess colitis enteritis pneumonia among many other considerations.    Care Narrative: Patient presented for evaluation of a dry cough as well as abdominal pain with nausea vomiting.  On examination she does have left lower quadrant tenderness and right upper quadrant tenderness.  She is nonperitoneal.  She was noted to be febrile and tachycardic thus a sepsis work-up was initiated.  Labs were obtained and IV was established she was bolused with IV fluids no obvious source of infection thus antibiotics were withheld.  Labs did result showing a leukocytosis as well as a left shift.  Are otherwise nonactionable.  Chest x-ray showed no evidence of pneumonia CT abdomen pelvis was obtained which did show diffuse colonic wall thickening as well as  pericolonic fat stranding concerning for colitis, at this point patient was given a dose of Zosyn for possible intra-abdominal source of her sepsis syndrome.  CT was also concerning for a possible evolving cecal volvulus thus general surgery was paged did speak with Dr. Nicole recommended medicine admission agreed to evaluate the patient. Did speak with Dr. Barajas who graciously agreed to accept the patient to her service She was admitted in stable but serious condition  HYDRATION: Based on the patient's presentation of Dehydration the patient was given IV fluids. IV Hydration was used because oral hydration was not adequate alone. Upon recheck following hydration, the patient was improved.      ADDITIONAL PROBLEM LIST  Nausea vomiting dehydration abdominal pain  DISPOSITION AND DISCUSSIONS  I have discussed management of the patient with the following physicians and HOSEA's:  dr ravin nicole    Discussion of management with other [facility] or appropriate source(s): None       Decision tools and prescription drugs considered including, but not limited to: Antibiotics   .    FINAL DIAGNOSIS  #1 sepsis   #2 cecal volvulus   #3 nausea vomiting   #4 dehydration   #5 colitis       Electronically signed by: Peter Sanchez D.O., 5/12/2023 1:49 AM

## 2023-05-12 NOTE — ED TRIAGE NOTES
".  Chief Complaint   Patient presents with    N/V    Fever    Dizziness    Cough     Patient has having N/V, dizziness,  fever and cough for few days. Patient has hx of C diff last year on vancomycin.     .BP (!) 155/61   Pulse (!) 118   Temp (!) 38.4 °C (101.2 °F)   Resp (!) 22   Ht 1.626 m (5' 4\")   Wt 58.1 kg (128 lb)   SpO2 97%   BMI 21.97 kg/m²       "

## 2023-05-12 NOTE — ASSESSMENT & PLAN NOTE
"-Inpatient status on medical floor with remote cardiac monitoring.  -Patient reports \"explosive diarrhea \"last night.  Underlying history of chronic C. difficile colitis as described on HPI.  -CT showing swirled appearance of the mesenteric vessels and there was a concern of partial or evolving cecal volvulus.  -Strict NPO.  -Pain control as needed.  -I appreciate general surgery consult and recommendations.  ERP discussed with Dr. Zuniga  "

## 2023-05-12 NOTE — PROGRESS NOTES
ISOLATION PRECAUTIONS EDUCATION    Educated PATIENT, FAMILY, S.O: patient and family member on isolation for C DIFFICILE.    Educated on reason for isolation, how the infection may be transmitted, and how to help prevent transmission to others. Educated precautions involves staff and visitors wearing PPE, following Standard Precautions and performing meticulous hand hygiene in order to prevent transmission of infection.     Special Contact Precautions: Educated that Special Contact Precautions involves staff and visitors wearing gowns and gloves when in the patient room, and using soap and water for hand hygiene when exiting patient’s room.     Educated that patient may leave the room if they or continent of stool, but prior to exiting the patient room each time, the patient needs to have on a fresh patient gown, ensure the potentially infectious area is covered, and perform hand hygiene with soap and water immediately prior to exiting the room.    In addition, educated that alcohol based hand rub is NOT sufficient for hand hygiene for Special Contact Precautions. A bonnet is placed over the hand  dispenser inside the patients’ room as a reminder to wash hands with soap and water.     Patient transport and mobilization on unit  Educated that they may leave their room, but prior to exiting, the patient needs to have on a fresh patient gown, ensure the potentially infectious area is covered, performing appropriate hand hygiene immediately prior to exiting the room.

## 2023-05-12 NOTE — CARE PLAN
The patient is Stable - Low risk of patient condition declining or worsening    Shift Goals  Clinical Goals: Frequency of bowel movements will decrease, managed nausea, pain control 3/10 or less  Patient Goals: pain control 3/10 or less    Progress made toward(s) clinical / shift goals:  less bowel movements during this shift    Patient is not progressing towards the following goals:

## 2023-05-12 NOTE — PROGRESS NOTES
Received bedside report from night RN. Patient alert and oriented. Complains of nausea managed per MAR. Discussed plan of care and understood. Assessment per [facility]. Falls precaution in place. Call light placed within reach. Will continue to monitor.

## 2023-05-12 NOTE — PROGRESS NOTES
Med rec completed   Allergies reviewed     Pt is on a tapering course of Vancomycin that started on 4/27/2023  125 mg 4 times a day for 7 days   125 mg twice a day for 7 days   125 mg daily for 7 days    125 mg every other day for 7 days   125 mg every 72 hours for 7 days

## 2023-05-12 NOTE — ASSESSMENT & PLAN NOTE
SIRS criteria identified on my evaluation include:  Fever, with temperature greater than 100.9 deg F, Tachycardia, with heart rate greater than 90 BPM and Leukocytosis, with WBC greater than 12,000  Likely due to underlying colitis.  She was a started on antibiotic therapy.

## 2023-05-12 NOTE — ASSESSMENT & PLAN NOTE
CT of the abdomen show findings consistent with colitis.  -She has history of chronic C. difficile colitis for the last 8 years.  Status post fecal transplantation's x3 and on chronic p.o. vancomycin for the last year.  -Patient presenting with SIRS criteria, fever, elevated WBC at 15.2 and tachypneic.  She was a started on Zosyn in the emergency department which we will continue.  -Underlying C. difficile not excluded and she also has CT findings concerning for partial or evolving cecal volvulus.  -I appreciate general surgery consult and recommendation.  -Patient follows with [facility] in [place] for her underlying chronic C. difficile colitis.

## 2023-05-12 NOTE — PROGRESS NOTES
Temp spike 101.1, patient has PRN Acetaminophen tablets for fever, on strict NPO. MD informed. Managed with cold packs.

## 2023-05-12 NOTE — CONSULTS
"ATSP by Dr Sanchez for \"partial cecal volvulus\"    HPI: 67y F presetns with abdominal pain, nausea and emesis for the past few days.  She has been having profuse watery diarrhea and she has a hx of recurrent C-Diff colitis over the last few weeks.  She is currently on oral vancomycin and she has been hoping to get a fecal transplant given her recurrent disease.  On workup in the ED she was found to have a partially displaced cecum above the liver raising concern for a partial volvulus.  She has not had any prior bowel resections and she monzon snot take any blood thinners or immune suppressants at this time.      PMHx: Arthritis, C-Diff Colitis, Cataracts, Diverticulitis, HLD, HTN, Insomnia, Colitis    PSHx: Orthopedic procedures, Tonsillectomy    Meds: see Med Rec, no anticoagulation    All: Latex    FamHx: no colon/rectal cancers, no other pertinent family history    SocHx: Former but not current smoker with 10pack/year hx, drinks 1-2 per week      ROS: negative except as above    Consitutional- above  HEENT- no visual changes, no sneezing or runny nose  Skin- no rashes or itching  Cardiovascular- no chest pain or palpatations  Respiratory- no SOB or cough  GI- above  - no dysurea  Neuro- no weakness or syncope  Musculoskeletal- no muscle or joint pain  Heme- no bleeding or bruising  Lymphatic- no enlarged nodes or previous splenectomy  Endocrine- No sweating or heat/cold intolerance  Allergy- No asthma or hives  Psychiatric- no depression or anxiety        Physical Exam:   AFVSS  A@O x3, NAD  NCAT, no scleral icterus  Neck nontender, no lymphadenopathy  Normal respiratory effort, no chest wall masses  RRR, 2+ pulses  Abdomen soft, no peritonitis, no masses, mildly distended  Extremities warm and well perfused  No skin rashes or lesions    Labs: WBC 15.2, Hct 41, Plt 269, lizbeth 94  Lytes wnl, Lactic Acid 1.5    Radiology: CT A/P: 1.  Colonic wall thickening and pericolonic fat stranding of the left colon favoring " colitis. Consider inflammatory or infectious versus ischemic etiologies in the appropriate clinical setting.  2.  Swirled appearance of the mesenteric vessels and rotated appearance of the bowel in the right upper quadrant with cecum in the right upper quadrant anterior to the liver. Appearance raising concern for partial or evolving cecal volvulus. However   there are no visualized obstructive changes at this time.  3.  Atherosclerosis    A/P: 67y F with C-Diff colitis.  Appearance on CT is more consistent with cecal bascule than a true volvulus.  She does not have any peritonitis or need for emergent surgery.  She is not obstructed in light of the ongoing C-Diff with diarrhea.  Would favor medical management for now. Surgery (Jac[facility]) will follow along with primary team.

## 2023-05-12 NOTE — H&P
"Hospital Medicine History & Physical Note    Date of Service  5/12/2023    Primary Care Physician  Santa Otto M.D.    Consultants  general surgery    Specialist Names: ERP discussed with Dr. Zuniga    Code Status  Full Code    Chief Complaint  Chief Complaint   Patient presents with    N/V    Fever    Dizziness    Cough     Patient has having N/V, dizziness,  fever and cough for few days. Patient has hx of C diff last year on vancomycin. Patient was given zofran IV by the EMS.    Diarrhea       History of Presenting Illness  Kendra Winters is a 67 y.o. female, with h/o HTN, HLD , depression and multiple episodes of C. difficile colitis for the last 8 years, currently on chronic p.o. vancomycin and status post fecal transplant 3 times in the past, who presented to the emergency department on 5/12/2023 with complaints of fever, nausea, vomiting, worsening diarrhea and abdominal pain.  Her pain is mostly intermittent, ranging from 5-10/10 in intensity.  Mostly sharp in nature and diffusely in her abdomen.  She describes having nonbloody emesis and a \"explosive diarrhea\" last night.  She denies bloody stool.  At the time my evaluation, her pain was much improved after receiving fentanyl in the ED.    ER COURSE:  -Febrile, tachypneic and tachycardic.  -WBC 15.2, lactic acid 2.4.  -CT of the abdomen and pelvis showed colitis, inflammatory versus ischemic and also swirled appearance of the mesenteric vessels showing concern for partial or evolving cecal volvulus.  -Patient was started on Zosyn and ERP discussed the case with general surgery, Dr. Zuniga, who evaluated patient in the morning.    I discussed the plan of care with patient and ERP Dr. Sanchez .    Review of Systems  Review of Systems   Constitutional:  Positive for chills, fever and malaise/fatigue.   HENT:  Negative for congestion and sore throat.    Eyes:  Negative for blurred vision and double vision.   Respiratory:  Negative for cough " and shortness of breath.    Cardiovascular:  Negative for chest pain and palpitations.   Gastrointestinal:  Positive for abdominal pain, diarrhea, nausea and vomiting.   Genitourinary:  Negative for dysuria and urgency.   Musculoskeletal:  Negative for myalgias and neck pain.   Skin:  Negative for itching and rash.   Neurological:  Negative for dizziness, weakness and headaches.   Endo/Heme/Allergies:  Does not bruise/bleed easily.   Psychiatric/Behavioral:  Negative for depression. The patient does not have insomnia.        Past Medical History   has a past medical history of Adverse effect of anesthesia, Anesthesia, Arthritis, Bronchitis (2013), C. difficile colitis, C. difficile diarrhea (5/2013), Cancer (HCC) (2008), Cataract, Diverticulitis, High cholesterol, Hypertension, Insomnia related to another mental disorder, Pain, Pneumonia (2011), PONV (postoperative nausea and vomiting), Psychiatric disturbance, and ULCERATIVE COLITIS.    Surgical History   has a past surgical history that includes other orthopedic surgery; tonsillectomy; orif, wrist (9/5/2013); fecal transplant (11/05/2013); knee arthroplasty total (9/26/2014); hemorrhoidectomy; and pr total knee arthroplasty (Left, 5/31/2019).     Family History  family history includes Cancer in her maternal grandmother; Cancer (age of onset: 48) in her mother; Heart Disease in her father; Hypertension in her father.   Family history reviewed with patient. There is no family history that is pertinent to the chief complaint.     Social History   reports that she quit smoking about 10 years ago. Her smoking use included cigarettes. She has a 10.00 pack-year smoking history. She has never used smokeless tobacco. She reports current alcohol use. She reports that she does not use drugs.    Allergies  Allergies   Allergen Reactions    Latex Anaphylaxis, Shortness of Breath and Swelling       Medications  Prior to Admission Medications   Prescriptions Last Dose Informant  Patient Reported? Taking?   atorvastatin (LIPITOR) 20 MG Tab  Patient Yes No   Sig: Take 20 mg by mouth every evening.   lisinopril (PRINIVIL) 10 MG Tab  Patient Yes No   Sig: Take 10 mg by mouth every evening.   ondansetron (ZOFRAN ODT) 4 MG TABLET DISPERSIBLE  Patient No No   Sig: Take 1 Tablet by mouth every 8 hours as needed for Nausea.   ondansetron (ZOFRAN ODT) 4 MG TABLET DISPERSIBLE   No No   Sig: Take 1 Tablet by mouth every 8 hours as needed for Nausea/Vomiting.   sertraline (ZOLOFT) 50 MG Tab  Patient Yes No   Sig: Take 50 mg by mouth every evening.   zolpidem (AMBIEN) 5 MG Tab  Patient Yes No   Sig: Take 5 mg by mouth at bedtime.      Facility-Administered Medications: None       Physical Exam  Temp:  [37.6 °C (99.7 °F)-38.4 °C (101.2 °F)] 37.6 °C (99.7 °F)  Pulse:  [107-123] 107  Resp:  [16-22] 16  BP: (126-155)/(61-81) 127/74  SpO2:  [94 %-97 %] 96 %  Blood Pressure : 127/74   Temperature: 37.6 °C (99.7 °F)   Pulse: (!) 107   Respiration: 16   Pulse Oximetry: 96 %       Physical Exam  Constitutional:       Appearance: Normal appearance.   HENT:      Head: Normocephalic and atraumatic.      Mouth/Throat:      Mouth: Mucous membranes are moist.      Pharynx: Oropharynx is clear.   Eyes:      Extraocular Movements: Extraocular movements intact.      Pupils: Pupils are equal, round, and reactive to light.   Cardiovascular:      Rate and Rhythm: Normal rate and regular rhythm.      Heart sounds: Normal heart sounds.   Pulmonary:      Effort: Pulmonary effort is normal.      Breath sounds: Normal breath sounds.   Abdominal:      General: Abdomen is flat. Bowel sounds are normal. There is no distension.      Palpations: Abdomen is soft.      Tenderness: There is abdominal tenderness (Mild diffuse pain to palpation). There is no guarding or rebound.   Musculoskeletal:      Cervical back: Normal range of motion and neck supple.   Skin:     General: Skin is warm and dry.   Neurological:      General: No focal  deficit present.      Mental Status: She is alert and oriented to person, place, and time.   Psychiatric:         Mood and Affect: Mood normal.         Behavior: Behavior normal.         Laboratory:  Recent Labs     05/12/23  0120   WBC 15.2*   RBC 4.72   HEMOGLOBIN 13.8   HEMATOCRIT 41.4   MCV 87.7   MCH 29.2   MCHC 33.3*   RDW 41.8   PLATELETCT 269   MPV 9.0     Recent Labs     05/12/23  0120   SODIUM 136   POTASSIUM 3.8   CHLORIDE 104   CO2 17*   GLUCOSE 86   BUN 14   CREATININE 0.66   CALCIUM 9.0     Recent Labs     05/12/23  0120   ALTSGPT 18   ASTSGOT 23   ALKPHOSPHAT 86   TBILIRUBIN 0.5   LIPASE 22   GLUCOSE 86         No results for input(s): NTPROBNP in the last 72 hours.      No results for input(s): TROPONINT in the last 72 hours.    Imaging:  CT-ABDOMEN-PELVIS WITH   Final Result         1.  Colonic wall thickening and pericolonic fat stranding of the left colon favoring colitis. Consider inflammatory or infectious versus ischemic etiologies in the appropriate clinical setting.   2.  Swirled appearance of the mesenteric vessels and rotated appearance of the bowel in the right upper quadrant with cecum in the right upper quadrant anterior to the liver. Appearance raising concern for partial or evolving cecal volvulus. However    there are no visualized obstructive changes at this time.   3.  Atherosclerosis      DX-CHEST-PORTABLE (1 VIEW)   Final Result         1.  No acute cardiopulmonary disease.          X-Ray:  I have personally reviewed the images and compared with prior images.    Assessment/Plan:  Justification for Admission Status  I anticipate this patient will require at least two midnights for appropriate medical management, necessitating inpatient admission because 67-year-old female, coming with sepsis and CT findings of colitis and concern for partial or evolving cecal volvulus.  Prior history of chronic C. difficile colitis, she takes p.o. vancomycin daily for the last year.      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  -Patient reports \"explosive diarrhea \"last night.  Underlying history of chronic C. difficile colitis as described on HPI.  -CT showing swirled appearance of the mesenteric vessels and there was a concern of partial or evolving cecal volvulus.  -Strict NPO.  -Pain control as needed.  -I appreciate general surgery consult and recommendations.  ERP discussed with Dr. Zuniga    Colitis  Assessment & Plan  CT of the abdomen show findings consistent with colitis.  -She has history of chronic C. difficile colitis for the last 8 years.  Status post fecal transplantation's x3 and on chronic p.o. vancomycin for the last year.  -Patient presenting with SIRS criteria, fever, elevated WBC at 15.2 and tachypneic.  She was a started on Zosyn in the emergency department which we will continue.  -Underlying C. difficile not excluded and she also has CT findings concerning for partial or evolving cecal volvulus.  -I appreciate general surgery consult and recommendation.  -Patient follows with GI in [place] for her underlying chronic C. difficile colitis.    SIRS (systemic inflammatory response syndrome) (HCC)  Assessment & Plan  SIRS criteria identified on my evaluation include:  Fever, with temperature greater than 100.9 deg F, Tachycardia, with heart rate greater than 90 BPM and Leukocytosis, with WBC greater than 12,000  Likely due to underlying colitis.  She was a started on antibiotic therapy.      Hypercholesteremia- (present on admission)  Assessment & Plan  -On atorvastatin.    Essential hypertension- (present on admission)  Assessment & Plan  -She takes lisinopril.    Depression with anxiety- (present on admission)  Assessment & Plan  -She takes sertraline.        VTE prophylaxis: SCDs/TEDs  "

## 2023-05-12 NOTE — PROGRESS NOTES
"Hospital Medicine Daily Progress Note    Date of Service  5/12/2023    Chief Complaint  Kendra Winters is a 67 y.o. female admitted 5/12/2023 with diarrhea     Hospital Course  Per notes, \"67 y.o. female, with h/o HTN, HLD , depression and multiple episodes of C. difficile colitis for the last 8 years, currently on chronic p.o. vancomycin and status post fecal transplant 3 times in the past, who presented to the emergency department on 5/12/2023 with complaints of fever, nausea, vomiting, worsening diarrhea and abdominal pain.  Her pain is mostly intermittent, ranging from 5-10/10 in intensity.  Mostly sharp in nature and diffusely in her abdomen.  She describes having nonbloody emesis and a \"explosive diarrhea\" last night.  She denies bloody stool.  At the time my evaluation, her pain was much improved after receiving fentanyl in the ED.     ER COURSE:  -Febrile, tachypneic and tachycardic.  -WBC 15.2, lactic acid 2.4.  -CT of the abdomen and pelvis showed colitis, inflammatory versus ischemic and also swirled appearance of the mesenteric vessels showing concern for partial or evolving cecal volvulus.  -Patient was started on Zosyn and ERP discussed the case with general surgery, Dr. Zuniga, who evaluated patient in the morning.\"    Interval Problem Update  Patient still having abdominal pain with diarrhea, and fever    Per general surgery, no need for surgical management at this point, continue with medical management  Discussed with gastroenterology at [facility] for possible stool transplant, recommended formal consultation with gastroenterology at [facility] given patient's complicated history, and additional transplant may not be the best option at this time.  Gastroenterology consulted, pending further recommendations    I have discussed this patient's plan of care and discharge plan at IDT rounds today with Case Management, Nursing, Nursing leadership, and other members of the IDT " team.    Consultants/Specialty  GI    Code Status  Full Code    Disposition  The patient is not medically cleared for discharge to home or a post-acute facility.  Anticipate discharge to: home with close outpatient follow-up    I have placed the appropriate orders for post-discharge needs.    Review of Systems  Review of Systems   Constitutional:  Positive for fever and malaise/fatigue.   Gastrointestinal:  Positive for abdominal pain, diarrhea and vomiting.   All other systems reviewed and are negative.       Physical Exam  Temp:  [37.6 °C (99.7 °F)-38.4 °C (101.2 °F)] 38.4 °C (101.1 °F)  Pulse:  [100-123] 100  Resp:  [16-23] 18  BP: ()/(56-81) 99/58  SpO2:  [90 %-97 %] 90 %    Physical Exam  Vitals and nursing note reviewed.   Constitutional:       Appearance: Normal appearance.   Cardiovascular:      Rate and Rhythm: Normal rate and regular rhythm.      Pulses: Normal pulses.      Heart sounds: Normal heart sounds.   Pulmonary:      Effort: Pulmonary effort is normal.      Breath sounds: Normal breath sounds.   Abdominal:      General: Abdomen is flat. Bowel sounds are normal.      Palpations: Abdomen is soft.   Neurological:      General: No focal deficit present.      Mental Status: She is alert and oriented to person, place, and time. Mental status is at baseline.         Fluids    Intake/Output Summary (Last 24 hours) at 5/12/2023 1214  Last data filed at 5/12/2023 0415  Gross per 24 hour   Intake 1100 ml   Output --   Net 1100 ml       Laboratory  Recent Labs     05/12/23  0120   WBC 15.2*   RBC 4.72   HEMOGLOBIN 13.8   HEMATOCRIT 41.4   MCV 87.7   MCH 29.2   MCHC 33.3*   RDW 41.8   PLATELETCT 269   MPV 9.0     Recent Labs     05/12/23  0120   SODIUM 136   POTASSIUM 3.8   CHLORIDE 104   CO2 17*   GLUCOSE 86   BUN 14   CREATININE 0.66   CALCIUM 9.0     Recent Labs     05/12/23  0120   INR 1.04               Imaging  CT-ABDOMEN-PELVIS WITH   Final Result         1.  Colonic wall thickening and  "pericolonic fat stranding of the left colon favoring colitis. Consider inflammatory or infectious versus ischemic etiologies in the appropriate clinical setting.   2.  Swirled appearance of the mesenteric vessels and rotated appearance of the bowel in the right upper quadrant with cecum in the right upper quadrant anterior to the liver. Appearance raising concern for partial or evolving cecal volvulus. However    there are no visualized obstructive changes at this time.   3.  Atherosclerosis      DX-CHEST-PORTABLE (1 VIEW)   Final Result         1.  No acute cardiopulmonary disease.           Assessment/Plan  * Cecal volvulus (HCC)- (present on admission)  Assessment & Plan  -Inpatient status on medical floor with remote cardiac monitoring.  -Patient reports \"explosive diarrhea \"last night.  Underlying history of chronic C. difficile colitis as described on HPI.  -CT showing swirled appearance of the mesenteric vessels and there was a concern of partial or evolving cecal volvulus.  -Strict NPO.  -Pain control as needed.  -I appreciate general surgery consult and recommendations.  ERP discussed with Dr. Zuniga    Colitis  Assessment & Plan  CT of the abdomen show findings consistent with colitis.  -She has history of chronic C. difficile colitis for the last 8 years.  Status post fecal transplantation's x3 and on chronic p.o. vancomycin for the last year.  -Patient presenting with SIRS criteria, fever, elevated WBC at 15.2 and tachypneic.  She was a started on Zosyn in the emergency department which we will continue.  -Underlying C. difficile not excluded and she also has CT findings concerning for partial or evolving cecal volvulus.  -I appreciate general surgery consult and recommendation.  -Patient follows with [facility] in [place] for her underlying chronic C. difficile colitis.    SIRS (systemic inflammatory response syndrome) (HCC)  Assessment & Plan  SIRS criteria identified on my evaluation include:  Fever, " with temperature greater than 100.9 deg F, Tachycardia, with heart rate greater than 90 BPM and Leukocytosis, with WBC greater than 12,000  Likely due to underlying colitis.  She was a started on antibiotic therapy.      Hypercholesteremia- (present on admission)  Assessment & Plan  -On atorvastatin.    Essential hypertension- (present on admission)  Assessment & Plan  -She takes lisinopril.    Depression with anxiety- (present on admission)  Assessment & Plan  -She takes sertraline.         VTE prophylaxis: SCDs/TEDs    I have performed a physical exam and reviewed and updated ROS and Plan today (5/12/2023). In review of yesterday's note (5/11/2023), there are no changes except as documented above.

## 2023-05-12 NOTE — PROGRESS NOTES
Patient arrived to the floor via cart from ED.  Patient able to walk to bed with steady gait.  Telemetry monitor applied.  Patient up frequently to the bathroom to have loose stools. Patient oriented to room and call light.

## 2023-05-13 ENCOUNTER — HOSPITAL ENCOUNTER (INPATIENT)
Facility: MEDICAL CENTER | Age: 68
LOS: 5 days | DRG: 373 | End: 2023-05-18
Attending: FAMILY MEDICINE | Admitting: INTERNAL MEDICINE
Payer: COMMERCIAL

## 2023-05-13 VITALS
DIASTOLIC BLOOD PRESSURE: 62 MMHG | TEMPERATURE: 98.6 F | WEIGHT: 143.08 LBS | RESPIRATION RATE: 17 BRPM | HEIGHT: 64 IN | BODY MASS INDEX: 24.43 KG/M2 | OXYGEN SATURATION: 97 % | HEART RATE: 74 BPM | SYSTOLIC BLOOD PRESSURE: 105 MMHG

## 2023-05-13 LAB
ANION GAP SERPL CALC-SCNC: 14 MMOL/L (ref 7–16)
BUN SERPL-MCNC: 12 MG/DL (ref 8–22)
CALCIUM SERPL-MCNC: 8.4 MG/DL (ref 8.4–10.2)
CHLORIDE SERPL-SCNC: 106 MMOL/L (ref 96–112)
CO2 SERPL-SCNC: 15 MMOL/L (ref 20–33)
CREAT SERPL-MCNC: 0.57 MG/DL (ref 0.5–1.4)
ERYTHROCYTE [DISTWIDTH] IN BLOOD BY AUTOMATED COUNT: 45.3 FL (ref 35.9–50)
GFR SERPLBLD CREATININE-BSD FMLA CKD-EPI: 99 ML/MIN/1.73 M 2
GLUCOSE SERPL-MCNC: 72 MG/DL (ref 65–99)
HCT VFR BLD AUTO: 34.4 % (ref 37–47)
HGB BLD-MCNC: 11.2 G/DL (ref 12–16)
MCH RBC QN AUTO: 29.6 PG (ref 27–33)
MCHC RBC AUTO-ENTMCNC: 32.6 G/DL (ref 33.6–35)
MCV RBC AUTO: 91 FL (ref 81.4–97.8)
PLATELET # BLD AUTO: 190 K/UL (ref 164–446)
PMV BLD AUTO: 9.1 FL (ref 9–12.9)
POTASSIUM SERPL-SCNC: 3.4 MMOL/L (ref 3.6–5.5)
RBC # BLD AUTO: 3.78 M/UL (ref 4.2–5.4)
SODIUM SERPL-SCNC: 135 MMOL/L (ref 135–145)
WBC # BLD AUTO: 7.9 K/UL (ref 4.8–10.8)

## 2023-05-13 PROCEDURE — 85027 COMPLETE CBC AUTOMATED: CPT

## 2023-05-13 PROCEDURE — 700111 HCHG RX REV CODE 636 W/ 250 OVERRIDE (IP): Performed by: INTERNAL MEDICINE

## 2023-05-13 PROCEDURE — 770020 HCHG ROOM/CARE - TELE (206)

## 2023-05-13 PROCEDURE — 700101 HCHG RX REV CODE 250: Performed by: INTERNAL MEDICINE

## 2023-05-13 PROCEDURE — 700102 HCHG RX REV CODE 250 W/ 637 OVERRIDE(OP): Performed by: NURSE PRACTITIONER

## 2023-05-13 PROCEDURE — 36415 COLL VENOUS BLD VENIPUNCTURE: CPT

## 2023-05-13 PROCEDURE — 700105 HCHG RX REV CODE 258: Performed by: INTERNAL MEDICINE

## 2023-05-13 PROCEDURE — 99223 1ST HOSP IP/OBS HIGH 75: CPT | Mod: AI | Performed by: INTERNAL MEDICINE

## 2023-05-13 PROCEDURE — 80048 BASIC METABOLIC PNL TOTAL CA: CPT

## 2023-05-13 PROCEDURE — 94760 N-INVAS EAR/PLS OXIMETRY 1: CPT

## 2023-05-13 PROCEDURE — 700111 HCHG RX REV CODE 636 W/ 250 OVERRIDE (IP): Performed by: HOSPITALIST

## 2023-05-13 PROCEDURE — A9270 NON-COVERED ITEM OR SERVICE: HCPCS | Performed by: NURSE PRACTITIONER

## 2023-05-13 RX ORDER — ONDANSETRON 4 MG/1
4 TABLET, ORALLY DISINTEGRATING ORAL EVERY 4 HOURS PRN
Status: CANCELLED | OUTPATIENT
Start: 2023-05-13

## 2023-05-13 RX ORDER — LORAZEPAM 2 MG/ML
0.5 INJECTION INTRAMUSCULAR ONCE
Status: COMPLETED | OUTPATIENT
Start: 2023-05-13 | End: 2023-05-13

## 2023-05-13 RX ORDER — SODIUM CHLORIDE, SODIUM LACTATE, POTASSIUM CHLORIDE, AND CALCIUM CHLORIDE .6; .31; .03; .02 G/100ML; G/100ML; G/100ML; G/100ML
500 INJECTION, SOLUTION INTRAVENOUS
Status: DISCONTINUED | OUTPATIENT
Start: 2023-05-13 | End: 2023-05-18 | Stop reason: HOSPADM

## 2023-05-13 RX ORDER — MORPHINE SULFATE 4 MG/ML
2 INJECTION INTRAVENOUS
Status: DISCONTINUED | OUTPATIENT
Start: 2023-05-13 | End: 2023-05-18 | Stop reason: HOSPADM

## 2023-05-13 RX ORDER — BISACODYL 10 MG
10 SUPPOSITORY, RECTAL RECTAL
Status: DISCONTINUED | OUTPATIENT
Start: 2023-05-13 | End: 2023-05-13

## 2023-05-13 RX ORDER — MORPHINE SULFATE 4 MG/ML
2 INJECTION INTRAVENOUS
Status: CANCELLED | OUTPATIENT
Start: 2023-05-13

## 2023-05-13 RX ORDER — AMOXICILLIN 250 MG
2 CAPSULE ORAL 2 TIMES DAILY
Status: DISCONTINUED | OUTPATIENT
Start: 2023-05-13 | End: 2023-05-13

## 2023-05-13 RX ORDER — HYDROMORPHONE HYDROCHLORIDE 1 MG/ML
0.5 INJECTION, SOLUTION INTRAMUSCULAR; INTRAVENOUS; SUBCUTANEOUS
Status: CANCELLED | OUTPATIENT
Start: 2023-05-13

## 2023-05-13 RX ORDER — ONDANSETRON 4 MG/1
4 TABLET, ORALLY DISINTEGRATING ORAL EVERY 4 HOURS PRN
Status: DISCONTINUED | OUTPATIENT
Start: 2023-05-13 | End: 2023-05-18 | Stop reason: HOSPADM

## 2023-05-13 RX ORDER — POLYETHYLENE GLYCOL 3350 17 G/17G
1 POWDER, FOR SOLUTION ORAL
Status: CANCELLED | OUTPATIENT
Start: 2023-05-13

## 2023-05-13 RX ORDER — SODIUM CHLORIDE 9 MG/ML
INJECTION, SOLUTION INTRAVENOUS CONTINUOUS
Status: CANCELLED | OUTPATIENT
Start: 2023-05-13

## 2023-05-13 RX ORDER — ACETAMINOPHEN 325 MG/1
650 TABLET ORAL EVERY 6 HOURS PRN
Status: DISCONTINUED | OUTPATIENT
Start: 2023-05-13 | End: 2023-05-18 | Stop reason: HOSPADM

## 2023-05-13 RX ORDER — ONDANSETRON 2 MG/ML
4 INJECTION INTRAMUSCULAR; INTRAVENOUS EVERY 4 HOURS PRN
Status: CANCELLED | OUTPATIENT
Start: 2023-05-13

## 2023-05-13 RX ORDER — ACETAMINOPHEN 325 MG/1
650 TABLET ORAL EVERY 6 HOURS PRN
Status: CANCELLED | OUTPATIENT
Start: 2023-05-13

## 2023-05-13 RX ORDER — AMOXICILLIN 250 MG
2 CAPSULE ORAL 2 TIMES DAILY
Status: CANCELLED | OUTPATIENT
Start: 2023-05-13

## 2023-05-13 RX ORDER — SODIUM CHLORIDE 9 MG/ML
INJECTION, SOLUTION INTRAVENOUS CONTINUOUS
Status: DISCONTINUED | OUTPATIENT
Start: 2023-05-13 | End: 2023-05-18 | Stop reason: HOSPADM

## 2023-05-13 RX ORDER — BISACODYL 10 MG
10 SUPPOSITORY, RECTAL RECTAL
Status: CANCELLED | OUTPATIENT
Start: 2023-05-13

## 2023-05-13 RX ORDER — POLYETHYLENE GLYCOL 3350 17 G/17G
1 POWDER, FOR SOLUTION ORAL
Status: DISCONTINUED | OUTPATIENT
Start: 2023-05-13 | End: 2023-05-13

## 2023-05-13 RX ORDER — HYDROMORPHONE HYDROCHLORIDE 1 MG/ML
0.5 INJECTION, SOLUTION INTRAMUSCULAR; INTRAVENOUS; SUBCUTANEOUS
Status: DISCONTINUED | OUTPATIENT
Start: 2023-05-13 | End: 2023-05-18 | Stop reason: HOSPADM

## 2023-05-13 RX ORDER — ONDANSETRON 2 MG/ML
4 INJECTION INTRAMUSCULAR; INTRAVENOUS EVERY 4 HOURS PRN
Status: DISCONTINUED | OUTPATIENT
Start: 2023-05-13 | End: 2023-05-18 | Stop reason: HOSPADM

## 2023-05-13 RX ORDER — ZOLPIDEM TARTRATE 5 MG/1
5 TABLET ORAL
Status: DISCONTINUED | OUTPATIENT
Start: 2023-05-13 | End: 2023-05-18 | Stop reason: HOSPADM

## 2023-05-13 RX ORDER — SODIUM CHLORIDE, SODIUM LACTATE, POTASSIUM CHLORIDE, AND CALCIUM CHLORIDE .6; .31; .03; .02 G/100ML; G/100ML; G/100ML; G/100ML
500 INJECTION, SOLUTION INTRAVENOUS
Status: CANCELLED | OUTPATIENT
Start: 2023-05-13

## 2023-05-13 RX ADMIN — LORAZEPAM 0.5 MG: 2 INJECTION INTRAMUSCULAR; INTRAVENOUS at 00:56

## 2023-05-13 RX ADMIN — MORPHINE SULFATE 2 MG: 4 INJECTION INTRAVENOUS at 22:25

## 2023-05-13 RX ADMIN — VANCOMYCIN HYDROCHLORIDE 125 MG: 500 INJECTION, POWDER, LYOPHILIZED, FOR SOLUTION INTRAVENOUS at 04:33

## 2023-05-13 RX ADMIN — VANCOMYCIN HYDROCHLORIDE 125 MG: 5 INJECTION, POWDER, LYOPHILIZED, FOR SOLUTION INTRAVENOUS at 21:06

## 2023-05-13 RX ADMIN — SERTRALINE 50 MG: 50 TABLET, FILM COATED ORAL at 21:06

## 2023-05-13 RX ADMIN — ZOLPIDEM TARTRATE 5 MG: 5 TABLET ORAL at 21:06

## 2023-05-13 RX ADMIN — SODIUM CHLORIDE: 9 INJECTION, SOLUTION INTRAVENOUS at 18:16

## 2023-05-13 RX ADMIN — VANCOMYCIN HYDROCHLORIDE 125 MG: 500 INJECTION, POWDER, LYOPHILIZED, FOR SOLUTION INTRAVENOUS at 12:02

## 2023-05-13 RX ADMIN — VANCOMYCIN HYDROCHLORIDE 125 MG: 500 INJECTION, POWDER, LYOPHILIZED, FOR SOLUTION INTRAVENOUS at 00:56

## 2023-05-13 ASSESSMENT — PATIENT HEALTH QUESTIONNAIRE - PHQ9
SUM OF ALL RESPONSES TO PHQ9 QUESTIONS 1 AND 2: 0
SUM OF ALL RESPONSES TO PHQ9 QUESTIONS 1 AND 2: 0
1. LITTLE INTEREST OR PLEASURE IN DOING THINGS: NOT AT ALL
SUM OF ALL RESPONSES TO PHQ9 QUESTIONS 1 AND 2: 0
2. FEELING DOWN, DEPRESSED, IRRITABLE, OR HOPELESS: NOT AT ALL
1. LITTLE INTEREST OR PLEASURE IN DOING THINGS: NOT AT ALL
1. LITTLE INTEREST OR PLEASURE IN DOING THINGS: NOT AT ALL
2. FEELING DOWN, DEPRESSED, IRRITABLE, OR HOPELESS: NOT AT ALL
2. FEELING DOWN, DEPRESSED, IRRITABLE, OR HOPELESS: NOT AT ALL

## 2023-05-13 ASSESSMENT — COGNITIVE AND FUNCTIONAL STATUS - GENERAL
MOVING FROM LYING ON BACK TO SITTING ON SIDE OF FLAT BED: A LITTLE
CLIMB 3 TO 5 STEPS WITH RAILING: A LITTLE
TOILETING: A LITTLE
DRESSING REGULAR LOWER BODY CLOTHING: A LITTLE
MOVING TO AND FROM BED TO CHAIR: A LITTLE
STANDING UP FROM CHAIR USING ARMS: A LITTLE
SUGGESTED CMS G CODE MODIFIER DAILY ACTIVITY: CK
DAILY ACTIVITIY SCORE: 19
SUGGESTED CMS G CODE MODIFIER MOBILITY: CK
WALKING IN HOSPITAL ROOM: A LITTLE
MOBILITY SCORE: 18
DRESSING REGULAR UPPER BODY CLOTHING: A LITTLE
PERSONAL GROOMING: A LITTLE
HELP NEEDED FOR BATHING: A LITTLE
TURNING FROM BACK TO SIDE WHILE IN FLAT BAD: A LITTLE

## 2023-05-13 ASSESSMENT — LIFESTYLE VARIABLES
ON A TYPICAL DAY WHEN YOU DRINK ALCOHOL HOW MANY DRINKS DO YOU HAVE: 0
EVER FELT BAD OR GUILTY ABOUT YOUR DRINKING: NO
TOTAL SCORE: 0
TOTAL SCORE: 0
HAVE YOU EVER FELT YOU SHOULD CUT DOWN ON YOUR DRINKING: NO
CONSUMPTION TOTAL: NEGATIVE
DOES PATIENT WANT TO STOP DRINKING: NO
HOW MANY TIMES IN THE PAST YEAR HAVE YOU HAD 5 OR MORE DRINKS IN A DAY: 0
AVERAGE NUMBER OF DAYS PER WEEK YOU HAVE A DRINK CONTAINING ALCOHOL: 0
TOTAL SCORE: 0
EVER HAD A DRINK FIRST THING IN THE MORNING TO STEADY YOUR NERVES TO GET RID OF A HANGOVER: NO
ALCOHOL_USE: NO
HAVE PEOPLE ANNOYED YOU BY CRITICIZING YOUR DRINKING: NO

## 2023-05-13 ASSESSMENT — ENCOUNTER SYMPTOMS
NAUSEA: 1
VOMITING: 1
ABDOMINAL PAIN: 1

## 2023-05-13 ASSESSMENT — PAIN DESCRIPTION - PAIN TYPE
TYPE: ACUTE PAIN

## 2023-05-13 ASSESSMENT — FIBROSIS 4 INDEX
FIB4 SCORE: 1.91

## 2023-05-13 NOTE — ASSESSMENT & PLAN NOTE
- CT showing swirled appearance of the mesenteric vessels and there was a concern of partial or evolving cecal volvulus.  -General Surgery consulted, Dr. Zuniga, presentation not consistent with cecal volvulus, no further surgical needs.

## 2023-05-13 NOTE — PROGRESS NOTES
Patient was transported to [facility] via stretcher via [facility]. Report was also given to MARIAM Sinclair.

## 2023-05-13 NOTE — PROGRESS NOTES
Telemetry Shift Summary     Rhythm SR  HR Range 89-91  Ectopy rPVC  Measurements 0.18/0.10/0.38           Normal Values  Rhythm SR  HR Range    Measurements 0.12-0.20 / 0.06-0.10  / 0.30-0.52

## 2023-05-13 NOTE — PROGRESS NOTES
"       S: Kendra Winters  is a 67 y.o.  female admitted for C. difficile concern for possible cecal volvulus.  Patient with cecal bascule multiple episodes of diarrhea otherwise doing well      O:  BP 92/57   Pulse 81   Temp 37.1 °C (98.7 °F) (Temporal)   Resp 16   Ht 1.626 m (5' 4.02\")   Wt 64.9 kg (143 lb 1.3 oz)   SpO2 95%   Intake/Output                               05/11/23 0700 - 05/12/23 0659 (Not Admitted) 05/12/23 0700 - 05/13/23 0659 05/13/23 0700 - 05/14/23 0659     [id number] 1900-0659 Total 2758-8537 3458-6102 Total [id number] 1900-0659 Total                    Intake    I.V.  --  1000 1000  --  -- --  --  -- --    Volume (mL) (lactated ringers infusion (BOLUS)) -- 1000 1000 -- -- -- -- -- --    IV Piggyback  --  100 100  --  -- --  --  -- --    Volume (mL) (piperacillin-tazobactam (Zosyn) 4.5 g in  mL IVPB) -- 100 100 -- -- -- -- -- --    Total Intake -- 1100 1100 -- -- -- -- -- --       Output    Urine  --  -- --  --  -- --  --  -- --    Number of Times Voided -- -- -- -- 1 x 1 x -- -- --    Stool  --  -- --  --  -- --  --  -- --    Number of Times Stooled -- 2 x 2 x 8 x -- 8 x -- -- --    Total Output -- -- -- -- -- -- -- -- --       Net I/O     -- 1100 1100 -- -- -- -- -- --          Recent Labs     05/12/23 0120 05/13/23  0241   SODIUM 136 135   POTASSIUM 3.8 3.4*   CHLORIDE 104 106   CO2 17* 15*   GLUCOSE 86 72   BUN 14 12   CREATININE 0.66 0.57   CALCIUM 9.0 8.4     Recent Labs     05/12/23 0120 05/13/23  0241   WBC 15.2* 7.9   RBC 4.72 3.78*   HEMOGLOBIN 13.8 11.2*   HEMATOCRIT 41.4 34.4*   MCV 87.7 91.0   MCH 29.2 29.6   MCHC 33.3* 32.6*   RDW 41.8 45.3   PLATELETCT 269 190   MPV 9.0 9.1       Alert and Oriented x3, No Acute Distress  Normal Respiratory Effort  Abdomen soft, appropriately tender  Extremities warm and well perfused    A/P:  Cecal bascule.  No need for surgical intervention.  We will sign off at this time do not hesitate to reconsult questions or " concerns.    Oziel Jacome MD  [facility]

## 2023-05-13 NOTE — PROGRESS NOTES
Transfer from [facility] for Fecal transplant for Recurrent C diff diarrhea. Discussed plan of care with patient. Informed GI of admission.

## 2023-05-13 NOTE — PROGRESS NOTES
[facility]PITAL[facility] TRIAGE OFFICER DIRECT ADMISSION REPORT  Transferring facility: [facility]  Transferring physician: Dr Ramos  Transferring facility/physician contact number:   Chief complaint: diarrhea   Pertinent history & patient course: Recurrent C diff diarrhea, GI recommended transfer for stool transplant  Pertinent imaging & lab results:   Code Status: FULL per transferring provider, I personally verified with the transferring provider patient's code status and the transferring provider has confirmed this with the patient.  Further work up or recommendations per triage officer prior to transfer: None  Consultants called prior to transfer and pertinent input from consultants: GI - [facility]  Patient accepted for transfer: Yes  Consultants to be called upon arrival: GI   Admission status: Inpatient.   Floor requested: Medical  If ICU transfer, name of intensivist case discussed with and pertinent input from critical care: N/A    Please inform the triage officer upon arrival of the patient to [facility] for assignment of a hospitalist to perform admission.     For any question or concerns regarding the care of this patient, please reach out to the assigned hospitalist.

## 2023-05-13 NOTE — H&P
"Hospital Medicine History & Physical Note    Date of Service  5/13/2023    Primary Care Physician  Santa Otto M.D.    Consultants  GI    Specialist Names: Dr Richards    Code Status  Full Code    Chief Complaint  No chief complaint on file.      History of Presenting Illness  Per notes, \"67 y.o. female, with h/o HTN, HLD , depression and multiple episodes of C. difficile colitis for the last 8 years, currently on chronic p.o. vancomycin and status post fecal transplant 3 times in the past, who presented to the emergency department on 5/12/2023 with complaints of fever, nausea, vomiting, worsening diarrhea and abdominal pain.  Her pain is mostly intermittent, ranging from 5-10/10 in intensity.  Mostly sharp in nature and diffusely in her abdomen.  She describes having nonbloody emesis and a \"explosive diarrhea\" last night.  She denies bloody stool.  At the time my evaluation, her pain was much improved after receiving fentanyl in the ED.     ER COURSE:  -Febrile, tachypneic and tachycardic.  -WBC 15.2, lactic acid 2.4.  -CT of the abdomen and pelvis showed colitis, inflammatory versus ischemic and also swirled appearance of the mesenteric vessels showing concern for partial or evolving cecal volvulus.  -Patient was started on Zosyn and ERP discussed the case with general surgery, Dr. Zuniga, who evaluated patient in the morning.\"     Patient was admitted and initially evaluated by general surgery for possible volvulus.  Per general surgery, no need for surgical management, not consistent with volvulus.  Symptoms were consistent with recurrent C. difficile colitis.  I did discuss with gastroenterology at [facility] for possible stool transplant.  They recommend inpatient consult.  Patient is an outpatient of [facility], discussed with on-call gastroenterologist for GI see, also recommended stool transplant.  Therefore patient will transfer to [facility] for stool transplant.     Gastroenterology team, " Dr. Richards, at [facility] aware of consult.    I discussed the plan of care with patient, bedside RN, charge RN, and .    Review of Systems  Review of Systems   Constitutional:  Positive for malaise/fatigue.   Gastrointestinal:  Positive for abdominal pain, nausea and vomiting.   All other systems reviewed and are negative.      Past Medical History   has a past medical history of Adverse effect of anesthesia, Anesthesia, Arthritis, Bronchitis (2013), C. difficile colitis, C. difficile diarrhea (5/2013), Cancer (HCC) (2008), Cataract, Diverticulitis, High cholesterol, Hypertension, Insomnia related to another mental disorder, Pain, Pneumonia (2011), PONV (postoperative nausea and vomiting), Psychiatric disturbance, and ULCERATIVE COLITIS.    Surgical History   has a past surgical history that includes other orthopedic surgery; tonsillectomy; orif, wrist (9/5/2013); fecal transplant (11/05/2013); knee arthroplasty total (9/26/2014); hemorrhoidectomy; and pr total knee arthroplasty (Left, 5/31/2019).     Family History  family history includes Cancer in her maternal grandmother; Cancer (age of onset: 48) in her mother; Heart Disease in her father; Hypertension in her father.   Family history reviewed with patient. There is no family history that is pertinent to the chief complaint.     Social History   reports that she quit smoking about 10 years ago. Her smoking use included cigarettes. She has a 10.00 pack-year smoking history. She has never used smokeless tobacco. She reports current alcohol use. She reports that she does not use drugs.    Allergies  Allergies   Allergen Reactions    Latex Anaphylaxis, Shortness of Breath and Swelling       Medications  Cannot display prior to admission medications because the patient has not been admitted in this contact.       Physical Exam  Temp:  [36.6 °C (97.8 °F)-37.2 °C (99 °F)] 37 °C (98.6 °F)  Pulse:  [78-95] 78  Resp:  [15-18] 18  BP:  ()/(57-71) 105/62  SpO2:  [93 %-97 %] 97 %                          Physical Exam  Vitals and nursing note reviewed.   Constitutional:       Appearance: Normal appearance.   Cardiovascular:      Rate and Rhythm: Normal rate and regular rhythm.      Pulses: Normal pulses.      Heart sounds: Normal heart sounds.   Pulmonary:      Effort: Pulmonary effort is normal.      Breath sounds: Normal breath sounds.   Abdominal:      General: Abdomen is flat. Bowel sounds are normal.      Palpations: Abdomen is soft.      Tenderness: There is abdominal tenderness.   Musculoskeletal:      Right lower leg: No edema.      Left lower leg: No edema.   Neurological:      General: No focal deficit present.      Mental Status: She is alert and oriented to person, place, and time. Mental status is at baseline.         Laboratory:  Recent Labs     05/12/23  0120 05/13/23  0241   WBC 15.2* 7.9   RBC 4.72 3.78*   HEMOGLOBIN 13.8 11.2*   HEMATOCRIT 41.4 34.4*   MCV 87.7 91.0   MCH 29.2 29.6   MCHC 33.3* 32.6*   RDW 41.8 45.3   PLATELETCT 269 190   MPV 9.0 9.1     Recent Labs     05/12/23  0120 05/13/23  0241   SODIUM 136 135   POTASSIUM 3.8 3.4*   CHLORIDE 104 106   CO2 17* 15*   GLUCOSE 86 72   BUN 14 12   CREATININE 0.66 0.57   CALCIUM 9.0 8.4     Recent Labs     05/12/23  0120 05/13/23  0241   ALTSGPT 18  --    ASTSGOT 23  --    ALKPHOSPHAT 86  --    TBILIRUBIN 0.5  --    LIPASE 22  --    GLUCOSE 86 72     Recent Labs     05/12/23  0120   INR 1.04     No results for input(s): NTPROBNP in the last 72 hours.      No results for input(s): TROPONINT in the last 72 hours.    Imaging:  No orders to display       X-Ray:  I have personally reviewed the images and compared with prior images.    Assessment/Plan:  Justification for Admission Status  I anticipate this patient will require at least two midnights for appropriate medical management, necessitating inpatient admission because patient currently with having intractable diarrhea due  to C. difficile colitis.  Requiring IV fluids and evaluation by gastroenterology.    Patient will need a Med/Surg bed on MEDICAL service .  The need is secondary to c colitis    Cecal volvulus (HCC)- (present on admission)  Assessment & Plan  - CT showing swirled appearance of the mesenteric vessels and there was a concern of partial or evolving cecal volvulus.  -General Surgery consulted, Dr. Zuniga, presentation not consistent with cecal volvulus, no further surgical needs.    Hypokalemia- (present on admission)  Assessment & Plan  Continue to monitor and replace as needed    Hypercholesteremia- (present on admission)  Assessment & Plan  Continue with patient's home medications    Essential hypertension- (present on admission)  Assessment & Plan  Continue with patient's home medications    Clostridium difficile enterocolitis- (present on admission)  Assessment & Plan  CT of the abdomen show findings consistent with colitis.  -She has history of chronic C. difficile colitis for the last 8 years.  Status post fecal transplantation's x3 and on chronic p.o. vancomycin for the last year.  -Patient presenting with SIRS criteria, fever, elevated WBC at 15.2 and tachypneic.  She was a started on Zosyn in the emergency department- discontinued   -Underlying C. difficile not excluded and she also has CT findings concerning for partial or evolving cecal volvulus.  -Patient follows with GI in [place] for her underlying chronic C. difficile colitis.  -Gastroenterology consulted and recommending stool transplant  -Transfer to [facility] for stool transplant  -Continue with IV fluids as patient has had intractable diarrhea, poor oral intake        VTE prophylaxis: SCDs/TEDs    Total time spent on admission over 76 minutes.  This included my review with ER physician, review of ED events, patient's history, outside records, recent records, face to face interview, physical examination; my review of lab results (CBC,  chemistry panel), imaging review (CXR) and ECG.   In addition, counseling patient and speaking with consultants.

## 2023-05-13 NOTE — DISCHARGE SUMMARY
"Discharge Summary    CHIEF COMPLAINT ON ADMISSION  Chief Complaint   Patient presents with    N/V    Fever    Dizziness    Cough     Patient has having N/V, dizziness,  fever and cough for few days. Patient has hx of C diff last year on vancomycin. Patient was given zofran IV by the EMS.    Diarrhea       Reason for Admission  EMS     Admission Date  5/12/2023    CODE STATUS  Full Code    HPI & HOSPITAL COURSE  Per notes, \"67 y.o. female, with h/o HTN, HLD , depression and multiple episodes of C. difficile colitis for the last 8 years, currently on chronic p.o. vancomycin and status post fecal transplant 3 times in the past, who presented to the emergency department on 5/12/2023 with complaints of fever, nausea, vomiting, worsening diarrhea and abdominal pain.  Her pain is mostly intermittent, ranging from 5-10/10 in intensity.  Mostly sharp in nature and diffusely in her abdomen.  She describes having nonbloody emesis and a \"explosive diarrhea\" last night.  She denies bloody stool.  At the time my evaluation, her pain was much improved after receiving fentanyl in the ED.     ER COURSE:  -Febrile, tachypneic and tachycardic.  -WBC 15.2, lactic acid 2.4.  -CT of the abdomen and pelvis showed colitis, inflammatory versus ischemic and also swirled appearance of the mesenteric vessels showing concern for partial or evolving cecal volvulus.  -Patient was started on Zosyn and ERP discussed the case with general surgery, Dr. Zuniga, who evaluated patient in the morning.\"    Patient was admitted and initially evaluated by general surgery for possible volvulus.  Per general surgery, no need for surgical management, not consistent with volvulus.  Symptoms were consistent with recurrent C. difficile colitis.  I did discuss with gastroenterology at [facility] for possible stool transplant.  They recommend inpatient consult.  Patient is an outpatient of [facility], discussed with on-call gastroenterologist for GI " see, also recommended stool transplant.  Therefore patient will transfer to [facility] for stool transplant.    Gastroenterology team, Dr. Richards, at [facility] aware of consult.    Therefore, [facility]    The patient recovered much more quickly than anticipated on admission.    Discharge Date  5/13/2023    FOLLOW UP ITEMS POST DISCHARGE  Follow-up with gastroenterology team once patient arrives    DISCHARGE DIAGNOSES  Principal Problem:    Cecal volvulus (HCC) (POA: Yes)  Active Problems:    Depression with anxiety (POA: Yes)    Essential hypertension (POA: Yes)    Hypercholesteremia (POA: Yes)    SIRS (systemic inflammatory response syndrome) (HCC) (POA: Unknown)    Colitis (POA: Unknown)  Resolved Problems:    * No resolved hospital problems. *      FOLLOW UP  No future appointments.  No follow-up provider specified.    MEDICATIONS ON DISCHARGE     Medication List        ASK your doctor about these medications        Instructions   atorvastatin 20 MG Tabs  Commonly known as: LIPITOR   Take 20 mg by mouth every evening.  Dose: 20 mg     lisinopril 10 MG Tabs  Commonly known as: PRINIVIL   Take 10 mg by mouth every evening.  Dose: 10 mg     ondansetron 4 MG Tbdp  Commonly known as: ZOFRAN ODT  Ask about: Which instructions should I use?   Take 1 Tablet by mouth every 8 hours as needed for Nausea.  Dose: 4 mg     sertraline 50 MG Tabs  Commonly known as: Zoloft   Take 50 mg by mouth every evening.  Dose: 50 mg     vancomycin 125 MG capsule  Commonly known as: VANCOCIN   Take 125 mg by mouth see administration instructions. 125 mg 4 times a day for 7 days   125 mg twice a day for 7 days   125 mg daily for 7 days    125 mg every other day for 7 days   125 mg every 72 hours for 7 days  Dose: 125 mg     zolpidem 5 MG Tabs  Commonly known as: AMBIEN   Take 5 mg by mouth at bedtime.  Dose: 5 mg               Allergies  Allergies   Allergen Reactions    Latex Anaphylaxis, Shortness of Breath and Swelling       DIET  Orders Placed This Encounter   Procedures    Diet Order Diet: Clear Liquid     Standing Status:   Standing     Number of Occurrences:   1     Order Specific Question:   Diet:     Answer:   Clear Liquid [10]       ACTIVITY  As tolerated.  Weight bearing as tolerated    CONSULTATIONS  GI  General Surgery     PROCEDURES  None    LABORATORY  Lab Results   Component Value Date    SODIUM 135 05/13/2023    POTASSIUM 3.4 (L) 05/13/2023    CHLORIDE 106 05/13/2023    CO2 15 (L) 05/13/2023    GLUCOSE 72 05/13/2023    BUN 12 05/13/2023    CREATININE 0.57 05/13/2023        Lab Results   Component Value Date    WBC 7.9 05/13/2023    HEMOGLOBIN 11.2 (L) 05/13/2023    HEMATOCRIT 34.4 (L) 05/13/2023    PLATELETCT 190 05/13/2023        Total time of the discharge process exceeds 45 minutes.

## 2023-05-13 NOTE — ASSESSMENT & PLAN NOTE
CT of the abdomen show findings consistent with colitis.  -She has history of chronic C. difficile colitis for the last 8 years.  Status post fecal transplantation's x3 and on chronic p.o. vancomycin for the last year.  Plan for fecal transplant

## 2023-05-13 NOTE — CARE PLAN
The patient is Stable - Low risk of patient condition declining or worsening    Shift Goals  Clinical Goals: nausea control  Patient Goals: rest and comfort    Progress made toward(s) clinical / shift goals:  medication given per scale    Problem: Knowledge Deficit - Standard  Goal: Patient and family/care givers will demonstrate understanding of plan of care, disease process/condition, diagnostic tests and medications  Outcome: Progressing       Patient is not progressing towards the following goals:

## 2023-05-13 NOTE — PROGRESS NOTES
Pt is sitting upright in bed when received. No complaints of pain at this time. Verbalizes needs well and demonstrates use of call bell. Call bell in reach    Chart check completed    2235 Lab called with positive C-diff

## 2023-05-13 NOTE — DISCHARGE PLANNING
Case Management Discharge Planning    Admission Date: 5/12/2023  GMLOS: 3  ALOS: 1    6-Clicks ADL Score: 24  6-Clicks Mobility Score: 24      Anticipated Discharge Dispo: Discharge Disposition: D/T to short term gen hosp for  care (02)  Discharge Address: [address] [place] [address] [place] [address]  Discharge Contact Phone Number: [phone]    DME Needed: No    Action(s) Taken: LSW notified pt accepted by Dr. Alvarado at [facility] for fecal transplant. PCS form faxed to [facility].    Addendum @4377  Per notes, transportation arranged for 8889. [place]ra given to RN.    Escalations Completed: None    Medically Clear: Yes    Next Steps: No DC needs identified at this time    Barriers to Discharge: none    Is the patient up for discharge tomorrow: No

## 2023-05-13 NOTE — PROGRESS NOTES
Received bedside report from NOC RN, patient is sleeping but easy to arouse. Pt is A&O 4. Pt has no complaints of pain. Educated on pain medication use. Denies N/V as of assessment. Assumed care. All needs met at this time. Hourly rounding in place.

## 2023-05-14 LAB
ALBUMIN SERPL BCP-MCNC: 3.1 G/DL (ref 3.2–4.9)
BUN SERPL-MCNC: 8 MG/DL (ref 8–22)
CALCIUM ALBUM COR SERPL-MCNC: 9 MG/DL (ref 8.5–10.5)
CALCIUM SERPL-MCNC: 8.3 MG/DL (ref 8.5–10.5)
CHLORIDE SERPL-SCNC: 110 MMOL/L (ref 96–112)
CO2 SERPL-SCNC: 18 MMOL/L (ref 20–33)
CREAT SERPL-MCNC: 0.4 MG/DL (ref 0.5–1.4)
ERYTHROCYTE [DISTWIDTH] IN BLOOD BY AUTOMATED COUNT: 42.9 FL (ref 35.9–50)
GFR SERPLBLD CREATININE-BSD FMLA CKD-EPI: 108 ML/MIN/1.73 M 2
GLUCOSE SERPL-MCNC: 84 MG/DL (ref 65–99)
HCT VFR BLD AUTO: 34.6 % (ref 37–47)
HGB BLD-MCNC: 11.1 G/DL (ref 12–16)
MAGNESIUM SERPL-MCNC: 1.8 MG/DL (ref 1.5–2.5)
MCH RBC QN AUTO: 28.6 PG (ref 27–33)
MCHC RBC AUTO-ENTMCNC: 32.1 G/DL (ref 33.6–35)
MCV RBC AUTO: 89.2 FL (ref 81.4–97.8)
PHOSPHATE SERPL-MCNC: 2.1 MG/DL (ref 2.5–4.5)
PLATELET # BLD AUTO: 172 K/UL (ref 164–446)
PMV BLD AUTO: 9 FL (ref 9–12.9)
POTASSIUM SERPL-SCNC: 3.5 MMOL/L (ref 3.6–5.5)
RBC # BLD AUTO: 3.88 M/UL (ref 4.2–5.4)
SODIUM SERPL-SCNC: 139 MMOL/L (ref 135–145)
WBC # BLD AUTO: 6.8 K/UL (ref 4.8–10.8)

## 2023-05-14 PROCEDURE — 80069 RENAL FUNCTION PANEL: CPT

## 2023-05-14 PROCEDURE — 770020 HCHG ROOM/CARE - TELE (206)

## 2023-05-14 PROCEDURE — 700102 HCHG RX REV CODE 250 W/ 637 OVERRIDE(OP): Performed by: NURSE PRACTITIONER

## 2023-05-14 PROCEDURE — 99999 PR NO CHARGE: CPT | Performed by: INTERNAL MEDICINE

## 2023-05-14 PROCEDURE — 85027 COMPLETE CBC AUTOMATED: CPT

## 2023-05-14 PROCEDURE — 700111 HCHG RX REV CODE 636 W/ 250 OVERRIDE (IP): Performed by: HOSPITALIST

## 2023-05-14 PROCEDURE — A9270 NON-COVERED ITEM OR SERVICE: HCPCS | Performed by: NURSE PRACTITIONER

## 2023-05-14 PROCEDURE — 700102 HCHG RX REV CODE 250 W/ 637 OVERRIDE(OP): Performed by: HOSPITALIST

## 2023-05-14 PROCEDURE — A9270 NON-COVERED ITEM OR SERVICE: HCPCS | Performed by: HOSPITALIST

## 2023-05-14 PROCEDURE — 700101 HCHG RX REV CODE 250: Performed by: INTERNAL MEDICINE

## 2023-05-14 PROCEDURE — 99223 1ST HOSP IP/OBS HIGH 75: CPT | Performed by: NURSE PRACTITIONER

## 2023-05-14 PROCEDURE — 83735 ASSAY OF MAGNESIUM: CPT

## 2023-05-14 PROCEDURE — 36415 COLL VENOUS BLD VENIPUNCTURE: CPT

## 2023-05-14 PROCEDURE — 99232 SBSQ HOSP IP/OBS MODERATE 35: CPT | Performed by: HOSPITALIST

## 2023-05-14 PROCEDURE — 700111 HCHG RX REV CODE 636 W/ 250 OVERRIDE (IP): Performed by: INTERNAL MEDICINE

## 2023-05-14 RX ORDER — CALCIUM CARBONATE 500 MG/1
500 TABLET, CHEWABLE ORAL 3 TIMES DAILY PRN
Status: DISCONTINUED | OUTPATIENT
Start: 2023-05-14 | End: 2023-05-18 | Stop reason: HOSPADM

## 2023-05-14 RX ORDER — MAGNESIUM SULFATE HEPTAHYDRATE 40 MG/ML
2 INJECTION, SOLUTION INTRAVENOUS ONCE
Status: COMPLETED | OUTPATIENT
Start: 2023-05-14 | End: 2023-05-14

## 2023-05-14 RX ORDER — POTASSIUM CHLORIDE 20 MEQ/1
40 TABLET, EXTENDED RELEASE ORAL ONCE
Status: COMPLETED | OUTPATIENT
Start: 2023-05-14 | End: 2023-05-14

## 2023-05-14 RX ADMIN — ZOLPIDEM TARTRATE 5 MG: 5 TABLET ORAL at 22:06

## 2023-05-14 RX ADMIN — VANCOMYCIN HYDROCHLORIDE 125 MG: 5 INJECTION, POWDER, LYOPHILIZED, FOR SOLUTION INTRAVENOUS at 11:52

## 2023-05-14 RX ADMIN — POTASSIUM CHLORIDE 40 MEQ: 1500 TABLET, EXTENDED RELEASE ORAL at 17:22

## 2023-05-14 RX ADMIN — MORPHINE SULFATE 2 MG: 4 INJECTION INTRAVENOUS at 22:06

## 2023-05-14 RX ADMIN — ANTACID TABLETS 500 MG: 500 TABLET, CHEWABLE ORAL at 13:26

## 2023-05-14 RX ADMIN — MAGNESIUM SULFATE HEPTAHYDRATE 2 G: 40 INJECTION, SOLUTION INTRAVENOUS at 17:28

## 2023-05-14 RX ADMIN — SERTRALINE 50 MG: 50 TABLET, FILM COATED ORAL at 17:23

## 2023-05-14 RX ADMIN — VANCOMYCIN HYDROCHLORIDE 125 MG: 5 INJECTION, POWDER, LYOPHILIZED, FOR SOLUTION INTRAVENOUS at 17:23

## 2023-05-14 RX ADMIN — VANCOMYCIN HYDROCHLORIDE 125 MG: 5 INJECTION, POWDER, LYOPHILIZED, FOR SOLUTION INTRAVENOUS at 06:43

## 2023-05-14 ASSESSMENT — ENCOUNTER SYMPTOMS
DEPRESSION: 0
BLURRED VISION: 0
DIZZINESS: 0
CHILLS: 1
BACK PAIN: 0
FEVER: 1
NAUSEA: 0
CONSTIPATION: 0
BLOOD IN STOOL: 0
ABDOMINAL PAIN: 1
WEAKNESS: 0
HEARTBURN: 1
COUGH: 0
DIARRHEA: 1
SHORTNESS OF BREATH: 0
VOMITING: 0

## 2023-05-14 ASSESSMENT — PAIN DESCRIPTION - PAIN TYPE
TYPE: ACUTE PAIN

## 2023-05-14 NOTE — PROGRESS NOTES
Report received  from Bianca BECERRA of [facility]. Patient transported via [facility]. Baseline ht and wt and VS taken.   Assessment complete.  A&O x 4. Patient calls appropriately.  Patient up self with steady gait.    Patient declines of pain at this time. Comfort measures provided.  Denies N&V. Tolerating clear liquid diet advance to GI soft as tolerated.    Skin is intact.  + void, + flatus, Last BM was PTA .  Patient denies SOB.  SCD's off, patient ambulating.  Patient is pleasant and cooperative to plan of  care.  Review plan with of care with patient. Call light and personal belongings within reach. Hourly rounding in place. All needs met at this time.

## 2023-05-14 NOTE — HOSPITAL COURSE
This is a 87-year-old female with a past medical history significant for hypertension, hyperlipidemia, depression, multiple episodes of C. difficile colitis for the last 8 years; on chronic oral vancomycin.  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    Upon presentation, she noted to be febrile, tachypnea, tachycardia, WBC 15.2, lactic acid 2.4, CT scan of abdomen pelvis with colitis, inflammatory versus ischemic' also swirled appearance of the mesenteric vessels showing concern for partial or evolving cecal volvulus.    Blood culture x2 ordered ; General surgery  has evaluated initially for volvulus..  Per general surgery, no surgical intervention; GI was consulted, who recommended transferring the patient to [facility].    Interval events:  -- No acute events overnight.  Vital sign has been stable, patient is alert, awake, answering questions appropriately.  GI evaluated, recommended holding vancomycin for 24 hours  --K at 3.5, will replete as needed   -- Fecal transplant on Wednesday, need to be hold oral vancomycin for 24 hours prior to this.  GI following, will follow their recommendation.  -- alia si slow , will replete as needed

## 2023-05-14 NOTE — CARE PLAN
The patient is Stable - Low risk of patient condition declining or worsening    Shift Goals  Clinical Goals: Pain control, safety, rest  Patient Goals: Rest    Progress made toward(s) clinical / shift goals:  Pain managed w/ medication pr MD order. Pt resting. Ambulating in room.     Patient is not progressing towards the following goals:

## 2023-05-14 NOTE — CONSULTS
..Date of Consultation:  5/14/2023    Patient: : Kendra Winters  MRN: [id number]    Referring Physician: Dr. Earnest Porter     GI:ANG Copeland     Reason for Consultation: Refractory C. difficile colitis    History of Present Illness: Kendra Alberts is a 67-year-old female who was transferred from [facility] for consideration of fecal transplant.  Patient has a past medical history significant for arthritis, C. difficile colitis, diverticulitis, hyperlipidemia, hypertension, insomnia, and left foot surgery.  Patient initially presented on 5/12/2013 with complaints of fever Tmax 103, chills, nausea, vomiting, worsening bloody diarrhea, and abdominal pain.  At [facility], she was febrile, tachypneic, and tachycardic with WBC 15.2 and lactic acid of 2.4.  CT scan of the abdomen and pelvis showed colitis, inflammatory versus ischemic and also concern for partial or evolving cecal volvulus.  Patient was empirically started on Zosyn at that time and a surgical consult was placed.  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  Leukocytosis has resolved and WBCs are now 6.8.  C. difficile toxin A&B is positive    Patient was originally seeing Dr. Flora Crowe of [facility], but she moved on and she was then seen Dr. Kendra Chanel who also left.  She describes her first incidence of C. difficile was 9 years ago.  At that time, she was given fecal transplant via nasoenteric tube however vomited up on the way home.  Second attempt was made 1 week later which did not take.  The third fecal transplant was completed via colonoscopy a couple weeks later which has worked for years.  Patient reports last year in May 2022, she required to have a left foot operation.  She discussed with orthopedic at that time that she would like to have antibiotics that were less C. difficile inducing.  Unfortunately, she ended up getting C. difficile at  "that time and has been on oral vancomycin and Dificid and since May of last year.    She actually traveled to [place] last month to see Dr. Crowe as she was unable to get into see any GI provider in [place].  Dr. Crowe was unable to provide fecal transplantation for her in [place], but advised that she try home transplantation.  Patient followed the directions (Place stool in a  with sterile water, pour over a coffee filter, and place in enema bottle). She attempted this three times with no effect.     Patient is exhausted and feels that this is ruining her life.  She is struggling to maintain full-time working in family life.  She is having upwards of 12 bowel movements a day and her abdominal pressure is constant and now has heartburn.  She previously lost 35 pounds during her last severe episode of C. difficile, this time she is only had minimal weight loss.  She does not take any aspirin or ibuprofen.  She drinks an occasional wine.  Her last colonoscopy was approximately 2020 and EGD 2017, both which were reportedly normal.      Past Medical History:   Diagnosis Date    C. difficile diarrhea 05/2013    resolved since 1/2014    Bronchitis 2013    Pneumonia 2011    Adverse effect of anesthesia     \"dizzy\" post op    Anesthesia     slow to wake up    Arthritis     knees    C. difficile colitis     Cataract     no sx    Diverticulitis     High cholesterol     Hypertension     Insomnia related to another mental disorder     stress    Pain     right knee    PONV (postoperative nausea and vomiting)     Psychiatric disturbance     stress    ULCERATIVE COLITIS          Past Surgical History:   Procedure Laterality Date    PB TOTAL KNEE ARTHROPLASTY Left 5/31/2019    Procedure: ARTHROPLASTY, KNEE, TOTAL;  Surgeon: Marco Antonio Friend M.D.;  Location: [facility];  Service: Orthopedics    KNEE ARTHROPLASTY TOTAL  9/26/2014    Performed by Marco Antonio Friend M.D. at [facility]    FECAL TRANSPLANT  " "11/05/2013    x3    ORIF, WRIST  9/5/2013    Performed by Teto Hernandez M.D. at [facility]    HEMORRHOIDECTOMY      OTHER ORTHOPEDIC SURGERY      knee     TONSILLECTOMY         Family History   Problem Relation Age of Onset    Cancer Mother 48        breast    Cancer Maternal Grandmother     Hypertension Father     Heart Disease Father        Social History     Socioeconomic History    Marital status:    Tobacco Use    Smoking status: Former     Packs/day: 0.50     Years: 20.00     Pack years: 10.00     Types: Cigarettes     Quit date: 2/20/2013     Years since quitting: 10.2    Smokeless tobacco: Never    Tobacco comments:     2 cig /week   Vaping Use    Vaping Use: Never used   Substance and Sexual Activity    Alcohol use: Never     Comment: 2-3 per week    Drug use: No    Sexual activity: Not Currently       Review of systems:  Review of Systems   Constitutional:  Positive for chills, fever and malaise/fatigue.   HENT:  Negative for hearing loss.    Eyes:  Negative for blurred vision.   Respiratory:  Negative for cough and shortness of breath.    Cardiovascular:  Negative for chest pain and leg swelling.   Gastrointestinal:  Positive for abdominal pain, diarrhea and heartburn. Negative for blood in stool, constipation, melena, nausea and vomiting.   Genitourinary:  Negative for dysuria.   Musculoskeletal:  Negative for back pain.   Skin:  Negative for rash.   Neurological:  Negative for dizziness and weakness.   Psychiatric/Behavioral:  Negative for depression.    All other systems reviewed and are negative.        Physical Exam:  Vitals:    05/13/23 1704 05/13/23 1932 05/13/23 2344 05/14/23 0317   BP: (!) 142/71 130/79 126/65 126/68   Pulse:  79 76 74   Resp: 16 17 16 16   Temp: 36.5 °C (97.7 °F) 36.7 °C (98.1 °F) 36.7 °C (98.1 °F) 36.6 °C (97.9 °F)   TempSrc:  Temporal Temporal Temporal   SpO2:  99% 93% 94%   Weight: 63.5 kg (139 lb 15.9 oz)      Height: 1.626 m (5' 4\")    "       Physical Exam  Vitals and nursing note reviewed.   Constitutional:       General: She is not in acute distress.     Appearance: Normal appearance.   HENT:      Head: Normocephalic and atraumatic.      Right Ear: External ear normal.      Left Ear: External ear normal.      Nose: Nose normal.      Mouth/Throat:      Mouth: Mucous membranes are moist.      Pharynx: Oropharynx is clear.   Eyes:      General: No scleral icterus.  Cardiovascular:      Rate and Rhythm: Normal rate and regular rhythm.      Pulses: Normal pulses.      Heart sounds: Normal heart sounds.   Pulmonary:      Effort: Pulmonary effort is normal. No respiratory distress.      Breath sounds: Normal breath sounds.   Abdominal:      General: Abdomen is flat. Bowel sounds are normal. There is distension.      Palpations: Abdomen is soft.      Tenderness: There is abdominal tenderness.   Musculoskeletal:         General: Normal range of motion.      Cervical back: Normal range of motion and neck supple.   Skin:     General: Skin is warm and dry.      Capillary Refill: Capillary refill takes less than 2 seconds.      Coloration: Skin is pale.   Neurological:      Mental Status: She is alert and oriented to person, place, and time.   Psychiatric:         Mood and Affect: Mood normal.         Behavior: Behavior normal.           Labs:  Recent Labs     05/12/23  0120 05/13/23  0241 05/14/23  0535   WBC 15.2* 7.9 6.8   RBC 4.72 3.78* 3.88*   HEMOGLOBIN 13.8 11.2* 11.1*   HEMATOCRIT 41.4 34.4* 34.6*   MCV 87.7 91.0 89.2   MCH 29.2 29.6 28.6   MCHC 33.3* 32.6* 32.1*   RDW 41.8 45.3 42.9   PLATELETCT 269 190 172   MPV 9.0 9.1 9.0     Recent Labs     05/12/23  0120 05/13/23  0241 05/14/23  0535   SODIUM 136 135 139   POTASSIUM 3.8 3.4* 3.5*   CHLORIDE 104 106 110   CO2 17* 15* 18*   GLUCOSE 86 72 84   BUN 14 12 8     Recent Labs     05/12/23  0120   INR 1.04       Recent Labs     05/12/23  0120   ASTSGOT 23   ALTSGPT 18   TBILIRUBIN 0.5   ALKPHOSPHAT 86    GLOBULIN 2.5   INR 1.04         Imaging:  CT-ABDOMEN-PELVIS WITH  Narrative:   5/12/2023 1:51 AM    HISTORY/REASON FOR EXAM:  llq pain diarrhea.    TECHNIQUE/EXAM DESCRIPTION:   CT scan of the abdomen and pelvis with contrast.    Contrast-enhanced helical scanning was obtained from the diaphragmatic domes through the pubic symphysis following the bolus administration of nonionic contrast without complication.    100 mL of Omnipaque 350 nonionic contrast was administered without complication.    Low dose optimization technique was utilized for this CT exam including automated exposure control and adjustment of the mA and/or kV according to patient size.    COMPARISON: December 26, 2022    FINDINGS:    Lower Chest: Unremarkable.    Liver: Normal.    Spleen: Unremarkable.    Pancreas: Unremarkable.    Gallbladder: No calcified stones.    Biliary: Nondilated.    Adrenal glands: Normal.    Kidneys: Unremarkable without hydronephrosis.    Bowel: There is colonic wall thickening with adjacent hazy fat stranding from the splenic flexure through the sigmoid and rectum. There is swirled appearance of the mesenteric vessels and rotated appearance of the bowel in the right upper quadrant at the   inferior margin of liver. The cecum is seen within the right upper quadrant anterior to the liver.    Lymph nodes: No adenopathy.    Vasculature: Atherosclerotic changes are seen.    Peritoneum: Unremarkable without ascites.    Musculoskeletal: No acute or destructive process.    Pelvis: No adenopathy or free fluid. The uterus and adnexal structures appear within physiologic limits.  Impression: 1.  Colonic wall thickening and pericolonic fat stranding of the left colon favoring colitis. Consider inflammatory or infectious versus ischemic etiologies in the appropriate clinical setting.  2.  Swirled appearance of the mesenteric vessels and rotated appearance of the bowel in the right upper quadrant with cecum in the right upper  quadrant anterior to the liver. Appearance raising concern for partial or evolving cecal volvulus. However   there are no visualized obstructive changes at this time.  3.  Atherosclerosis  DX-CHEST-PORTABLE (1 VIEW)  Narrative:   5/12/2023 1:59 AM    HISTORY/REASON FOR EXAM: Sepsis    TECHNIQUE/EXAM DESCRIPTION:  Single AP view of the chest.    COMPARISON: June 13, 2019    FINDINGS:    Overlying cardiac leads are present. The cardiac silhouette appears within normal limits.    The mediastinal contour appears within normal limits.  The central pulmonary vasculature appears normal.    Bilateral lung volumes are diminished.  Bilateral lungs are clear.    No significant pleural effusions are identified.    The bony structures appear age-appropriate.  Impression: 1.  No acute cardiopulmonary disease.      Impressions:  Recurrent C. difficile colitis  History of diverticulitis  Hypertension  Hyperlipidemia  Left foot surgery 5/22  Insomnia    MDM  Patient is a 67-year-old female with past medical history significant for recurrent C. difficile colitis for the past 9 years who presents from [facility] for consideration of fecal transplant.  Initially found to have leukocytosis and lactic acidosis with fever which has since resolved.  She is currently afebrile and hemodynamically stable.    Recommendations:  GI team will do fecal transplant this week  Stool is ordered, anticipate delivery 5/16  We will plan for procedure 5/17  Vancomycin will need to be held 24 hours prior  GoLytely the evening before    GI team will continue to follow.    Plan discussed with patient, Dr. Luna, and Dr. Rowley.    This note was generated using voice recognition software which has a small chance of producing errors of grammar and possibly content. I have made every reasonable attempt to find and correct any obvious errors, but expect that some may not be found prior to finalization of this note.

## 2023-05-14 NOTE — PROGRESS NOTES
Bedside report received from night shift nurse. Assumed care at 0645.   Pt A&Ox4  Tolerating clear liquid diet, denies n/v. Hyperactive bowel sounds, passing flatus, LBM 5/14. IV access through 20g L wrist that is running NS @ 83 mL/hr.  Skin intact.  Saturating >90% on RA.  Pt ambulates independently.  Pain is controlled through medication orders. Updated on plan of care. Safety education provided. Bed locked in low. Call light within reach. Rounding in place.

## 2023-05-14 NOTE — PROGRESS NOTES
4 Eyes Skin Assessment Completed by MARIAM Sinclair and MARIAM Martinez.    Head WDL  Ears WDL  Nose WDL  Mouth WDL  Neck WDL  Breast/Chest WDL  Shoulder Blades WDL  Spine WDL  (R) Arm/Elbow/Hand WDL  (L) Arm/Elbow/Hand WDL  Abdomen WDL  Groin WDL  Scrotum/Coccyx/Buttocks WDL  (R) Leg R knee Scar  (L) Leg Left knee  Scar  (R) Heel/Foot/Toe Scar  (L) Heel/Foot/Toe WDL      Devices In Places Tele Box and Blood Pressure Cuff    Interventions In Place Pillows    Possible Skin Injury No    Pictures Uploaded Into Epic No, needs to be completed  Wound Consult Placed N/A  RN Wound Prevention Protocol Ordered No

## 2023-05-14 NOTE — PROGRESS NOTES
Hospital Medicine Daily Progress Note    Date of Service  5/14/2023    Chief Complaint  Kendra Winters is a 67 y.o. female admitted 5/13/2023 with No chief complaint on file.      Hospital Course  This is a 87-year-old female with a past medical history significant for hypertension, hyperlipidemia, depression, multiple episodes of C. difficile colitis for the last 8 years; on chronic oral vancomycin.  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    Upon presentation, she noted to be febrile, tachypnea, tachycardia, WBC 15.2, lactic acid 2.4, CT scan of abdomen pelvis with colitis, inflammatory versus ischemic' also swirled appearance of the mesenteric vessels showing concern for partial or evolving cecal volvulus.    Blood culture x2 ordered ; General surgery  has evaluated initially for volvulus..  Per general surgery, no surgical intervention; GI was consulted, who recommended transferring the patient to [facility].    Interval events:  -- No acute events overnight.  Vital sign has been stable, patient is alert, awake, answering questions appropriately.  GI evaluated, recommended holding vancomycin for 24 hours  --K at 3.5, will replete as needed   -- Fecal transplant on Wednesday, need to be hold oral vancomycin for 24 hours prior to this.  GI following, will follow their recommendation.  -- alia si slow , will replete as needed       I have discussed this patient's plan of care and discharge plan at IDT rounds today with Case Management, Nursing, Nursing leadership, and other members of the IDT team.    Consultants/Specialty  GI    Code Status  Full Code    Disposition  The patient is not medically cleared for discharge to home or a post-acute facility.  Anticipate discharge to: home with organized home healthcare and close outpatient follow-up    I have placed the appropriate  orders for post-discharge needs.    Review of Systems  ROS     Physical Exam  Temp:  [36.3 °C (97.3 °F)-36.7 °C (98.1 °F)] 36.6 °C (97.9 °F)  Pulse:  [68-79] 68  Resp:  [14-18] 18  BP: (122-142)/(65-80) 130/80  SpO2:  [92 %-99 %] 96 %    Physical Exam    Fluids    Intake/Output Summary (Last 24 hours) at 5/14/2023 1332  Last data filed at 5/14/2023 1000  Gross per 24 hour   Intake 1266 ml   Output --   Net 1266 ml       Laboratory  Recent Labs     05/12/23  0120 05/13/23  0241 05/14/23  0535   WBC 15.2* 7.9 6.8   RBC 4.72 3.78* 3.88*   HEMOGLOBIN 13.8 11.2* 11.1*   HEMATOCRIT 41.4 34.4* 34.6*   MCV 87.7 91.0 89.2   MCH 29.2 29.6 28.6   MCHC 33.3* 32.6* 32.1*   RDW 41.8 45.3 42.9   PLATELETCT 269 190 172   MPV 9.0 9.1 9.0     Recent Labs     05/12/23  0120 05/13/23  0241 05/14/23  0535   SODIUM 136 135 139   POTASSIUM 3.8 3.4* 3.5*   CHLORIDE 104 106 110   CO2 17* 15* 18*   GLUCOSE 86 72 84   BUN 14 12 8   CREATININE 0.66 0.57 0.40*   CALCIUM 9.0 8.4 8.3*     Recent Labs     05/12/23  0120   INR 1.04               Imaging  No orders to display        Assessment/Plan  Cecal volvulus (HCC)- (present on admission)  Assessment & Plan  - CT showing swirled appearance of the mesenteric vessels and there was a concern of partial or evolving cecal volvulus.  -General Surgery consulted, Dr. Zuniga, presentation not consistent with cecal volvulus, no further surgical needs.    Hypokalemia- (present on admission)  Assessment & Plan  Continue to monitor and replace as needed    Hypercholesteremia- (present on admission)  Assessment & Plan  statin    Essential hypertension- (present on admission)  Assessment & Plan  Continue with patient's home medications    Clostridium difficile enterocolitis- (present on admission)  Assessment & Plan  CT of the abdomen show findings consistent with colitis.  -She has history of chronic C. difficile colitis for the last 8 years.  Status post fecal transplantation's x3 and on chronic p.o.  vancomycin for the last year.   -- GI consulted, plan for fecal transplant on Wednesday          VTE prophylaxis: scd

## 2023-05-14 NOTE — CARE PLAN
The patient is Stable - Low risk of patient condition declining or worsening    Shift Goals  Clinical Goals: pain control. remains afebrile    Progress made toward(s) clinical / shift goals:  pain controlled with morphine 2mg prn. C/o abd pain     Patient is not progressing towards the following goals:    Problem: Knowledge Deficit - Standard  Goal: Patient and family/care givers will demonstrate understanding of plan of care, disease process/condition, diagnostic tests and medications  Outcome: Progressing     Problem: Pain - Standard  Goal: Alleviation of pain or a reduction in pain to the patient’s comfort goal  Outcome: Progressing

## 2023-05-14 NOTE — PROGRESS NOTES
Assumed care at 1845. Pt resting in bed. A&ox 4  Ambulating independently  O2 on RA  Tolerating clears  Voiding adequately  No bm tonight  Pain controlled  Special Contact precaution in place  Call light within reach. Hourly rounding in place

## 2023-05-15 LAB
ALBUMIN SERPL BCP-MCNC: 3.3 G/DL (ref 3.2–4.9)
ALBUMIN/GLOB SERPL: 1.5 G/DL
ALP SERPL-CCNC: 65 U/L (ref 30–99)
ALT SERPL-CCNC: 11 U/L (ref 2–50)
ANION GAP SERPL CALC-SCNC: 10 MMOL/L (ref 7–16)
AST SERPL-CCNC: 14 U/L (ref 12–45)
BILIRUB SERPL-MCNC: 0.2 MG/DL (ref 0.1–1.5)
BUN SERPL-MCNC: 6 MG/DL (ref 8–22)
CALCIUM ALBUM COR SERPL-MCNC: 8.7 MG/DL (ref 8.5–10.5)
CALCIUM SERPL-MCNC: 8.1 MG/DL (ref 8.5–10.5)
CHLORIDE SERPL-SCNC: 109 MMOL/L (ref 96–112)
CO2 SERPL-SCNC: 19 MMOL/L (ref 20–33)
CREAT SERPL-MCNC: 0.44 MG/DL (ref 0.5–1.4)
ERYTHROCYTE [DISTWIDTH] IN BLOOD BY AUTOMATED COUNT: 41.1 FL (ref 35.9–50)
GFR SERPLBLD CREATININE-BSD FMLA CKD-EPI: 106 ML/MIN/1.73 M 2
GLOBULIN SER CALC-MCNC: 2.2 G/DL (ref 1.9–3.5)
GLUCOSE SERPL-MCNC: 94 MG/DL (ref 65–99)
HCT VFR BLD AUTO: 33.6 % (ref 37–47)
HGB BLD-MCNC: 11.5 G/DL (ref 12–16)
MCH RBC QN AUTO: 29.9 PG (ref 27–33)
MCHC RBC AUTO-ENTMCNC: 34.2 G/DL (ref 33.6–35)
MCV RBC AUTO: 87.3 FL (ref 81.4–97.8)
PLATELET # BLD AUTO: 213 K/UL (ref 164–446)
PMV BLD AUTO: 9.2 FL (ref 9–12.9)
POTASSIUM SERPL-SCNC: 3.6 MMOL/L (ref 3.6–5.5)
PROT SERPL-MCNC: 5.5 G/DL (ref 6–8.2)
RBC # BLD AUTO: 3.85 M/UL (ref 4.2–5.4)
SODIUM SERPL-SCNC: 138 MMOL/L (ref 135–145)
WBC # BLD AUTO: 6.5 K/UL (ref 4.8–10.8)

## 2023-05-15 PROCEDURE — 80053 COMPREHEN METABOLIC PANEL: CPT

## 2023-05-15 PROCEDURE — 85027 COMPLETE CBC AUTOMATED: CPT

## 2023-05-15 PROCEDURE — 99232 SBSQ HOSP IP/OBS MODERATE 35: CPT | Performed by: HOSPITALIST

## 2023-05-15 PROCEDURE — A9270 NON-COVERED ITEM OR SERVICE: HCPCS | Performed by: NURSE PRACTITIONER

## 2023-05-15 PROCEDURE — 770020 HCHG ROOM/CARE - TELE (206)

## 2023-05-15 PROCEDURE — 36415 COLL VENOUS BLD VENIPUNCTURE: CPT

## 2023-05-15 PROCEDURE — 700102 HCHG RX REV CODE 250 W/ 637 OVERRIDE(OP): Performed by: NURSE PRACTITIONER

## 2023-05-15 PROCEDURE — 700101 HCHG RX REV CODE 250: Performed by: INTERNAL MEDICINE

## 2023-05-15 PROCEDURE — 700111 HCHG RX REV CODE 636 W/ 250 OVERRIDE (IP): Performed by: INTERNAL MEDICINE

## 2023-05-15 PROCEDURE — 99232 SBSQ HOSP IP/OBS MODERATE 35: CPT | Performed by: NURSE PRACTITIONER

## 2023-05-15 RX ADMIN — VANCOMYCIN HYDROCHLORIDE 125 MG: 5 INJECTION, POWDER, LYOPHILIZED, FOR SOLUTION INTRAVENOUS at 00:24

## 2023-05-15 RX ADMIN — VANCOMYCIN HYDROCHLORIDE 125 MG: 5 INJECTION, POWDER, LYOPHILIZED, FOR SOLUTION INTRAVENOUS at 17:17

## 2023-05-15 RX ADMIN — VANCOMYCIN HYDROCHLORIDE 125 MG: 5 INJECTION, POWDER, LYOPHILIZED, FOR SOLUTION INTRAVENOUS at 12:27

## 2023-05-15 RX ADMIN — VANCOMYCIN HYDROCHLORIDE 125 MG: 5 INJECTION, POWDER, LYOPHILIZED, FOR SOLUTION INTRAVENOUS at 06:15

## 2023-05-15 RX ADMIN — VANCOMYCIN HYDROCHLORIDE 125 MG: 5 INJECTION, POWDER, LYOPHILIZED, FOR SOLUTION INTRAVENOUS at 23:01

## 2023-05-15 RX ADMIN — ZOLPIDEM TARTRATE 5 MG: 5 TABLET ORAL at 22:39

## 2023-05-15 RX ADMIN — MORPHINE SULFATE 2 MG: 4 INJECTION INTRAVENOUS at 22:37

## 2023-05-15 RX ADMIN — SERTRALINE 50 MG: 50 TABLET, FILM COATED ORAL at 17:17

## 2023-05-15 ASSESSMENT — PAIN DESCRIPTION - PAIN TYPE
TYPE: ACUTE PAIN

## 2023-05-15 ASSESSMENT — ENCOUNTER SYMPTOMS
VOMITING: 0
CHILLS: 1
ABDOMINAL PAIN: 1
CONSTIPATION: 0
BLURRED VISION: 0
BLOOD IN STOOL: 0
NAUSEA: 0
FEVER: 1
DEPRESSION: 0
DIARRHEA: 1
WEAKNESS: 0
HEARTBURN: 1
SHORTNESS OF BREATH: 0
DIZZINESS: 0
COUGH: 0
BACK PAIN: 0

## 2023-05-15 NOTE — PROGRESS NOTES
Bedside report received from night shift nurse. Assumed care at 0645.   Pt A&Ox4  Tolerating clear liquid diet, denies n/v. Hyperactive bowel sounds, passing flatus, LBM 5/14. IV access through 20g L wrist that is running NS @ 83 mL/hr.  Skin intact.  Saturating >90% on RA.  Pt ambulates independently.  Pain is controlled through medication orders. Updated on plan of care. Safety education provided. Bed locked in low. Call light within reach. Rounding in place

## 2023-05-15 NOTE — PROGRESS NOTES
Received report from day shift RN. Assumed patient care.    Patient is A+O x4.  Patient is on 0L-RA.   Tolerating GI Soft diet. Denies N/V.  Denies chest pain or SOB.  Pain 6 on a 0-10 pain scale.   + void, + BM. Last BM 5/14.  Skin per flowsheets.   Patient ambulates independently.     Plan of care discussed, all questions answered. Educated on the importance of calling before getting OOB and pt verbalizes understanding. Educated regarding importance of oral care. Oral care kit at bedside. Call light is within reach, treaded slipper socks on, bed in lowest/ locked position, hourly rounding in place, all needs met at this time

## 2023-05-15 NOTE — PROGRESS NOTES
..Gastroenterology Progress Note               Author:  KAMRON CopelandRADENIKE Date & Time Created: 5/15/2023 7:14 AM       Patient ID:  Name:             Kendra Jack  YOB: 1955  Age:                 67 y.o.  female  MRN:               [id number]        Medical Decision Making, by Problem:  Active Hospital Problems    Diagnosis     Cecal volvulus (HCC) [K56.2]     Hypokalemia [E87.6]     Essential hypertension [I10]     Hypercholesteremia [E78.00]     Clostridium difficile enterocolitis [A04.72]        Presenting Chief Complaint:  Refractory C. difficile colitis      Interval History:  Patient is a 67-year-old female with past medical history significant for recurrent C. difficile colitis for the past 9 years who presents from [facility] for consideration of fecal transplant.  Initially found to have leukocytosis and lactic acidosis with fever which has since resolved.  She is currently afebrile and hemodynamically stable.    5/15/2023:   Patient seen and examined. She is sleeping comfortably and has no acute complaints. 2 BM overnight. VSS. Hgb stable at 11.5.      Hospital Medications:  Current Facility-Administered Medications   Medication Dose Frequency Provider Last Rate Last Admin    calcium carbonate (Tums) chewable tab 500 mg  500 mg TID PRN JIM CopelandP.R.N.   500 mg at 05/14/23 1326    acetaminophen (Tylenol) tablet 650 mg  650 mg Q6HRS PRN Earnest Ramos M.D.        HYDROmorphone (Dilaudid) injection 0.5 mg  0.5 mg Q3HRS PRN Earnest Ramos M.D.        lactated ringers infusion (BOLUS)  500 mL Once PRN Earnest Ramos M.D.        ondansetron (ZOFRAN) syringe/vial injection 4 mg  4 mg Q4HRS PRN Earnest Ramos M.D.        ondansetron (ZOFRAN ODT) dispertab 4 mg  4 mg Q4HRS PRN Earnest Ramos M.D.        NS infusion   Continuous Earnest Ramos M.D. 83 mL/hr at 05/13/23 1816 New Bag at 05/13/23 1816    morphine 4 MG/ML injection 2 mg  " 2 mg Q3HRS PRN Earnest Ramos M.D.   2 mg at 05/14/23 2206    vancomycin 50 mg/mL oral soln 125 mg  125 mg Q6HRS Earnest Ramos M.D.   125 mg at 05/15/23 0615    Followed by    [START ON 5/22/2023] vancomycin 50 mg/mL oral soln 125 mg  125 mg Q12HR Earnest Ramos M.D.        Followed by    [START ON 5/29/2023] vancomycin 50 mg/mL oral soln 125 mg  125 mg Q24HR Earnest Ramos M.D.        Followed by    [START ON 6/5/2023] vancomycin 50 mg/mL oral soln 125 mg  125 mg Q48HRS Earnest Ramos M.D.        Followed by    [START ON 6/13/2023] vancomycin 50 mg/mL oral soln 125 mg  125 mg Q72HRS Earnest Ramos M.D.        Pharmacy Consult Request  1 Each PHARMACY TO DOSE Earnest Ramos M.D.        zolpidem (AMBIEN) tablet 5 mg  5 mg QHS Roxane Mak, A.P.R.N.   5 mg at 05/14/23 2206    sertraline (Zoloft) tablet 50 mg  50 mg Q EVENING Roxane Mak, A.P.R.N.   50 mg at 05/14/23 1723   Last reviewed on 5/13/2023  5:15 PM by Dottie Villa R.N.       Review of Systems:  Review of Systems   Constitutional:  Positive for chills, fever and malaise/fatigue.   HENT:  Negative for hearing loss.    Eyes:  Negative for blurred vision.   Respiratory:  Negative for cough and shortness of breath.    Cardiovascular:  Negative for chest pain and leg swelling.   Gastrointestinal:  Positive for abdominal pain, diarrhea and heartburn. Negative for blood in stool, constipation, melena, nausea and vomiting.   Genitourinary:  Negative for dysuria.   Musculoskeletal:  Negative for back pain.   Skin:  Negative for rash.   Neurological:  Negative for dizziness and weakness.   Psychiatric/Behavioral:  Negative for depression.    All other systems reviewed and are negative.        Vital signs:  Weight/BMI: Body mass index is 24.03 kg/m².  /76   Pulse 69   Temp 36.7 °C (98.1 °F) (Temporal)   Resp 17   Ht 1.626 m (5' 4\")   Wt 63.5 kg (139 lb 15.9 oz)   SpO2 95%   Vitals:    05/14/23 1135 05/14/23 1540 05/14/23 2024 " 05/15/23 0416   BP: 130/80 128/70 (!) 140/87 122/76   Pulse: 68 67 68 69   Resp: 18 18 17 17   Temp: 36.6 °C (97.9 °F) 36.3 °C (97.3 °F) 36.7 °C (98.1 °F) 36.7 °C (98.1 °F)   TempSrc: Temporal Temporal Temporal Temporal   SpO2: 96% 95% 92% 95%   Weight:       Height:         Oxygen Therapy:  Pulse Oximetry: 95 %, O2 (LPM): 0, O2 Delivery Device: None - Room Air    Intake/Output Summary (Last 24 hours) at 5/15/2023 0714  Last data filed at 5/14/2023 2027  Gross per 24 hour   Intake 420 ml   Output --   Net 420 ml         Physical Exam:  Physical Exam  Vitals and nursing note reviewed.   Constitutional:       General: She is not in acute distress.     Appearance: Normal appearance.   HENT:      Head: Normocephalic and atraumatic.      Right Ear: External ear normal.      Left Ear: External ear normal.      Nose: Nose normal.      Mouth/Throat:      Mouth: Mucous membranes are moist.      Pharynx: Oropharynx is clear.   Eyes:      General: No scleral icterus.  Cardiovascular:      Rate and Rhythm: Normal rate and regular rhythm.      Pulses: Normal pulses.      Heart sounds: Normal heart sounds.   Pulmonary:      Effort: Pulmonary effort is normal. No respiratory distress.      Breath sounds: Normal breath sounds.   Abdominal:      General: Abdomen is flat. Bowel sounds are normal. There is distension.      Palpations: Abdomen is soft.      Tenderness: There is abdominal tenderness.   Musculoskeletal:         General: Normal range of motion.      Cervical back: Normal range of motion.   Skin:     General: Skin is warm and dry.      Capillary Refill: Capillary refill takes less than 2 seconds.      Coloration: Skin is pale.   Neurological:      Mental Status: She is alert and oriented to person, place, and time.   Psychiatric:         Mood and Affect: Mood normal.         Behavior: Behavior normal.             Labs:  Recent Labs     05/13/23  0241 05/14/23  0535 05/15/23  0346   SODIUM 135 139 138   POTASSIUM 3.4* 3.5*  3.6   CHLORIDE 106 110 109   CO2 15* 18* 19*   BUN 12 8 6*   CREATININE 0.57 0.40* 0.44*   MAGNESIUM  --  1.8  --    PHOSPHORUS  --  2.1*  --    CALCIUM 8.4 8.3* 8.1*     Recent Labs     05/13/23  0241 05/14/23  0535 05/15/23  0346   ALTSGPT  --   --  11   ASTSGOT  --   --  14   ALKPHOSPHAT  --   --  65   TBILIRUBIN  --   --  0.2   GLUCOSE 72 84 94     Recent Labs     05/13/23  0241 05/14/23  0535 05/15/23  0346   WBC 7.9 6.8 6.5   ASTSGOT  --   --  14   ALTSGPT  --   --  11   ALKPHOSPHAT  --   --  65   TBILIRUBIN  --   --  0.2     Recent Labs     05/13/23  0241 05/14/23  0535 05/15/23  0346   RBC 3.78* 3.88* 3.85*   HEMOGLOBIN 11.2* 11.1* 11.5*   HEMATOCRIT 34.4* 34.6* 33.6*   PLATELETCT 190 172 213     Recent Results (from the past 24 hour(s))   CBC WITHOUT DIFFERENTIAL    Collection Time: 05/15/23  3:46 AM   Result Value Ref Range    WBC 6.5 4.8 - 10.8 K/uL    RBC 3.85 (L) 4.20 - 5.40 M/uL    Hemoglobin 11.5 (L) 12.0 - 16.0 g/dL    Hematocrit 33.6 (L) 37.0 - 47.0 %    MCV 87.3 81.4 - 97.8 fL    MCH 29.9 27.0 - 33.0 pg    MCHC 34.2 33.6 - 35.0 g/dL    RDW 41.1 35.9 - 50.0 fL    Platelet Count 213 164 - 446 K/uL    MPV 9.2 9.0 - 12.9 fL   Comp Metabolic Panel    Collection Time: 05/15/23  3:46 AM   Result Value Ref Range    Sodium 138 135 - 145 mmol/L    Potassium 3.6 3.6 - 5.5 mmol/L    Chloride 109 96 - 112 mmol/L    Co2 19 (L) 20 - 33 mmol/L    Anion Gap 10.0 7.0 - 16.0    Glucose 94 65 - 99 mg/dL    Bun 6 (L) 8 - 22 mg/dL    Creatinine 0.44 (L) 0.50 - 1.40 mg/dL    Calcium 8.1 (L) 8.5 - 10.5 mg/dL    AST(SGOT) 14 12 - 45 U/L    ALT(SGPT) 11 2 - 50 U/L    Alkaline Phosphatase 65 30 - 99 U/L    Total Bilirubin 0.2 0.1 - 1.5 mg/dL    Albumin 3.3 3.2 - 4.9 g/dL    Total Protein 5.5 (L) 6.0 - 8.2 g/dL    Globulin 2.2 1.9 - 3.5 g/dL    A-G Ratio 1.5 g/dL   CORRECTED CALCIUM    Collection Time: 05/15/23  3:46 AM   Result Value Ref Range    Correct Calcium 8.7 8.5 - 10.5 mg/dL   ESTIMATED GFR    Collection Time:  05/15/23  3:46 AM   Result Value Ref Range    GFR (CKD-EPI) 106 >60 mL/min/1.73 m 2       Radiology Review:  No orders to display         MDM (Data Review):   -Records reviewed and summarized in current documentation  -I personally reviewed and interpreted the laboratory results  -I personally reviewed the radiology images    Assessment/Recommendations:  Impressions:  Recurrent C. difficile colitis  History of diverticulitis  Hypertension  Hyperlipidemia  Left foot surgery 5/22  Insomnia    Recommendations:  GI team will do fecal transplant this week  Stool is ordered, anticipate delivery 5/16  We will plan for procedure 5/17  Vancomycin will need to be held 24 hours prior  GoLytely the evening before     GI team will continue to follow.     Plan discussed with patient, Dr. Luna, and Dr. Richards    Core Quality Measures   Reviewed items::  Labs, Medications and Radiology reports reviewed

## 2023-05-15 NOTE — CARE PLAN
The patient is Stable - Low risk of patient condition declining or worsening    Shift Goals  Clinical Goals: Pain control, rest  Patient Goals: Rest  Family Goals: No family currently at the bedside    Progress made toward(s) clinical / shift goals:  Pt resting, pain managed w/ medication per MD order.     Patient is not progressing towards the following goals:

## 2023-05-15 NOTE — PROGRESS NOTES
Hospital Medicine Daily Progress Note    Date of Service  5/15/2023    Chief Complaint  Kendra Winters is a 67 y.o. female admitted 5/13/2023 with No chief complaint on file.      Hospital Course  This is a 87-year-old female with a past medical history significant for hypertension, hyperlipidemia, depression, multiple episodes of C. difficile colitis for the last 8 years; on chronic oral vancomycin.  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    Upon presentation, she noted to be febrile, tachypnea, tachycardia, WBC 15.2, lactic acid 2.4, CT scan of abdomen pelvis with colitis, inflammatory versus ischemic' also swirled appearance of the mesenteric vessels showing concern for partial or evolving cecal volvulus.    Blood culture x2 ordered ; General surgery  has evaluated initially for volvulus..  Per general surgery, no surgical intervention; GI was consulted, who recommended transferring the patient to [facility].    Interval events:  -- No acute events overnight.  Vital sign has been stable, patient is alert, awake, answering questions appropriately.  GI evaluated, recommended holding vancomycin for 24 hours  --K at 3.5, will replete as needed   -- Fecal transplant on Wednesday, need to be hold oral vancomycin for 24 hours prior to this.  GI following, will follow their recommendation.  -- alia si slow , will replete as needed     5/15:  -- No acute events overnight, patient has been stable,patient is alert, awake and answering questions with appropriately  -- Plan for  fecal transplant on Wednesday, still has been ordered, will hopefully it is tomorrow.    --hb at 11.5, monitor  -- plan of care has been discussed with the GI team.    I have discussed this patient's plan of care and discharge plan at IDT rounds today with Case Management, Nursing, Nursing leadership, and other  members of the IDT team.    Consultants/Specialty  GI    Code Status  Full Code    Disposition  The patient is not medically cleared for discharge to home or a post-acute facility.  Anticipate discharge to: home with organized home healthcare and close outpatient follow-up    I have placed the appropriate orders for post-discharge needs.    Review of Systems  ROS     Physical Exam  Temp:  [36.3 °C (97.3 °F)-36.7 °C (98.1 °F)] 36.6 °C (97.9 °F)  Pulse:  [67-69] 67  Resp:  [16-18] 16  BP: (122-148)/(70-87) 148/80  SpO2:  [92 %-95 %] 94 %    Physical Exam    Fluids    Intake/Output Summary (Last 24 hours) at 5/15/2023 1349  Last data filed at 5/14/2023 2027  Gross per 24 hour   Intake 300 ml   Output --   Net 300 ml         Laboratory  Recent Labs     05/13/23  0241 05/14/23  0535 05/15/23  0346   WBC 7.9 6.8 6.5   RBC 3.78* 3.88* 3.85*   HEMOGLOBIN 11.2* 11.1* 11.5*   HEMATOCRIT 34.4* 34.6* 33.6*   MCV 91.0 89.2 87.3   MCH 29.6 28.6 29.9   MCHC 32.6* 32.1* 34.2   RDW 45.3 42.9 41.1   PLATELETCT 190 172 213   MPV 9.1 9.0 9.2       Recent Labs     05/13/23  0241 05/14/23  0535 05/15/23  0346   SODIUM 135 139 138   POTASSIUM 3.4* 3.5* 3.6   CHLORIDE 106 110 109   CO2 15* 18* 19*   GLUCOSE 72 84 94   BUN 12 8 6*   CREATININE 0.57 0.40* 0.44*   CALCIUM 8.4 8.3* 8.1*                       Imaging  No orders to display          Assessment/Plan  Cecal volvulus (HCC)- (present on admission)  Assessment & Plan  - CT showing swirled appearance of the mesenteric vessels and there was a concern of partial or evolving cecal volvulus.  -General Surgery consulted, Dr. Zuniga, presentation not consistent with cecal volvulus, no further surgical needs.    Essential hypertension- (present on admission)  Assessment & Plan  Blood pressure well controlled, continue lisinopril 10 mg p.o. daily    Clostridium difficile enterocolitis- (present on admission)  Assessment & Plan  CT of the abdomen show findings consistent with colitis.  -She has  history of chronic C. difficile colitis for the last 8 years.  Status post fecal transplantation's x3 and on chronic p.o. vancomycin for the last year.   -- GI consulted, plan for fecal transplant on Wednesday   Stool will arrive on 5/16    Hypokalemia- (present on admission)  Assessment & Plan  K 40 MeqX 1    Hypercholesteremia- (present on admission)  Assessment & Plan  statin         VTE prophylaxis: scd  I have performed a physical exam and reviewed and updated ROS and Plan today (5/15/2023). In review of yesterday's note (5/14/2023), there are no changes except as documented above.

## 2023-05-16 PROCEDURE — 99232 SBSQ HOSP IP/OBS MODERATE 35: CPT | Performed by: STUDENT IN AN ORGANIZED HEALTH CARE EDUCATION/TRAINING PROGRAM

## 2023-05-16 PROCEDURE — 700102 HCHG RX REV CODE 250 W/ 637 OVERRIDE(OP): Performed by: INTERNAL MEDICINE

## 2023-05-16 PROCEDURE — 700102 HCHG RX REV CODE 250 W/ 637 OVERRIDE(OP): Performed by: STUDENT IN AN ORGANIZED HEALTH CARE EDUCATION/TRAINING PROGRAM

## 2023-05-16 PROCEDURE — 700111 HCHG RX REV CODE 636 W/ 250 OVERRIDE (IP): Performed by: STUDENT IN AN ORGANIZED HEALTH CARE EDUCATION/TRAINING PROGRAM

## 2023-05-16 PROCEDURE — A9270 NON-COVERED ITEM OR SERVICE: HCPCS | Performed by: NURSE PRACTITIONER

## 2023-05-16 PROCEDURE — 99232 SBSQ HOSP IP/OBS MODERATE 35: CPT | Performed by: NURSE PRACTITIONER

## 2023-05-16 PROCEDURE — 770020 HCHG ROOM/CARE - TELE (206)

## 2023-05-16 PROCEDURE — A9270 NON-COVERED ITEM OR SERVICE: HCPCS | Performed by: INTERNAL MEDICINE

## 2023-05-16 PROCEDURE — 700101 HCHG RX REV CODE 250: Performed by: NURSE PRACTITIONER

## 2023-05-16 PROCEDURE — 700111 HCHG RX REV CODE 636 W/ 250 OVERRIDE (IP): Performed by: INTERNAL MEDICINE

## 2023-05-16 PROCEDURE — A9270 NON-COVERED ITEM OR SERVICE: HCPCS | Performed by: STUDENT IN AN ORGANIZED HEALTH CARE EDUCATION/TRAINING PROGRAM

## 2023-05-16 PROCEDURE — 700102 HCHG RX REV CODE 250 W/ 637 OVERRIDE(OP): Performed by: NURSE PRACTITIONER

## 2023-05-16 RX ORDER — ATORVASTATIN CALCIUM 20 MG/1
20 TABLET, FILM COATED ORAL EVERY EVENING
Status: DISCONTINUED | OUTPATIENT
Start: 2023-05-16 | End: 2023-05-18 | Stop reason: HOSPADM

## 2023-05-16 RX ORDER — POTASSIUM CHLORIDE 20 MEQ/1
40 TABLET, EXTENDED RELEASE ORAL ONCE
Status: COMPLETED | OUTPATIENT
Start: 2023-05-16 | End: 2023-05-16

## 2023-05-16 RX ORDER — BUTALBITAL, ACETAMINOPHEN AND CAFFEINE 50; 325; 40 MG/1; MG/1; MG/1
1 TABLET ORAL EVERY 6 HOURS PRN
Status: DISCONTINUED | OUTPATIENT
Start: 2023-05-16 | End: 2023-05-18 | Stop reason: HOSPADM

## 2023-05-16 RX ORDER — ENOXAPARIN SODIUM 100 MG/ML
40 INJECTION SUBCUTANEOUS DAILY
Status: DISCONTINUED | OUTPATIENT
Start: 2023-05-16 | End: 2023-05-18 | Stop reason: HOSPADM

## 2023-05-16 RX ORDER — LISINOPRIL 5 MG/1
5 TABLET ORAL
Status: DISCONTINUED | OUTPATIENT
Start: 2023-05-16 | End: 2023-05-18 | Stop reason: HOSPADM

## 2023-05-16 RX ADMIN — LISINOPRIL 5 MG: 5 TABLET ORAL at 18:13

## 2023-05-16 RX ADMIN — ACETAMINOPHEN 650 MG: 325 TABLET, FILM COATED ORAL at 10:36

## 2023-05-16 RX ADMIN — POLYETHYLENE GLYCOL-3350 AND ELECTROLYTES 4 L: 236; 6.74; 5.86; 2.97; 22.74 POWDER, FOR SOLUTION ORAL at 18:13

## 2023-05-16 RX ADMIN — SERTRALINE 50 MG: 50 TABLET, FILM COATED ORAL at 17:12

## 2023-05-16 RX ADMIN — VANCOMYCIN HYDROCHLORIDE 125 MG: 5 INJECTION, POWDER, LYOPHILIZED, FOR SOLUTION INTRAVENOUS at 06:19

## 2023-05-16 RX ADMIN — ENOXAPARIN SODIUM 40 MG: 100 INJECTION SUBCUTANEOUS at 17:11

## 2023-05-16 RX ADMIN — ONDANSETRON 4 MG: 2 INJECTION INTRAMUSCULAR; INTRAVENOUS at 22:42

## 2023-05-16 RX ADMIN — POTASSIUM CHLORIDE 40 MEQ: 1500 TABLET, EXTENDED RELEASE ORAL at 18:13

## 2023-05-16 RX ADMIN — ZOLPIDEM TARTRATE 5 MG: 5 TABLET ORAL at 22:42

## 2023-05-16 RX ADMIN — ATORVASTATIN CALCIUM 20 MG: 20 TABLET, FILM COATED ORAL at 18:13

## 2023-05-16 ASSESSMENT — ENCOUNTER SYMPTOMS
ABDOMINAL PAIN: 1
MYALGIAS: 0
BLOOD IN STOOL: 0
NAUSEA: 0
SORE THROAT: 0
DIZZINESS: 0
CONSTIPATION: 0
HEARTBURN: 1
COUGH: 0
SHORTNESS OF BREATH: 0
VOMITING: 0
FEVER: 0
BLURRED VISION: 0
WEAKNESS: 0
FLANK PAIN: 0
DEPRESSION: 0
NERVOUS/ANXIOUS: 0
CHILLS: 0
BACK PAIN: 0
DIARRHEA: 1

## 2023-05-16 ASSESSMENT — PAIN DESCRIPTION - PAIN TYPE
TYPE: ACUTE PAIN

## 2023-05-16 ASSESSMENT — LIFESTYLE VARIABLES: SUBSTANCE_ABUSE: 0

## 2023-05-16 NOTE — CARE PLAN
The patient is Stable - Low risk of patient condition declining or worsening    Shift Goals  Clinical Goals: pain control  Patient Goals: Pain control, rest  Family Goals: No family currently present    Progress made toward(s) clinical / shift goals:  Pain managed per MAR.  Patient educated on importance of oob activity.      Problem: Knowledge Deficit - Standard  Goal: Patient and family/care givers will demonstrate understanding of plan of care, disease process/condition, diagnostic tests and medications  Outcome: Progressing     Problem: Pain - Standard  Goal: Alleviation of pain or a reduction in pain to the patient’s comfort goal  Outcome: Progressing

## 2023-05-16 NOTE — PROGRESS NOTES
..Gastroenterology Progress Note               Author:  ANG Copeland Date & Time Created: 5/16/2023 4:50 PM       Patient ID:  Name:             Kendra Jack  YOB: 1955  Age:                 67 y.o.  female  MRN:               [id number]        Medical Decision Making, by Problem:  Active Hospital Problems    Diagnosis     Cecal volvulus (HCC) [K56.2]     Hypokalemia [E87.6]     Essential hypertension [I10]     Hypercholesteremia [E78.00]     Clostridium difficile enterocolitis [A04.72]        Presenting Chief Complaint:  Refractory C. difficile colitis      Interval History:  Patient is a 67-year-old female with past medical history significant for recurrent C. difficile colitis for the past 9 years who presents from [facility] for consideration of fecal transplant.  Initially found to have leukocytosis and lactic acidosis with fever which has since resolved.  She is currently afebrile and hemodynamically stable.    5/15/2023:   Patient seen and examined. She is sleeping comfortably and has no acute complaints. 2 BM overnight. VSS. Hgb stable at 11.5.    5/16/2023: Patient seen at bedside.  She is up in chair sitting comfortably.  No acute complaints.  Fecal microbiota has arrived to facility and plan for FMT tomorrow.  Discussed with pharmacy who held vancomycin starting this afternoon at 12:00.  Updated patient on plan for FMT tomorrow.  She is overjoyed and remains agreeable to proceed with FMT tomorrow.    Hospital Medications:  Current Facility-Administered Medications   Medication Dose Frequency Provider Last Rate Last Admin    [START ON 5/17/2023] vancomycin 50 mg/mL oral soln 125 mg  125 mg Q6HRS RICCI Early.P.R.N.        Followed by    [START ON 5/24/2023] vancomycin 50 mg/mL oral soln 125 mg  125 mg Q12HR Krupa Angeles, A.P.R.N.        Followed by    [START ON 5/31/2023] vancomycin 50 mg/mL oral soln 125 mg  125 mg Q24HR Krupa Angeles,  A.P.R.N.        Followed by    [START ON 6/7/2023] vancomycin 50 mg/mL oral soln 125 mg  125 mg Q48HRS Krupa Angeles A.P.R.N.        Followed by    [START ON 6/15/2023] vancomycin 50 mg/mL oral soln 125 mg  125 mg Q72HRS Krupa Angeles A.P.R.N.        enoxaparin (Lovenox) inj 40 mg  40 mg DAILY AT 1800 Abdullahi Lyman M.D.        Pharmacy Consult Request   PHARMACY TO DOSE Shahnaz Luna M.D.        calcium carbonate (Tums) chewable tab 500 mg  500 mg TID PRN Sintia Ferguson A.P.R.N.   500 mg at 05/14/23 1326    acetaminophen (Tylenol) tablet 650 mg  650 mg Q6HRS PRN Earnest Ramos M.D.   650 mg at 05/16/23 1036    HYDROmorphone (Dilaudid) injection 0.5 mg  0.5 mg Q3HRS PRN Earnest Ramos M.D.        lactated ringers infusion (BOLUS)  500 mL Once PRN Earnest Ramos M.D.        ondansetron (ZOFRAN) syringe/vial injection 4 mg  4 mg Q4HRS PRN Earnest Ramos M.D.        ondansetron (ZOFRAN ODT) dispertab 4 mg  4 mg Q4HRS PRN Earnest Ramos M.D.        NS infusion   Continuous Earnest Ramos M.D.   Stopped at 05/15/23 2100    morphine 4 MG/ML injection 2 mg  2 mg Q3HRS PRN Earnest Ramos M.D.   2 mg at 05/15/23 2237    Pharmacy Consult Request  1 Each PHARMACY TO DOSE Earnest Ramos M.D.        zolpidem (AMBIEN) tablet 5 mg  5 mg QHS Roxane Mak A.P.R.N.   5 mg at 05/15/23 2239    sertraline (Zoloft) tablet 50 mg  50 mg Q EVENING Roxane Mak, A.P.R.N.   50 mg at 05/15/23 1717   Last reviewed on 5/13/2023  5:15 PM by Dottie Villa R.N.       Review of Systems:  Review of Systems   Constitutional:  Positive for malaise/fatigue. Negative for chills and fever.   HENT:  Negative for congestion and sore throat.    Eyes:  Negative for blurred vision.   Respiratory:  Negative for cough and shortness of breath.    Cardiovascular:  Negative for chest pain and leg swelling.   Gastrointestinal:  Positive for abdominal pain, diarrhea and heartburn. Negative for blood in stool, constipation, melena,  "nausea and vomiting.   Genitourinary:  Negative for dysuria and flank pain.   Musculoskeletal:  Negative for back pain and myalgias.   Skin:  Negative for rash.   Neurological:  Negative for dizziness and weakness.   Psychiatric/Behavioral:  Negative for depression and substance abuse. The patient is not nervous/anxious.    All other systems reviewed and are negative.        Vital signs:  Weight/BMI: Body mass index is 24.03 kg/m².  /74   Pulse 68   Temp 36.6 °C (97.9 °F) (Temporal)   Resp 17   Ht 1.626 m (5' 4\")   Wt 63.5 kg (139 lb 15.9 oz)   SpO2 91%   Vitals:    05/15/23 1610 05/15/23 2017 05/16/23 0330 05/16/23 0825   BP: (!) 145/87 (!) 140/89 110/77 124/74   Pulse: 61 83 75 68   Resp: 16 16 16 17   Temp: 36.2 °C (97.2 °F) 36.6 °C (97.9 °F) 37.1 °C (98.8 °F) 36.6 °C (97.9 °F)   TempSrc: Temporal Temporal Temporal Temporal   SpO2: 98% 98% 96% 91%   Weight:       Height:         Oxygen Therapy:  Pulse Oximetry: 91 %, O2 (LPM): 0, O2 Delivery Device: None - Room Air    Intake/Output Summary (Last 24 hours) at 5/16/2023 1650  Last data filed at 5/16/2023 1000  Gross per 24 hour   Intake 120 ml   Output --   Net 120 ml           Physical Exam:  Physical Exam  Vitals and nursing note reviewed.   Constitutional:       General: She is not in acute distress.     Appearance: Normal appearance. She is not ill-appearing.   HENT:      Head: Normocephalic and atraumatic.      Nose: Nose normal. No congestion.      Mouth/Throat:      Mouth: Mucous membranes are moist.      Pharynx: Oropharynx is clear. No oropharyngeal exudate.   Eyes:      General: No scleral icterus.  Cardiovascular:      Rate and Rhythm: Normal rate and regular rhythm.      Pulses: Normal pulses.      Heart sounds: Normal heart sounds. No murmur heard.  Pulmonary:      Effort: Pulmonary effort is normal. No respiratory distress.      Breath sounds: Normal breath sounds.   Abdominal:      General: Abdomen is flat. Bowel sounds are normal. There " is distension.      Palpations: Abdomen is soft.      Tenderness: There is abdominal tenderness. There is no guarding.   Musculoskeletal:         General: Normal range of motion.      Cervical back: Normal range of motion.   Skin:     General: Skin is warm and dry.      Capillary Refill: Capillary refill takes less than 2 seconds.      Coloration: Skin is pale.   Neurological:      Mental Status: She is alert and oriented to person, place, and time.   Psychiatric:         Mood and Affect: Mood normal.         Behavior: Behavior normal.         Thought Content: Thought content normal.         Judgment: Judgment normal.             Labs:  Recent Labs     05/14/23  0535 05/15/23  0346   SODIUM 139 138   POTASSIUM 3.5* 3.6   CHLORIDE 110 109   CO2 18* 19*   BUN 8 6*   CREATININE 0.40* 0.44*   MAGNESIUM 1.8  --    PHOSPHORUS 2.1*  --    CALCIUM 8.3* 8.1*       Recent Labs     05/14/23  0535 05/15/23  0346   ALTSGPT  --  11   ASTSGOT  --  14   ALKPHOSPHAT  --  65   TBILIRUBIN  --  0.2   GLUCOSE 84 94       Recent Labs     05/14/23  0535 05/15/23  0346   WBC 6.8 6.5   ASTSGOT  --  14   ALTSGPT  --  11   ALKPHOSPHAT  --  65   TBILIRUBIN  --  0.2       Recent Labs     05/14/23  0535 05/15/23  0346   RBC 3.88* 3.85*   HEMOGLOBIN 11.1* 11.5*   HEMATOCRIT 34.6* 33.6*   PLATELETCT 172 213       No results found for this or any previous visit (from the past 24 hour(s)).      Radiology Review:  No orders to display         MDM (Data Review):   -Records reviewed and summarized in current documentation  -I personally reviewed and interpreted the laboratory results  -I personally reviewed the radiology images    Assessment/Recommendations:  Impressions:  Recurrent C. difficile colitis  History of diverticulitis  Hypertension  Hyperlipidemia  Left foot surgery 5/22  Insomnia    Recommendations:  Fecal microbiota ordered and was delivered 4/16/2023.  Held vancomycin 5/16/2023-last dose was 5/16/2023 at 0619  Bowel prep tonight  Clear  liquid diet, NPO at midnight  Plan for FMT 5/17/2023        Plan discussed with patient, Dr. Rowley, Dr. Lyman.     Core Quality Measures   Reviewed items::  Labs, Medications and Radiology reports reviewed

## 2023-05-16 NOTE — PROGRESS NOTES
Received report from day shift RN. Assumed patient care. Assessment completed at beginning of shift.     Patient is A+O x4.  Patient is on 0L.   Tolerating GI soft diet. Denies N/V.  Denies chest pain or SOB.  Pain 5 on a 0-10 pain scale.   + void, - BM. Last BM 5/14.  Skin per flowsheets.   Patient ambulates independently.   Plan: Fecal transplant on 5/17.    Plan of care discussed, all questions answered. Educated on the importance of calling before getting OOB and pt verbalizes understanding. Educated regarding importance of oral care. Oral care kit at bedside. Call light is within reach, treaded slipper socks on, bed in lowest/ locked position, hourly rounding in place, all needs met at this time

## 2023-05-16 NOTE — CARE PLAN
The patient is Stable - Low risk of patient condition declining or worsening    Shift Goals  Clinical Goals: Pain control, rest  Patient Goals: Pain control, rest  Family Goals: No family currently present    Progress made toward(s) clinical / shift goals:    Patient verbalized understanding of plan of care. Patient's pain is managed with current pain medications per MAR. Patient gets out of bed independently with a steady gait. Skin interventions per flowsheets.    Patient is not progressing towards the following goals:

## 2023-05-17 ENCOUNTER — ANESTHESIA (OUTPATIENT)
Dept: SURGERY | Facility: MEDICAL CENTER | Age: 68
DRG: 373 | End: 2023-05-17
Payer: COMMERCIAL

## 2023-05-17 ENCOUNTER — ANESTHESIA EVENT (OUTPATIENT)
Dept: SURGERY | Facility: MEDICAL CENTER | Age: 68
DRG: 373 | End: 2023-05-17
Payer: COMMERCIAL

## 2023-05-17 LAB
ANION GAP SERPL CALC-SCNC: 11 MMOL/L (ref 7–16)
BACTERIA BLD CULT: NORMAL
BACTERIA BLD CULT: NORMAL
BASOPHILS # BLD AUTO: 0.8 % (ref 0–1.8)
BASOPHILS # BLD: 0.04 K/UL (ref 0–0.12)
BUN SERPL-MCNC: 5 MG/DL (ref 8–22)
CALCIUM SERPL-MCNC: 8.7 MG/DL (ref 8.5–10.5)
CHLORIDE SERPL-SCNC: 108 MMOL/L (ref 96–112)
CO2 SERPL-SCNC: 21 MMOL/L (ref 20–33)
CREAT SERPL-MCNC: 0.48 MG/DL (ref 0.5–1.4)
EOSINOPHIL # BLD AUTO: 0.23 K/UL (ref 0–0.51)
EOSINOPHIL NFR BLD: 4.3 % (ref 0–6.9)
ERYTHROCYTE [DISTWIDTH] IN BLOOD BY AUTOMATED COUNT: 40.9 FL (ref 35.9–50)
GFR SERPLBLD CREATININE-BSD FMLA CKD-EPI: 103 ML/MIN/1.73 M 2
GLUCOSE SERPL-MCNC: 82 MG/DL (ref 65–99)
HCT VFR BLD AUTO: 36.3 % (ref 37–47)
HGB BLD-MCNC: 12 G/DL (ref 12–16)
IMM GRANULOCYTES # BLD AUTO: 0.06 K/UL (ref 0–0.11)
IMM GRANULOCYTES NFR BLD AUTO: 1.1 % (ref 0–0.9)
LYMPHOCYTES # BLD AUTO: 2.01 K/UL (ref 1–4.8)
LYMPHOCYTES NFR BLD: 37.9 % (ref 22–41)
MAGNESIUM SERPL-MCNC: 1.8 MG/DL (ref 1.5–2.5)
MCH RBC QN AUTO: 28.8 PG (ref 27–33)
MCHC RBC AUTO-ENTMCNC: 33.1 G/DL (ref 33.6–35)
MCV RBC AUTO: 87.3 FL (ref 81.4–97.8)
MONOCYTES # BLD AUTO: 0.59 K/UL (ref 0–0.85)
MONOCYTES NFR BLD AUTO: 11.1 % (ref 0–13.4)
NEUTROPHILS # BLD AUTO: 2.37 K/UL (ref 2–7.15)
NEUTROPHILS NFR BLD: 44.8 % (ref 44–72)
NRBC # BLD AUTO: 0 K/UL
NRBC BLD-RTO: 0 /100 WBC
PHOSPHATE SERPL-MCNC: 3.8 MG/DL (ref 2.5–4.5)
PLATELET # BLD AUTO: 264 K/UL (ref 164–446)
PMV BLD AUTO: 9.4 FL (ref 9–12.9)
POTASSIUM SERPL-SCNC: 3.9 MMOL/L (ref 3.6–5.5)
RBC # BLD AUTO: 4.16 M/UL (ref 4.2–5.4)
SIGNIFICANT IND 70042: NORMAL
SIGNIFICANT IND 70042: NORMAL
SITE SITE: NORMAL
SITE SITE: NORMAL
SODIUM SERPL-SCNC: 140 MMOL/L (ref 135–145)
SOURCE SOURCE: NORMAL
SOURCE SOURCE: NORMAL
WBC # BLD AUTO: 5.3 K/UL (ref 4.8–10.8)

## 2023-05-17 PROCEDURE — 160035 HCHG PACU - 1ST 60 MINS PHASE I: Performed by: INTERNAL MEDICINE

## 2023-05-17 PROCEDURE — 84100 ASSAY OF PHOSPHORUS: CPT

## 2023-05-17 PROCEDURE — 700102 HCHG RX REV CODE 250 W/ 637 OVERRIDE(OP): Performed by: NURSE PRACTITIONER

## 2023-05-17 PROCEDURE — A9270 NON-COVERED ITEM OR SERVICE: HCPCS

## 2023-05-17 PROCEDURE — 160048 HCHG OR STATISTICAL LEVEL 1-5: Performed by: INTERNAL MEDICINE

## 2023-05-17 PROCEDURE — 160009 HCHG ANES TIME/MIN: Performed by: INTERNAL MEDICINE

## 2023-05-17 PROCEDURE — 160002 HCHG RECOVERY MINUTES (STAT): Performed by: INTERNAL MEDICINE

## 2023-05-17 PROCEDURE — A9270 NON-COVERED ITEM OR SERVICE: HCPCS | Performed by: NURSE PRACTITIONER

## 2023-05-17 PROCEDURE — 700102 HCHG RX REV CODE 250 W/ 637 OVERRIDE(OP): Performed by: STUDENT IN AN ORGANIZED HEALTH CARE EDUCATION/TRAINING PROGRAM

## 2023-05-17 PROCEDURE — 700111 HCHG RX REV CODE 636 W/ 250 OVERRIDE (IP): Performed by: ANESTHESIOLOGY

## 2023-05-17 PROCEDURE — 700111 HCHG RX REV CODE 636 W/ 250 OVERRIDE (IP): Performed by: STUDENT IN AN ORGANIZED HEALTH CARE EDUCATION/TRAINING PROGRAM

## 2023-05-17 PROCEDURE — 85025 COMPLETE CBC W/AUTO DIFF WBC: CPT

## 2023-05-17 PROCEDURE — 700102 HCHG RX REV CODE 250 W/ 637 OVERRIDE(OP)

## 2023-05-17 PROCEDURE — 00811 ANES LWR INTST NDSC NOS: CPT | Performed by: ANESTHESIOLOGY

## 2023-05-17 PROCEDURE — 80048 BASIC METABOLIC PNL TOTAL CA: CPT

## 2023-05-17 PROCEDURE — 770020 HCHG ROOM/CARE - TELE (206)

## 2023-05-17 PROCEDURE — 700105 HCHG RX REV CODE 258: Performed by: ANESTHESIOLOGY

## 2023-05-17 PROCEDURE — XW0H7X8 INTRODUCTION OF BROAD CONSORTIUM MICROBIOTA-BASED LIVE BIOTHERAPEUTIC SUSPENSION INTO LOWER GI, VIA NATURAL OR ARTIFICIAL OPENING, NEW TECHNOLOGY GROUP 8: ICD-10-PCS | Performed by: INTERNAL MEDICINE

## 2023-05-17 PROCEDURE — 502000 HCHG MISC OR IMPLANTS RC 0278: Performed by: INTERNAL MEDICINE

## 2023-05-17 PROCEDURE — 160027 HCHG SURGERY MINUTES - 1ST 30 MINS LEVEL 2: Performed by: INTERNAL MEDICINE

## 2023-05-17 PROCEDURE — 700111 HCHG RX REV CODE 636 W/ 250 OVERRIDE (IP): Performed by: INTERNAL MEDICINE

## 2023-05-17 PROCEDURE — 93005 ELECTROCARDIOGRAM TRACING: CPT | Performed by: INTERNAL MEDICINE

## 2023-05-17 PROCEDURE — 700101 HCHG RX REV CODE 250: Performed by: ANESTHESIOLOGY

## 2023-05-17 PROCEDURE — 99232 SBSQ HOSP IP/OBS MODERATE 35: CPT | Performed by: STUDENT IN AN ORGANIZED HEALTH CARE EDUCATION/TRAINING PROGRAM

## 2023-05-17 PROCEDURE — A9270 NON-COVERED ITEM OR SERVICE: HCPCS | Performed by: STUDENT IN AN ORGANIZED HEALTH CARE EDUCATION/TRAINING PROGRAM

## 2023-05-17 PROCEDURE — 83735 ASSAY OF MAGNESIUM: CPT

## 2023-05-17 PROCEDURE — 45378 DIAGNOSTIC COLONOSCOPY: CPT | Performed by: INTERNAL MEDICINE

## 2023-05-17 DEVICE — IMPLANTABLE DEVICE: Type: IMPLANTABLE DEVICE | Site: RECTUM | Status: FUNCTIONAL

## 2023-05-17 RX ORDER — HALOPERIDOL 5 MG/ML
1 INJECTION INTRAMUSCULAR
Status: DISCONTINUED | OUTPATIENT
Start: 2023-05-17 | End: 2023-05-17 | Stop reason: HOSPADM

## 2023-05-17 RX ORDER — HYDRALAZINE HYDROCHLORIDE 20 MG/ML
5 INJECTION INTRAMUSCULAR; INTRAVENOUS
Status: DISCONTINUED | OUTPATIENT
Start: 2023-05-17 | End: 2023-05-17 | Stop reason: HOSPADM

## 2023-05-17 RX ORDER — ONDANSETRON 2 MG/ML
4 INJECTION INTRAMUSCULAR; INTRAVENOUS
Status: DISCONTINUED | OUTPATIENT
Start: 2023-05-17 | End: 2023-05-17 | Stop reason: HOSPADM

## 2023-05-17 RX ORDER — AMLODIPINE BESYLATE 10 MG/1
5 TABLET ORAL
Status: DISCONTINUED | OUTPATIENT
Start: 2023-05-18 | End: 2023-05-18 | Stop reason: HOSPADM

## 2023-05-17 RX ORDER — EPHEDRINE SULFATE 50 MG/ML
5 INJECTION, SOLUTION INTRAVENOUS
Status: DISCONTINUED | OUTPATIENT
Start: 2023-05-17 | End: 2023-05-17 | Stop reason: HOSPADM

## 2023-05-17 RX ORDER — LABETALOL HYDROCHLORIDE 5 MG/ML
5 INJECTION, SOLUTION INTRAVENOUS
Status: DISCONTINUED | OUTPATIENT
Start: 2023-05-17 | End: 2023-05-17 | Stop reason: HOSPADM

## 2023-05-17 RX ORDER — SODIUM CHLORIDE, SODIUM LACTATE, POTASSIUM CHLORIDE, CALCIUM CHLORIDE 600; 310; 30; 20 MG/100ML; MG/100ML; MG/100ML; MG/100ML
INJECTION, SOLUTION INTRAVENOUS CONTINUOUS
Status: DISCONTINUED | OUTPATIENT
Start: 2023-05-17 | End: 2023-05-17 | Stop reason: HOSPADM

## 2023-05-17 RX ORDER — LOPERAMIDE HYDROCHLORIDE 2 MG/1
2 CAPSULE ORAL ONCE
Status: COMPLETED | OUTPATIENT
Start: 2023-05-17 | End: 2023-05-17

## 2023-05-17 RX ORDER — LIDOCAINE HYDROCHLORIDE 20 MG/ML
INJECTION, SOLUTION EPIDURAL; INFILTRATION; INTRACAUDAL; PERINEURAL PRN
Status: DISCONTINUED | OUTPATIENT
Start: 2023-05-17 | End: 2023-05-17 | Stop reason: SURG

## 2023-05-17 RX ORDER — DIPHENHYDRAMINE HYDROCHLORIDE 50 MG/ML
12.5 INJECTION INTRAMUSCULAR; INTRAVENOUS
Status: DISCONTINUED | OUTPATIENT
Start: 2023-05-17 | End: 2023-05-17 | Stop reason: HOSPADM

## 2023-05-17 RX ORDER — SODIUM CHLORIDE, SODIUM LACTATE, POTASSIUM CHLORIDE, CALCIUM CHLORIDE 600; 310; 30; 20 MG/100ML; MG/100ML; MG/100ML; MG/100ML
INJECTION, SOLUTION INTRAVENOUS
Status: DISCONTINUED | OUTPATIENT
Start: 2023-05-17 | End: 2023-05-17 | Stop reason: SURG

## 2023-05-17 RX ADMIN — SERTRALINE 50 MG: 50 TABLET, FILM COATED ORAL at 17:53

## 2023-05-17 RX ADMIN — ATORVASTATIN CALCIUM 20 MG: 20 TABLET, FILM COATED ORAL at 17:53

## 2023-05-17 RX ADMIN — MORPHINE SULFATE 2 MG: 4 INJECTION INTRAVENOUS at 21:01

## 2023-05-17 RX ADMIN — ENOXAPARIN SODIUM 40 MG: 100 INJECTION SUBCUTANEOUS at 17:53

## 2023-05-17 RX ADMIN — PROPOFOL 30 MG: 10 INJECTION, EMULSION INTRAVENOUS at 16:22

## 2023-05-17 RX ADMIN — PROPOFOL 30 MG: 10 INJECTION, EMULSION INTRAVENOUS at 16:28

## 2023-05-17 RX ADMIN — PROPOFOL 30 MG: 10 INJECTION, EMULSION INTRAVENOUS at 16:32

## 2023-05-17 RX ADMIN — PROPOFOL 30 MG: 10 INJECTION, EMULSION INTRAVENOUS at 16:25

## 2023-05-17 RX ADMIN — PROPOFOL 50 MG: 10 INJECTION, EMULSION INTRAVENOUS at 16:10

## 2023-05-17 RX ADMIN — LIDOCAINE HYDROCHLORIDE 100 MG: 20 INJECTION, SOLUTION EPIDURAL; INFILTRATION; INTRACAUDAL at 16:11

## 2023-05-17 RX ADMIN — PROPOFOL 30 MG: 10 INJECTION, EMULSION INTRAVENOUS at 16:16

## 2023-05-17 RX ADMIN — ZOLPIDEM TARTRATE 5 MG: 5 TABLET ORAL at 21:01

## 2023-05-17 RX ADMIN — HYDRALAZINE HYDROCHLORIDE 5 MG: 20 INJECTION, SOLUTION INTRAMUSCULAR; INTRAVENOUS at 17:21

## 2023-05-17 RX ADMIN — LISINOPRIL 5 MG: 5 TABLET ORAL at 17:53

## 2023-05-17 RX ADMIN — BUTALBITAL, ACETAMINOPHEN AND CAFFEINE 1 TABLET: 325; 50; 40 TABLET ORAL at 07:24

## 2023-05-17 RX ADMIN — LOPERAMIDE HYDROCHLORIDE 2 MG: 2 CAPSULE ORAL at 12:34

## 2023-05-17 RX ADMIN — BUTALBITAL, ACETAMINOPHEN AND CAFFEINE 1 TABLET: 325; 50; 40 TABLET ORAL at 14:26

## 2023-05-17 RX ADMIN — PROPOFOL 30 MG: 10 INJECTION, EMULSION INTRAVENOUS at 16:13

## 2023-05-17 RX ADMIN — PROPOFOL 20 MG: 10 INJECTION, EMULSION INTRAVENOUS at 16:11

## 2023-05-17 RX ADMIN — PROPOFOL 30 MG: 10 INJECTION, EMULSION INTRAVENOUS at 16:30

## 2023-05-17 RX ADMIN — PROPOFOL 30 MG: 10 INJECTION, EMULSION INTRAVENOUS at 16:19

## 2023-05-17 RX ADMIN — SODIUM CHLORIDE, POTASSIUM CHLORIDE, SODIUM LACTATE AND CALCIUM CHLORIDE: 600; 310; 30; 20 INJECTION, SOLUTION INTRAVENOUS at 16:08

## 2023-05-17 RX ADMIN — BUTALBITAL, ACETAMINOPHEN AND CAFFEINE 1 TABLET: 325; 50; 40 TABLET ORAL at 00:50

## 2023-05-17 ASSESSMENT — PAIN DESCRIPTION - PAIN TYPE
TYPE: ACUTE PAIN

## 2023-05-17 NOTE — PROGRESS NOTES
Received report from day shift RN. Assumed patient care. Assessment completed at beginning of shift.     Patient is A+O x4.  Patient is on 0L.   Tolerating clear liquid diet. NPO at midnight. Denies N/V.  Denies chest pain or SOB.  Pain 3 on a 0-10 pain scale. Headache only.  + void, + BM. Last BM 5/16.  Skin per flowsheets.   Patient ambulates independently.   Plan: Finish Bowel prep for surgery.     Plan of care discussed, all questions answered. Educated on the importance of calling before getting OOB and pt verbalizes understanding. Educated regarding importance of oral care. Oral care kit at bedside. Call light is within reach, treaded slipper socks on, bed in lowest/ locked position, hourly rounding in place, all needs met at this time

## 2023-05-17 NOTE — PROGRESS NOTES
Hospital Medicine Daily Progress Note    Date of Service  5/17/2023    Chief Complaint  Kendra Winters is a 67 y.o. female admitted 5/13/2023 with abdominal pain    Hospital Course  This is a 87-year-old female with a past medical history significant for hypertension, hyperlipidemia, depression, multiple episodes of C. difficile colitis for the last 8 years; on chronic oral vancomycin.  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    Upon presentation, she noted to be febrile, tachypnea, tachycardia, WBC 15.2, lactic acid 2.4, CT scan of abdomen pelvis with colitis, inflammatory versus ischemic' also swirled appearance of the mesenteric vessels showing concern for partial or evolving cecal volvulus.    Blood culture x2 ordered ; General surgery  has evaluated initially for volvulus..  Per general surgery, no surgical intervention; GI was consulted, who recommended transferring the patient to [facility].    GI planning for fecal transplant        Interval Problem Update  5/16/2023  Vitals remained stable, remained afebrile  Currently denies nausea/vomiting, abdominal pain  Scheduled for to have fecal transplant in a.m.  GI on board    5/17/2023  Vitals stable   Labs reviewed   Plan for fecal transplant today   Need oral vancomycin on discharge  Anticipated discharge in AM  Discussed with GI    I have discussed this patient's plan of care and discharge plan at IDT rounds today with Case Management, Nursing, Nursing leadership, and other members of the IDT team.    Consultants/Specialty  general surgery    Code Status  Full Code    Disposition  The patient is not medically cleared for discharge to home or a post-acute facility.  Anticipate discharge to: home with close outpatient follow-up    I have placed the appropriate orders for post-discharge needs.    Review of Systems  Review of  Systems   Constitutional:  Positive for malaise/fatigue.        Physical Exam  Temp:  [36.5 °C (97.7 °F)-36.7 °C (98.1 °F)] 36.6 °C (97.9 °F)  Pulse:  [61-72] 61  Resp:  [16-18] 16  BP: (108-149)/(66-83) 149/72  SpO2:  [94 %-99 %] 95 %    Physical Exam  HENT:      Head: Normocephalic.   Cardiovascular:      Rate and Rhythm: Normal rate.   Abdominal:      General: Abdomen is flat.      Tenderness: There is no abdominal tenderness.   Musculoskeletal:      Right lower leg: No edema.      Left lower leg: Edema present.   Neurological:      General: No focal deficit present.      Mental Status: She is oriented to person, place, and time. Mental status is at baseline.         Fluids    Intake/Output Summary (Last 24 hours) at 5/17/2023 1235  Last data filed at 5/16/2023 1430  Gross per 24 hour   Intake 120 ml   Output --   Net 120 ml       Laboratory  Recent Labs     05/15/23  0346 05/17/23  1150   WBC 6.5 5.3   RBC 3.85* 4.16*   HEMOGLOBIN 11.5* 12.0   HEMATOCRIT 33.6* 36.3*   MCV 87.3 87.3   MCH 29.9 28.8   MCHC 34.2 33.1*   RDW 41.1 40.9   PLATELETCT 213 264   MPV 9.2 9.4       Recent Labs     05/15/23  0346 05/17/23  1150   SODIUM 138 140   POTASSIUM 3.6 3.9   CHLORIDE 109 108   CO2 19* 21   GLUCOSE 94 82   BUN 6* 5*   CREATININE 0.44* 0.48*   CALCIUM 8.1* 8.7                     Imaging  No orders to display          Assessment/Plan  Cecal volvulus (HCC)- (present on admission)  Assessment & Plan  - CT showing swirled appearance of the mesenteric vessels and there was a concern of partial or evolving cecal volvulus.  -General Surgery consulted, Dr. Zuniga, presentation not consistent with cecal volvulus, no further surgical needs.    Hypokalemia- (present on admission)  Assessment & Plan  K 40 MeqX 1    Hypercholesteremia- (present on admission)  Assessment & Plan  statin    Essential hypertension- (present on admission)  Assessment & Plan  Blood pressure well controlled, continue lisinopril 10 mg p.o.  daily    Clostridium difficile enterocolitis- (present on admission)  Assessment & Plan  CT of the abdomen show findings consistent with colitis.  -She has history of chronic C. difficile colitis for the last 8 years.  Status post fecal transplantation's x3 and on chronic p.o. vancomycin for the last year.  Plan for fecal transplant         VTE prophylaxis: SCDs/TEDs    I have performed a physical exam and reviewed and updated ROS and Plan today (5/17/2023). In review of yesterday's note (5/16/2023), there are no changes except as documented above.

## 2023-05-17 NOTE — PROGRESS NOTES
Report received from previous shift RN.  Patient resting in bed comfortably.  Patient awaiting procedure today.  All needs met at this time. Call light within reach. Hourly rounding in place.

## 2023-05-17 NOTE — CARE PLAN
The patient is Stable - Low risk of patient condition declining or worsening    Shift Goals  Clinical Goals: Pain control, rest, NPO at midnight  Patient Goals: Pain control, rest, surgery tomorrow  Family Goals: No family currently at the bedside    Progress made toward(s) clinical / shift goals:  Patient verbalized understanding of plan of care. Patient's pain is managed with current pain medications per MAR. Patient ambulates independently.     Patient is not progressing towards the following goals:

## 2023-05-17 NOTE — ANESTHESIA PREPROCEDURE EVALUATION
Case: [id number] Date/Time: 05/17/23 1600    Procedures:       COLONOSCOPY (Left: Anus)      FECAL MICROBIOTA TRANSFER (Left: Anus)    Anesthesia type: MAC    Location: [place] [address] / SURGERY [facility]    Surgeons: Jordyn Richards M.D.          Relevant Problems   CARDIAC   (positive) Essential hypertension      GI   (positive) Gastroesophageal reflux disease with esophagitis         (positive) TELMA (acute kidney injury) (HCC)      Other   (positive) Clostridium difficile enterocolitis   (positive) Hypercholesteremia       Physical Exam    Airway   Mallampati: II  TM distance: >3 FB  Neck ROM: full       Cardiovascular - normal exam  Rhythm: regular  Rate: normal  (-) murmur     Dental - normal exam           Pulmonary - normal exam  Breath sounds clear to auscultation     Abdominal    Neurological - normal exam                 Anesthesia Plan    ASA 3       Plan - general       Airway plan will be natural airway          Induction: intravenous    Postoperative Plan: Postoperative administration of opioids is intended.    Pertinent diagnostic labs and testing reviewed    Informed Consent:    Anesthetic plan and risks discussed with patient.    Use of blood products discussed with: patient whom consented to blood products.

## 2023-05-17 NOTE — DISCHARGE PLANNING
Care Transition Team Assessment    RN CHRIS Met pt at bedside, she confirmed demographic on facesheet. Pt lives with her spouse and dtr in a 2 story home with 2 steps into the home and 13 steps to the second story. She has not difficulty with ADLs, she is still working part time. She is independent with managing all medications, transportation, and appointments herself.     Pt reports this is her 3rd fecal transplant and verbalized understanding of what to expect. She has help at home from her family if needed.    Pt reports no needs for DME or HH prior to admission and does not expect to need them post-discharge.    Information Source  Orientation Level: Oriented X4  Information Given By: Patient  Who is responsible for making decisions for patient? : Patient    Readmission Evaluation  Is this a readmission?: No    Elopement Risk  Legal Hold: No  Ambulatory or Self Mobile in Wheelchair: Yes  Disoriented: No  Psychiatric Symptoms: None  History of Wandering: No  Elopement this Admit: No  Vocalizing Wanting to Leave: No  Displays Behaviors, Body Language Wanting to Leave: No-Not at Risk for Elopement  Elopement Risk: Not at Risk for Elopement    Interdisciplinary Discharge Planning  Lives with - Patient's Self Care Capacity: Significant Other, Adult Children  Patient or legal guardian wants to designate a caregiver: No  Support Systems: Friends / Neighbors  Housing / Facility: 2 Story Apartment / Condo  Durable Medical Equipment: Not Applicable    Discharge Preparedness  What is your plan after discharge?: Home with help  What are your discharge supports?: Child, Spouse  Prior Functional Level: Independent with Activities of Daily Living, Independent with Medication Management  Difficulity with ADLs: None  Difficulity with IADLs: None    Functional Assesment  Prior Functional Level: Independent with Activities of Daily Living, Independent with Medication Management    Finances  Financial Barriers to Discharge:  No  Prescription Coverage: Yes    Vision / Hearing Impairment  Vision Impairment : Yes  Right Eye Vision: Impaired, Wears Glasses  Left Eye Vision: Impaired, Wears Glasses    Advance Directive  Advance Directive?: None  Advance Directive offered?: AD Booklet refused (Pt reports she has one at home and will provide a copy for her EMR)    Domestic Abuse  Have you ever been the victim of abuse or violence?: No  Physical Abuse or Sexual Abuse: No  Verbal Abuse or Emotional Abuse: No  Possible Abuse/Neglect Reported to:: Not Applicable    Psychological Assessment  History of Substance Abuse: None  History of Psychiatric Problems: No  Non-compliant with Treatment: No  Newly Diagnosed Illness: No    Discharge Risks or Barriers  Discharge risks or barriers?: No    Anticipated Discharge Information  Discharge Disposition: Discharged to home/self care (01)

## 2023-05-17 NOTE — PROCEDURES
OPERATIVE REPORT        PATIENT: Kendra Winters  1955      PREOPERATIVE DIAGNOSIS/INDICATION: Recurrent C.diff    POSTOPERATIVE DIAGNOSES: Colonoscopy, fecal microbiota transfer.     PROCEDURE: COLONOSCOPY    PHYSICIAN: Dong Richards MD MPH    CONSENT:  OBTAINED. The risks, benefits and alternatives of the procedure were discussed in details. The risks include and are not limited to bleeding, infection, perforation, missed lesions, and sedations risks (cardiopulmonary compromise and allergic reaction to medications).    ANESTHESIA:  Per anesthesiologist.    LOCATION: [facility]    DESCRIPTION:  The patient presented to the procedure room.  After routine checkup was performed, patient was brought into endoscopy suite.  Patient was placed on his left lateral decubitus position.  Patient was sedated by anesthesia. Vital signs were monitored throughout procedure.  Oxygenation support was provided throughout procedure. Digital rectal examination was performed.  Then, a colonoscope was inserted into patient's anus, advanced to the TERMINAL ILEUM under direct visualization.  Once the site was reached and examined, the colonoscope was withdrawn and procedure was terminated. Withdrawal time was at least 6 minutes to ensure adequate examination.    The patient tolerated the procedure well.  There were no immediate complications.    The quality of the bowel preparation was adequate.    Mena  Right:2  Mid:2  Left:2      Cecal intubation yes. Two times of cecal intubation done.    Retroflexion done.       RECTUM: External hemorrhoid.     SIGMOID COLON: within normal limits.    DESCENDING COLON: within normal limits.    TRANSVERSE COLON: within normal limits.    ASCENDING COLON: within normal limits.    CECUM: within normal limits.    TERMINAL ILEUM: grossly normal.     RECOMMENDATIONS:  Grossly normal colonoscopy with insignificant erythematous change.   Spray of microbiota transfer was done   1/4 at terminal  ileum  1/4 at A colon   1/4 at T colon   And 1/4 at middle descending.     No other intervention from GI team for now.     When the patient fully recovers from sedation, if no immediate complication from our procedure, the patient could be discharged from GI standpoint.     Agree to stop all anti-c.diff antibiotics for now after today procedure.     This note has been transcribed with digital voice recognition software and although it has been reviewed may contain grammatical or word errors

## 2023-05-17 NOTE — CARE PLAN
The patient is Stable - Low risk of patient condition declining or worsening    Shift Goals  Clinical Goals: pain control, prep for procedure  Patient Goals: Pain control, rest, surgery tomorrow  Family Goals: No family currently at the bedside    Progress made toward(s) clinical / shift goals:  Pain managed per MAR. Patient educated on importance of oob activity.      Problem: Knowledge Deficit - Standard  Goal: Patient and family/care givers will demonstrate understanding of plan of care, disease process/condition, diagnostic tests and medications  Outcome: Progressing     Problem: Pain - Standard  Goal: Alleviation of pain or a reduction in pain to the patient’s comfort goal  Outcome: Progressing

## 2023-05-18 VITALS
OXYGEN SATURATION: 95 % | DIASTOLIC BLOOD PRESSURE: 73 MMHG | BODY MASS INDEX: 23.9 KG/M2 | RESPIRATION RATE: 16 BRPM | SYSTOLIC BLOOD PRESSURE: 141 MMHG | TEMPERATURE: 97.9 F | HEIGHT: 64 IN | WEIGHT: 139.99 LBS | HEART RATE: 60 BPM

## 2023-05-18 LAB — EKG IMPRESSION: NORMAL

## 2023-05-18 PROCEDURE — 99239 HOSP IP/OBS DSCHRG MGMT >30: CPT | Performed by: STUDENT IN AN ORGANIZED HEALTH CARE EDUCATION/TRAINING PROGRAM

## 2023-05-18 PROCEDURE — A9270 NON-COVERED ITEM OR SERVICE: HCPCS

## 2023-05-18 PROCEDURE — 700102 HCHG RX REV CODE 250 W/ 637 OVERRIDE(OP): Performed by: STUDENT IN AN ORGANIZED HEALTH CARE EDUCATION/TRAINING PROGRAM

## 2023-05-18 PROCEDURE — A9270 NON-COVERED ITEM OR SERVICE: HCPCS | Performed by: STUDENT IN AN ORGANIZED HEALTH CARE EDUCATION/TRAINING PROGRAM

## 2023-05-18 PROCEDURE — 93010 ELECTROCARDIOGRAM REPORT: CPT | Performed by: INTERNAL MEDICINE

## 2023-05-18 PROCEDURE — 700102 HCHG RX REV CODE 250 W/ 637 OVERRIDE(OP)

## 2023-05-18 RX ADMIN — BUTALBITAL, ACETAMINOPHEN AND CAFFEINE 1 TABLET: 325; 50; 40 TABLET ORAL at 12:23

## 2023-05-18 RX ADMIN — BUTALBITAL, ACETAMINOPHEN AND CAFFEINE 1 TABLET: 325; 50; 40 TABLET ORAL at 00:58

## 2023-05-18 RX ADMIN — AMLODIPINE BESYLATE 5 MG: 10 TABLET ORAL at 05:36

## 2023-05-18 ASSESSMENT — PAIN DESCRIPTION - PAIN TYPE: TYPE: ACUTE PAIN

## 2023-05-18 NOTE — PROGRESS NOTES
Patient to floor with transport in stable condition. VSS. Aox4 and on RA. Family updated. No further needs.

## 2023-05-18 NOTE — DISCHARGE SUMMARY
Discharge Summary    CHIEF COMPLAINT ON ADMISSION  No chief complaint on file.      Reason for Admission  C Diff     Admission Date  5/13/2023    CODE STATUS  Full Code    HPI & HOSPITAL COURSE    This is a 87-year-old female with a past medical history significant for hypertension, hyperlipidemia, depression, multiple episodes of C. difficile colitis for the last 8 years; on chronic oral vancomycin.  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     Upon presentation, she noted to be febrile, tachypnea, tachycardia, WBC 15.2, lactic acid 2.4, CT scan of abdomen pelvis with colitis, inflammatory versus ischemic' also swirled appearance of the mesenteric vessels showing concern for partial or evolving cecal volvulus.     Blood culture x2 ordered ; General surgery  has evaluated initially for volvulus..  Per general surgery, no surgical intervention; GI was consulted, who recommended transferring the patient to [facility].     S/p colonoscopy, fecal transplant was done.  GI cleared patient for discharge.  Discussed with ID pharmacist Nelli no indication of oral vancomycin at this time.    At the time of discharge patient remain  hemodynamically stable ,asymptomatic ,labs remain unremarkable .  Patient will be discharged with close follow up with PCP, GI..Discharge plan was discussed with patient in details .  Patient agreed with discharge plan  and  all questions answered.       Therefore, she is discharged in good and stable condition to home with close outpatient follow-up.    The patient met 2-midnight criteria for an inpatient stay at the time of discharge.    Discharge Date  5/18/2023      DISCHARGE DIAGNOSES  Active Problems:    Clostridium difficile enterocolitis (POA: Yes)    Essential hypertension (POA: Yes)    Hypercholesteremia (POA: Yes)    Hypokalemia (POA: Yes)    Cecal volvulus (HCC)  (POA: Yes)  Resolved Problems:    * No resolved hospital problems. *      FOLLOW UP  No future appointments.  No follow-up provider specified.    MEDICATIONS ON DISCHARGE     Medication List        CONTINUE taking these medications        Instructions   atorvastatin 20 MG Tabs  Commonly known as: LIPITOR   Take 20 mg by mouth every evening.  Dose: 20 mg     lisinopril 10 MG Tabs  Commonly known as: PRINIVIL   Take 10 mg by mouth every evening.  Dose: 10 mg     ondansetron 4 MG Tbdp  Commonly known as: ZOFRAN ODT   Take 1 Tablet by mouth every 8 hours as needed for Nausea.  Dose: 4 mg     sertraline 50 MG Tabs  Commonly known as: Zoloft   Take 50 mg by mouth every evening.  Dose: 50 mg     zolpidem 5 MG Tabs  Commonly known as: AMBIEN   Take 5 mg by mouth at bedtime.  Dose: 5 mg            STOP taking these medications      vancomycin 125 MG capsule  Commonly known as: VANCOCIN              Allergies  Allergies   Allergen Reactions    Latex Anaphylaxis, Shortness of Breath and Swelling       DIET  Orders Placed This Encounter   Procedures    Diet Order Diet: Low Fiber(GI Soft)     Standing Status:   Standing     Number of Occurrences:   1     Order Specific Question:   Diet:     Answer:   Low Fiber(GI Soft) [2]       ACTIVITY  As tolerated.  Weight bearing as tolerated    CONSULTATIONS  cardiology    PROCEDURES  No orders to display         LABORATORY  Lab Results   Component Value Date    SODIUM 140 05/17/2023    POTASSIUM 3.9 05/17/2023    CHLORIDE 108 05/17/2023    CO2 21 05/17/2023    GLUCOSE 82 05/17/2023    BUN 5 (L) 05/17/2023    CREATININE 0.48 (L) 05/17/2023        Lab Results   Component Value Date    WBC 5.3 05/17/2023    HEMOGLOBIN 12.0 05/17/2023    HEMATOCRIT 36.3 (L) 05/17/2023    PLATELETCT 264 05/17/2023        Total time of the discharge process exceeds 37 minutes.

## 2023-05-18 NOTE — PROGRESS NOTES
Report received from previous shift RN.  Assessment complete.  Patient resting in bed comfortably.  All needs met at this time. Call light within reach. Hourly rounding in place.

## 2023-05-18 NOTE — CARE PLAN
The patient is Watcher - Medium risk of patient condition declining or worsening    Shift Goals  Clinical Goals: pain mgmt  Patient Goals: rest  Family Goals: No family currently at the bedside    Progress made toward(s) clinical / shift goals:    Problem: Knowledge Deficit - Standard  Goal: Patient and family/care givers will demonstrate understanding of plan of care, disease process/condition, diagnostic tests and medications  Description: Target End Date:  1-3 days or as soon as patient condition allows    Document in Patient Education    1.  Patient and family/caregiver oriented to unit, equipment, visitation policy and means for communicating concern  2.  Complete/review Learning Assessment  3.  Assess knowledge level of disease process/condition, treatment plan, diagnostic tests and medications  4.  Explain disease process/condition, treatment plan, diagnostic tests and medications  Outcome: Progressing

## 2023-05-18 NOTE — PROGRESS NOTES
Report received from day shift RN, assumed Care.   Patient is AOx4, responds appropriately.      Pain controlled at this time.  Patient is tolerating low fiber gi soft diet, denies nausea/vomiting. + flatus  Up self with steady gait.    Plan of care discussed, all questions answered.    Educated on use of call light and importance of calling when assistance is required. Pt verbalizes understanding.    Call light and belongings within reach, treaded slipper socks on, bed in lowest locked position.  All needs met at this time.

## 2023-05-18 NOTE — ANESTHESIA TIME REPORT
Anesthesia Start and Stop Event Times     Date Time Event    5/17/2023 1605 Ready for Procedure     1608 Anesthesia Start     1642 Anesthesia Stop        Responsible Staff  05/17/23    Name Role Begin End    Andrés Dietz M.D. Anesth 1608 1642        Overtime Reason:  no overtime (within assigned shift)    Comments:

## 2023-05-18 NOTE — PROGRESS NOTES
Discharge instructions reviewed and signed by patient. No further questions at this time. IV DC'd by RN. All belongings gathered and given to patient. Patient left via car with spouse.

## 2023-05-18 NOTE — PROGRESS NOTES
"Patient back to room T413 with transport.   BP (!) 169/82   Pulse (!) 54   Temp 36.2 °C (97.2 °F) (Temporal)   Resp 16   Ht 1.626 m (5' 4\")   Wt 63.5 kg (139 lb 15.9 oz)   SpO2 99%   BMI 24.03 kg/m²    "

## 2023-05-18 NOTE — ANESTHESIA POSTPROCEDURE EVALUATION
Patient: Kendra Gregorio Vianey    Procedure Summary     Date: 05/17/23 Room / Location: [facility] OR 06 / SURGERY [facility]    Anesthesia Start: 1608 Anesthesia Stop: 1642    Procedures:       COLONOSCOPY (Left: Anus)      FECAL MICROBIOTA TRANSFER (Left: Anus) Diagnosis: (fecal implant)    Surgeons: Jordyn Richards M.D. Responsible Provider: Andrés Dietz M.D.    Anesthesia Type: general ASA Status: 3          Final Anesthesia Type: general  Last vitals  BP   Blood Pressure : (!) 183/73    Temp   35.8 °C (96.5 °F)    Pulse   65   Resp   14    SpO2   98 %      Anesthesia Post Evaluation    Patient location during evaluation: PACU  Patient participation: complete - patient participated  Level of consciousness: awake and alert    Airway patency: patent  Anesthetic complications: no  Cardiovascular status: hemodynamically stable  Respiratory status: acceptable  Hydration status: euvolemic    PONV: none          No notable events documented.     Nurse Pain Score: 0 (NPRS)

## 2023-05-24 NOTE — DOCUMENTATION QUERY
"                                                                         [facility]                                                                       Query Response Note      PATIENT:               TEODORO RESTREPO  ACCT #:                  [id number]  MRN:                     [id number]  :                      1955  ADMIT DATE:       2023 1:06 AM  DISCH DATE:        2023 3:34 PM  RESPONDING  PROVIDER #:        [id number]           QUERY TEXT:    Sepsis is documented in the H&P however, is not documented in subsequent notes. Admission SIRS were 4 of 4 with temperature 101.2 F, pulse 118, respirations 22 and WBC 15.2. Per DC Summary \"symptoms were consistent with recurrent C. difficile colitis\".    Please clarify the status of sepsis and if ruled in, provide sepsis-related organ dysfunction.    Sepsis - real or suspected infection plus 2 or more SIRS criteria + organ dysfunction related to sepsis    Temp <96.8 or >101  HR >90  RR >20  WBC <4,000 or > 12,000  >10% bandemia    The patient's Clinical Indicators include:    H&P: \"67-year-old female, coming with sepsis\". SIRS likely due to underlying colitis  BP: 98/56 - 155/61; T: 101.2 -> 94.8; F HR: 90 - 123; RR: 16 - 23; WBC: 15.2  Blood Cultures x 2: NEG; C. Diff: POS  CT-Abd: Lt. colon favoring colitis. Rotated appearance bowel RUQ, concern for cecal volvulus.       DC Summary: Symptoms consistent w/ recurrent C. difficile colitis. Cecal volvulus. SIRS.   WBC: 7.9; afebrile; HR: 74 - 94; RR: 15 - 18      Treatment: IVF bolus; IV Zosyn; Surgery Consult; lab/imaging  Risk Factors: Cecal volvulus; C. Diff colitis    Thank You,  Ibis Spring RN  Clinical    Connect via [place]  Options provided:   -- Sepsis exists, (provide sepsis-related organ dysfunction)   -- Sepsis is ruled out   -- Other explanation, Please specify      Query created by: Ibis Spring on 2023 11:25 AM    RESPONSE " TEXT:    Sepsis is ruled out          Electronically signed by:  TRISTEN PEARCE MD 5/23/2023 5:26 PM

## 2023-07-25 ENCOUNTER — HOSPITAL ENCOUNTER (EMERGENCY)
Facility: MEDICAL CENTER | Age: 68
End: 2023-07-25
Attending: EMERGENCY MEDICINE
Payer: COMMERCIAL

## 2023-07-25 ENCOUNTER — APPOINTMENT (OUTPATIENT)
Dept: RADIOLOGY | Facility: MEDICAL CENTER | Age: 68
End: 2023-07-25
Attending: EMERGENCY MEDICINE
Payer: COMMERCIAL

## 2023-07-25 VITALS
HEART RATE: 67 BPM | OXYGEN SATURATION: 90 % | DIASTOLIC BLOOD PRESSURE: 81 MMHG | BODY MASS INDEX: 22.88 KG/M2 | RESPIRATION RATE: 16 BRPM | SYSTOLIC BLOOD PRESSURE: 133 MMHG | WEIGHT: 134.04 LBS | HEIGHT: 64 IN | TEMPERATURE: 97.9 F

## 2023-07-25 DIAGNOSIS — B34.9 VIRAL SYNDROME: ICD-10-CM

## 2023-07-25 LAB
ALBUMIN SERPL BCP-MCNC: 4 G/DL (ref 3.2–4.9)
ALBUMIN/GLOB SERPL: 1.4 G/DL
ALP SERPL-CCNC: 90 U/L (ref 30–99)
ALT SERPL-CCNC: 18 U/L (ref 2–50)
ANION GAP SERPL CALC-SCNC: 14 MMOL/L (ref 7–16)
AST SERPL-CCNC: 28 U/L (ref 12–45)
BASOPHILS # BLD AUTO: 0.4 % (ref 0–1.8)
BASOPHILS # BLD: 0.02 K/UL (ref 0–0.12)
BILIRUB SERPL-MCNC: 0.5 MG/DL (ref 0.1–1.5)
BUN SERPL-MCNC: 11 MG/DL (ref 8–22)
CALCIUM ALBUM COR SERPL-MCNC: 9.2 MG/DL (ref 8.5–10.5)
CALCIUM SERPL-MCNC: 9.2 MG/DL (ref 8.4–10.2)
CHLORIDE SERPL-SCNC: 105 MMOL/L (ref 96–112)
CO2 SERPL-SCNC: 18 MMOL/L (ref 20–33)
CREAT SERPL-MCNC: 0.53 MG/DL (ref 0.5–1.4)
EOSINOPHIL # BLD AUTO: 0.08 K/UL (ref 0–0.51)
EOSINOPHIL NFR BLD: 1.6 % (ref 0–6.9)
ERYTHROCYTE [DISTWIDTH] IN BLOOD BY AUTOMATED COUNT: 45 FL (ref 35.9–50)
FLUAV RNA SPEC QL NAA+PROBE: NEGATIVE
FLUBV RNA SPEC QL NAA+PROBE: NEGATIVE
GFR SERPLBLD CREATININE-BSD FMLA CKD-EPI: 101 ML/MIN/1.73 M 2
GLOBULIN SER CALC-MCNC: 2.8 G/DL (ref 1.9–3.5)
GLUCOSE SERPL-MCNC: 93 MG/DL (ref 65–99)
HCT VFR BLD AUTO: 41.4 % (ref 37–47)
HGB BLD-MCNC: 13.8 G/DL (ref 12–16)
IMM GRANULOCYTES # BLD AUTO: 0 K/UL (ref 0–0.11)
IMM GRANULOCYTES NFR BLD AUTO: 0 % (ref 0–0.9)
LACTATE SERPL-SCNC: 1.1 MMOL/L (ref 0.5–2)
LIPASE SERPL-CCNC: 24 U/L (ref 11–82)
LYMPHOCYTES # BLD AUTO: 2.37 K/UL (ref 1–4.8)
LYMPHOCYTES NFR BLD: 45.9 % (ref 22–41)
MCH RBC QN AUTO: 29.6 PG (ref 27–33)
MCHC RBC AUTO-ENTMCNC: 33.3 G/DL (ref 32.2–35.5)
MCV RBC AUTO: 88.8 FL (ref 81.4–97.8)
MONOCYTES # BLD AUTO: 0.42 K/UL (ref 0–0.85)
MONOCYTES NFR BLD AUTO: 8.1 % (ref 0–13.4)
NEUTROPHILS # BLD AUTO: 2.27 K/UL (ref 1.82–7.42)
NEUTROPHILS NFR BLD: 44 % (ref 44–72)
NRBC # BLD AUTO: 0 K/UL
NRBC BLD-RTO: 0 /100 WBC (ref 0–0.2)
PLATELET # BLD AUTO: 223 K/UL (ref 164–446)
PMV BLD AUTO: 8.8 FL (ref 9–12.9)
POTASSIUM SERPL-SCNC: 3.5 MMOL/L (ref 3.6–5.5)
PROT SERPL-MCNC: 6.8 G/DL (ref 6–8.2)
RBC # BLD AUTO: 4.66 M/UL (ref 4.2–5.4)
RSV RNA SPEC QL NAA+PROBE: NEGATIVE
S PYO DNA SPEC NAA+PROBE: NOT DETECTED
SARS-COV-2 RNA RESP QL NAA+PROBE: NOTDETECTED
SODIUM SERPL-SCNC: 137 MMOL/L (ref 135–145)
SPECIMEN SOURCE: NORMAL
WBC # BLD AUTO: 5.2 K/UL (ref 4.8–10.8)

## 2023-07-25 PROCEDURE — 74176 CT ABD & PELVIS W/O CONTRAST: CPT

## 2023-07-25 PROCEDURE — 700105 HCHG RX REV CODE 258: Performed by: EMERGENCY MEDICINE

## 2023-07-25 PROCEDURE — 96375 TX/PRO/DX INJ NEW DRUG ADDON: CPT

## 2023-07-25 PROCEDURE — 85025 COMPLETE CBC W/AUTO DIFF WBC: CPT

## 2023-07-25 PROCEDURE — 80053 COMPREHEN METABOLIC PANEL: CPT

## 2023-07-25 PROCEDURE — 83605 ASSAY OF LACTIC ACID: CPT

## 2023-07-25 PROCEDURE — 700111 HCHG RX REV CODE 636 W/ 250 OVERRIDE (IP): Mod: JZ | Performed by: EMERGENCY MEDICINE

## 2023-07-25 PROCEDURE — 99284 EMERGENCY DEPT VISIT MOD MDM: CPT

## 2023-07-25 PROCEDURE — C9803 HOPD COVID-19 SPEC COLLECT: HCPCS | Performed by: EMERGENCY MEDICINE

## 2023-07-25 PROCEDURE — 96374 THER/PROPH/DIAG INJ IV PUSH: CPT

## 2023-07-25 PROCEDURE — 87651 STREP A DNA AMP PROBE: CPT

## 2023-07-25 PROCEDURE — 0241U HCHG SARS-COV-2 COVID-19 NFCT DS RESP RNA 4 TRGT MIC: CPT

## 2023-07-25 PROCEDURE — 83690 ASSAY OF LIPASE: CPT

## 2023-07-25 PROCEDURE — 36415 COLL VENOUS BLD VENIPUNCTURE: CPT

## 2023-07-25 RX ORDER — ONDANSETRON 2 MG/ML
4 INJECTION INTRAMUSCULAR; INTRAVENOUS ONCE
Status: COMPLETED | OUTPATIENT
Start: 2023-07-25 | End: 2023-07-25

## 2023-07-25 RX ORDER — SODIUM CHLORIDE 9 MG/ML
1000 INJECTION, SOLUTION INTRAVENOUS ONCE
Status: COMPLETED | OUTPATIENT
Start: 2023-07-25 | End: 2023-07-25

## 2023-07-25 RX ORDER — ASCORBIC ACID 125 MG
2 TABLET,CHEWABLE ORAL EVERY EVENING
COMMUNITY

## 2023-07-25 RX ORDER — FLUTICASONE PROPIONATE 50 MCG
1 SPRAY, SUSPENSION (ML) NASAL PRN
COMMUNITY

## 2023-07-25 RX ORDER — MORPHINE SULFATE 4 MG/ML
4 INJECTION INTRAVENOUS ONCE
Status: COMPLETED | OUTPATIENT
Start: 2023-07-25 | End: 2023-07-25

## 2023-07-25 RX ORDER — FLUTICASONE PROPIONATE 50 MCG
1 SPRAY, SUSPENSION (ML) NASAL DAILY
Qty: 16 G | Refills: 0 | Status: SHIPPED | OUTPATIENT
Start: 2023-07-25

## 2023-07-25 RX ADMIN — ONDANSETRON 4 MG: 2 INJECTION INTRAMUSCULAR; INTRAVENOUS at 14:23

## 2023-07-25 RX ADMIN — MORPHINE SULFATE 4 MG: 4 INJECTION INTRAVENOUS at 14:26

## 2023-07-25 RX ADMIN — SODIUM CHLORIDE 1000 ML: 9 INJECTION, SOLUTION INTRAVENOUS at 14:15

## 2023-07-25 ASSESSMENT — FIBROSIS 4 INDEX: FIB4 SCORE: 1.07

## 2023-07-25 NOTE — DISCHARGE INSTRUCTIONS
You were seen in the ER for flu and cold symptoms as well as diarrhea.  Thankfully, your labs and imaging are reassuring and you do not require hospitalization.  I have given you a prescription for Flonase, please use it as directed.  You can also use a Leticia pot for your nasal congestion.  Please make sure to drink at least 8 ounces of clear, nonalcoholic liquids every hour to maintain your hydration.  Please follow-up with your primary care physician this week for recheck.  Return immediately to the ER with any new or worsening symptoms.  I hope you feel better soon exclamation.

## 2023-07-25 NOTE — ED TRIAGE NOTES
Pt presents from home for daughter for flu like illness 8-9 days. +Malaise, fever, TILLMAN, sore throat. Was seen at [facility] and had neg covid test.     Pt also reports right sided abd pain that stated yesterday and having intermittent explosive watery stool that started 4 days ago. Hx of fecal tx last month for C. Diff.

## 2023-07-25 NOTE — ED PROVIDER NOTES
ED Provider Note    CHIEF COMPLAINT  Chief Complaint   Patient presents with    Flu Like Symptoms    Diarrhea       EXTERNAL RECORDS REVIEWED  Inpatient Notes -patient was admitted to this facility 5/13/2023 to 5/18/2023 for C. difficile.  She has a history of the same for the past 8 years and is on chronic oral vancomycin.  She has also had fecal transplant x3.  She was admitted with fever, tachypnea, tachycardia, leukocytosis, lactic acidosis.  CT revealed colitis inflammatory versus ischemic as well as concern for partial or evolving cecal volvulus.  General surgery evaluated the patient and no surgical intervention was recommended.  GI was consulted and the patient was transferred to [facility].  She underwent a colonoscopy and fecal transplant was done.    HPI/ROS  LIMITATION TO HISTORY   Select: : None  OUTSIDE HISTORIAN(S):  None    Kendra Tracey Winters is a 67 y.o. 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  Patient reports she was recently hospitalized for sepsis due to C. difficile.  She underwent a fecal transplant and since that time has discontinued her oral vancomycin.  Approximately 8 days ago she developed nasal congestion and nausea which then progressed to vomiting, abdominal pain, sore throat, and explosive/watery diarrhea.  She has had a fever as high as 101 °F but did not take any Tylenol or Motrin today.  She has been taking Zofran without significant improvement in her symptoms.  She denies hematemesis, hematochezia, melena.    PAST MEDICAL HISTORY   has a past medical history of Adverse effect of anesthesia, Anesthesia, Arthritis, Bronchitis (2013), C. difficile colitis, C. difficile diarrhea (05/2013), Cataract, Diverticulitis, High cholesterol, Hypertension, Insomnia related to another mental disorder, Pain, Pneumonia (2011), PONV (postoperative nausea and vomiting),  "Psychiatric disturbance, and ULCERATIVE COLITIS.    SURGICAL HISTORY   has a past surgical history that includes other orthopedic surgery; tonsillectomy; orif, wrist (9/5/2013); fecal transplant (11/05/2013); knee arthroplasty total (9/26/2014); hemorrhoidectomy; total knee arthroplasty (Left, 5/31/2019); colonoscopy,diagnostic (Left, 5/17/2023); and fecal transplant (Left, 5/17/2023).    FAMILY HISTORY  Family History   Problem Relation Age of Onset    Cancer Mother 48        breast    Cancer Maternal Grandmother     Hypertension Father     Heart Disease Father        SOCIAL HISTORY  Social History     Tobacco Use    Smoking status: Former     Packs/day: 0.50     Years: 20.00     Pack years: 10.00     Types: Cigarettes     Quit date: 2/20/2013     Years since quitting: 10.4    Smokeless tobacco: Never    Tobacco comments:     2 cig /week   Vaping Use    Vaping Use: Never used   Substance and Sexual Activity    Alcohol use: Never     Comment: 2-3 per week    Drug use: No    Sexual activity: Not Currently       CURRENT MEDICATIONS  Home Medications       Reviewed by Shila Thompson R.N. (Registered Nurse) on 07/25/23 at 1245  Med List Status: Not Addressed     Medication Last Dose Status   atorvastatin (LIPITOR) 20 MG Tab  Active   lisinopril (PRINIVIL) 10 MG Tab  Active   ondansetron (ZOFRAN ODT) 4 MG TABLET DISPERSIBLE  Active   sertraline (ZOLOFT) 50 MG Tab  Active   zolpidem (AMBIEN) 5 MG Tab  Active                    ALLERGIES  Allergies   Allergen Reactions    Latex Anaphylaxis, Shortness of Breath and Swelling       PHYSICAL EXAM  VITAL SIGNS: /89   Pulse 75   Temp 36.8 °C (98.3 °F) (Temporal)   Resp 20   Ht 1.626 m (5' 4\")   Wt 60.8 kg (134 lb 0.6 oz)   SpO2 95%   BMI 23.01 kg/m²    Physical Exam  Vitals and nursing note reviewed.   Constitutional:       Appearance: Normal appearance.   HENT:      Head: Normocephalic and atraumatic.      Right Ear: External ear normal.      Left Ear: External " ear normal.      Nose: Nose normal.      Mouth/Throat:      Mouth: Mucous membranes are dry.      Comments: Posterior oropharynx is moist and pink without tonsillar erythema, edema, exudates.  Uvula is midline.  There is no peritonsillar fullness.  Eyes:      Extraocular Movements: Extraocular movements intact.      Conjunctiva/sclera: Conjunctivae normal.      Pupils: Pupils are equal, round, and reactive to light.   Cardiovascular:      Rate and Rhythm: Normal rate and regular rhythm.   Pulmonary:      Effort: Pulmonary effort is normal.      Breath sounds: Normal breath sounds.   Abdominal:      General: There is distension.      Palpations: Abdomen is soft.      Tenderness: There is no abdominal tenderness.   Musculoskeletal:         General: Normal range of motion.      Cervical back: Normal range of motion and neck supple.   Skin:     General: Skin is warm and dry.   Neurological:      General: No focal deficit present.      Mental Status: She is alert and oriented to person, place, and time.   Psychiatric:         Mood and Affect: Mood normal.         Behavior: Behavior normal.       DIAGNOSTIC STUDIES / PROCEDURES  LABS  Results for orders placed or performed during the hospital encounter of 07/25/23   CBC WITH DIFFERENTIAL   Result Value Ref Range    WBC 5.2 4.8 - 10.8 K/uL    RBC 4.66 4.20 - 5.40 M/uL    Hemoglobin 13.8 12.0 - 16.0 g/dL    Hematocrit 41.4 37.0 - 47.0 %    MCV 88.8 81.4 - 97.8 fL    MCH 29.6 27.0 - 33.0 pg    MCHC 33.3 32.2 - 35.5 g/dL    RDW 45.0 35.9 - 50.0 fL    Platelet Count 223 164 - 446 K/uL    MPV 8.8 (L) 9.0 - 12.9 fL    Neutrophils-Polys 44.00 44.00 - 72.00 %    Lymphocytes 45.90 (H) 22.00 - 41.00 %    Monocytes 8.10 0.00 - 13.40 %    Eosinophils 1.60 0.00 - 6.90 %    Basophils 0.40 0.00 - 1.80 %    Immature Granulocytes 0.00 0.00 - 0.90 %    Nucleated RBC 0.00 0.00 - 0.20 /100 WBC    Neutrophils (Absolute) 2.27 1.82 - 7.42 K/uL    Lymphs (Absolute) 2.37 1.00 - 4.80 K/uL    Monos  (Absolute) 0.42 0.00 - 0.85 K/uL    Eos (Absolute) 0.08 0.00 - 0.51 K/uL    Baso (Absolute) 0.02 0.00 - 0.12 K/uL    Immature Granulocytes (abs) 0.00 0.00 - 0.11 K/uL    NRBC (Absolute) 0.00 K/uL   Comp Metabolic Panel   Result Value Ref Range    Sodium 137 135 - 145 mmol/L    Potassium 3.5 (L) 3.6 - 5.5 mmol/L    Chloride 105 96 - 112 mmol/L    Co2 18 (L) 20 - 33 mmol/L    Anion Gap 14.0 7.0 - 16.0    Glucose 93 65 - 99 mg/dL    Bun 11 8 - 22 mg/dL    Creatinine 0.53 0.50 - 1.40 mg/dL    Calcium 9.2 8.4 - 10.2 mg/dL    Correct Calcium 9.2 8.5 - 10.5 mg/dL    AST(SGOT) 28 12 - 45 U/L    ALT(SGPT) 18 2 - 50 U/L    Alkaline Phosphatase 90 30 - 99 U/L    Total Bilirubin 0.5 0.1 - 1.5 mg/dL    Albumin 4.0 3.2 - 4.9 g/dL    Total Protein 6.8 6.0 - 8.2 g/dL    Globulin 2.8 1.9 - 3.5 g/dL    A-G Ratio 1.4 g/dL   LIPASE   Result Value Ref Range    Lipase 24 11 - 82 U/L   CoV-2, FLU A/B, and RSV by PCR (2-4 Hours CEPHEID) : Collect NP swab in VTM    Specimen: Respirate   Result Value Ref Range    Influenza virus A RNA Negative Negative    Influenza virus B, PCR Negative Negative    RSV, PCR Negative Negative    SARS-CoV-2 by PCR NotDetected     SARS-CoV-2 Source NP Swab    LACTIC ACID   Result Value Ref Range    Lactic Acid 1.1 0.5 - 2.0 mmol/L   Group A Strep by PCR    Specimen: Throat   Result Value Ref Range    Group A Strep by PCR Not Detected Not Detected   ESTIMATED GFR   Result Value Ref Range    GFR (CKD-EPI) 101 >60 mL/min/1.73 m 2     RADIOLOGY  I have independently interpreted the diagnostic imaging associated with this visit and am waiting the final reading from the radiologist.   My preliminary interpretation is as follows: No free air or obvious abscess.    Radiologist interpretation:   CT-ABDOMEN-PELVIS W/O   Final Result      1.  No evidence of bowel obstruction or focal inflammatory change.      2.  Sigmoid diverticulosis.      3.  Linear bibasilar atelectasis.      4.  Vascular calcification.        COURSE  & MEDICAL DECISION MAKING    ED Observation Status? Yes; I am placing the patient in to an observation status due to a diagnostic uncertainty as well as therapeutic intensity. Patient placed in observation status at 1:15 PM, 7/25/2023.     Observation plan is as follows: Labs, imaging, medication    Upon Reevaluation, the patient's condition has: Improved; and will be discharged.    Patient discharged from ED Observation status at 4:49 PM (Time) 7/25/2023 (Date).     INITIAL ASSESSMENT, COURSE AND PLAN  Care Narrative: This is a 67 year old female with a h/o Clostridium Difficile s/p recent fecal transplant who is here with nasal congestion, abdominal pain, nausea, vomiting, and diarrhea.    DDx includes, but is not limited to, pancreatitis, cholecystitis, GERD, GI bleed, bowel obstruction, diverticulitis, colitis - inflammatory vs infectious, mesenteric ischemia, appenditis, viral syndrome.    Arrives AF with normal VS. Appears dehydrated with dry mucous membranes. She does have significant nasal congestion. Posterior oropharynx is moist and pink without tonsillar erythema, edema, or exudates. Uvula is midline and there is no peritonsillar fullness. Low suspicion for PTA. Neck is supple and she has no muffled voice to suggest RPA. Abdomen is soft without any tenderness. No peritoneal signs.    Started on IV fluids and given a dose of pain and nausea medication although she has not had any vomiting in the ED.    CBC is reassuring without leukocytosis or left shift. CMP demonstrates normal electrolytes, renal, and liver function. Glucose is normal. Lipase and lactate are both well within normal limits. Viral swabs and strep test are negative.    Stool testing for C diff was ordered but patient was unable to provide a sample during the 4 hour stay in the ED.    CT abd/pelvis obtained to evaluate for colitis, obstruction, appendicitis. This did not demonstrate acute abnormality and there was no evidence for bowel  "obstruction or focal inflammatory change.    Without leukocytosis or CT imaging c/w colitis, I have a low suspicion for Clostridium Difficile. Possible viral etiology.    Patient was reevaluated at bedside. She is resting comfortably. She has tolerated PO throughout her ED stay without any vomiting. Vital signs remain normal and stable. We discussed her lab/imaging results. She notes that she has felt \"foggy\" for the past two days. She is alert and oriented, interactive, has no focal neurologic deficits. I have a very low suspicion that her mental \"fogginess\" denotes etiology such as stroke. She has declined a CT head. I feel she is safe for d/c with close outpatient management. Patient concurs. We will trial Flonase for her nasal congestion. I recommended close outpatient follow up and strict return precautions. Discharged in good and stable condition.    HYDRATION: Based on the patient's presentation of Dehydration the patient was given IV fluids. IV Hydration was used because oral hydration was not adequate alone. Upon recheck following hydration, the patient was improved.    ADDITIONAL PROBLEM LIST  None  DISPOSITION AND DISCUSSIONS  I have discussed management of the patient with the following physicians and HOSEA's:  None    Discussion of management with other QHP or appropriate source(s): None     Escalation of care considered, and ultimately not performed:acute inpatient care management, however at this time, the patient is most appropriate for outpatient management    Barriers to care at this time, including but not limited to:  None .     Decision tools and prescription drugs considered including, but not limited to:  N/A .    FINAL DIAGNOSIS  1. Viral syndrome      Electronically signed by: Sanket Brown M.D., 7/25/2023 1:15 PM      "

## 2023-07-26 NOTE — ED NOTES
Pt cleared for d/c  dischg instructions given to pt  verbally understands  d/c'ed to home in NAD  instructed to f/u w/ PCP and needed

## 2023-09-29 NOTE — CARE PLAN
The patient is Stable - Low risk of patient condition declining or worsening    Shift Goals  Clinical Goals: Pain control, safety, rest  Patient Goals: Rest    Progress made toward(s) clinical / shift goals:  Patient verbalized understanding of plan of care. Patient's pain is managed with current pain medications per MAR. Patient ambulates independently. Skin interventions per flowsheets.    Patient is not progressing towards the following goals:       160

## 2024-03-11 ENCOUNTER — HOSPITAL ENCOUNTER (OUTPATIENT)
Dept: LAB | Facility: MEDICAL CENTER | Age: 69
End: 2024-03-11
Attending: FAMILY MEDICINE
Payer: COMMERCIAL

## 2024-03-11 LAB
ALBUMIN SERPL BCP-MCNC: 4.4 G/DL (ref 3.2–4.9)
ALBUMIN/GLOB SERPL: 1.8 G/DL
ALP SERPL-CCNC: 97 U/L (ref 30–99)
ALT SERPL-CCNC: 16 U/L (ref 2–50)
ANION GAP SERPL CALC-SCNC: 13 MMOL/L (ref 7–16)
AST SERPL-CCNC: 26 U/L (ref 12–45)
BASOPHILS # BLD AUTO: 1.3 % (ref 0–1.8)
BASOPHILS # BLD: 0.07 K/UL (ref 0–0.12)
BILIRUB SERPL-MCNC: 0.5 MG/DL (ref 0.1–1.5)
BUN SERPL-MCNC: 13 MG/DL (ref 8–22)
CALCIUM ALBUM COR SERPL-MCNC: 9.1 MG/DL (ref 8.5–10.5)
CALCIUM SERPL-MCNC: 9.4 MG/DL (ref 8.5–10.5)
CHLORIDE SERPL-SCNC: 107 MMOL/L (ref 96–112)
CHOLEST SERPL-MCNC: 192 MG/DL (ref 100–199)
CO2 SERPL-SCNC: 19 MMOL/L (ref 20–33)
CREAT SERPL-MCNC: 0.57 MG/DL (ref 0.5–1.4)
EOSINOPHIL # BLD AUTO: 0.09 K/UL (ref 0–0.51)
EOSINOPHIL NFR BLD: 1.6 % (ref 0–6.9)
ERYTHROCYTE [DISTWIDTH] IN BLOOD BY AUTOMATED COUNT: 41.6 FL (ref 35.9–50)
GFR SERPLBLD CREATININE-BSD FMLA CKD-EPI: 99 ML/MIN/1.73 M 2
GLOBULIN SER CALC-MCNC: 2.5 G/DL (ref 1.9–3.5)
GLUCOSE SERPL-MCNC: 89 MG/DL (ref 65–99)
HCT VFR BLD AUTO: 40.5 % (ref 37–47)
HDLC SERPL-MCNC: 101 MG/DL
HGB BLD-MCNC: 14 G/DL (ref 12–16)
IMM GRANULOCYTES # BLD AUTO: 0.01 K/UL (ref 0–0.11)
IMM GRANULOCYTES NFR BLD AUTO: 0.2 % (ref 0–0.9)
LDLC SERPL CALC-MCNC: 79 MG/DL
LYMPHOCYTES # BLD AUTO: 2.01 K/UL (ref 1–4.8)
LYMPHOCYTES NFR BLD: 36.4 % (ref 22–41)
MCH RBC QN AUTO: 30.2 PG (ref 27–33)
MCHC RBC AUTO-ENTMCNC: 34.6 G/DL (ref 32.2–35.5)
MCV RBC AUTO: 87.3 FL (ref 81.4–97.8)
MONOCYTES # BLD AUTO: 0.45 K/UL (ref 0–0.85)
MONOCYTES NFR BLD AUTO: 8.2 % (ref 0–13.4)
NEUTROPHILS # BLD AUTO: 2.89 K/UL (ref 1.82–7.42)
NEUTROPHILS NFR BLD: 52.3 % (ref 44–72)
NRBC # BLD AUTO: 0 K/UL
NRBC BLD-RTO: 0 /100 WBC (ref 0–0.2)
PLATELET # BLD AUTO: 319 K/UL (ref 164–446)
PMV BLD AUTO: 9.5 FL (ref 9–12.9)
POTASSIUM SERPL-SCNC: 4.2 MMOL/L (ref 3.6–5.5)
PROT SERPL-MCNC: 6.9 G/DL (ref 6–8.2)
RBC # BLD AUTO: 4.64 M/UL (ref 4.2–5.4)
SODIUM SERPL-SCNC: 139 MMOL/L (ref 135–145)
TRIGL SERPL-MCNC: 59 MG/DL (ref 0–149)
WBC # BLD AUTO: 5.5 K/UL (ref 4.8–10.8)

## 2024-03-11 PROCEDURE — 80053 COMPREHEN METABOLIC PANEL: CPT

## 2024-03-11 PROCEDURE — 80061 LIPID PANEL: CPT

## 2024-03-11 PROCEDURE — 85025 COMPLETE CBC W/AUTO DIFF WBC: CPT

## 2024-03-11 PROCEDURE — 86480 TB TEST CELL IMMUN MEASURE: CPT

## 2024-03-11 PROCEDURE — 36415 COLL VENOUS BLD VENIPUNCTURE: CPT

## 2024-03-13 LAB
GAMMA INTERFERON BACKGROUND BLD IA-ACNC: 0.07 IU/ML
M TB IFN-G BLD-IMP: NEGATIVE
M TB IFN-G CD4+ BCKGRND COR BLD-ACNC: 0.02 IU/ML
MITOGEN IGNF BCKGRD COR BLD-ACNC: >10 IU/ML
QFT TB2 - NIL TBQ2: 0 IU/ML

## 2024-07-20 ENCOUNTER — APPOINTMENT (OUTPATIENT)
Dept: RADIOLOGY | Facility: MEDICAL CENTER | Age: 69
End: 2024-07-20
Attending: EMERGENCY MEDICINE
Payer: COMMERCIAL

## 2024-07-20 ENCOUNTER — HOSPITAL ENCOUNTER (EMERGENCY)
Facility: MEDICAL CENTER | Age: 69
End: 2024-07-20
Attending: EMERGENCY MEDICINE
Payer: COMMERCIAL

## 2024-07-20 VITALS
DIASTOLIC BLOOD PRESSURE: 93 MMHG | BODY MASS INDEX: 23.71 KG/M2 | RESPIRATION RATE: 20 BRPM | OXYGEN SATURATION: 96 % | HEART RATE: 63 BPM | HEIGHT: 64 IN | SYSTOLIC BLOOD PRESSURE: 166 MMHG | TEMPERATURE: 98 F | WEIGHT: 138.89 LBS

## 2024-07-20 DIAGNOSIS — M79.671 RIGHT FOOT PAIN: ICD-10-CM

## 2024-07-20 PROCEDURE — 99284 EMERGENCY DEPT VISIT MOD MDM: CPT

## 2024-07-20 PROCEDURE — 73620 X-RAY EXAM OF FOOT: CPT | Mod: RT

## 2024-07-20 PROCEDURE — 700111 HCHG RX REV CODE 636 W/ 250 OVERRIDE (IP): Performed by: EMERGENCY MEDICINE

## 2024-07-20 PROCEDURE — A9270 NON-COVERED ITEM OR SERVICE: HCPCS | Performed by: EMERGENCY MEDICINE

## 2024-07-20 PROCEDURE — 700102 HCHG RX REV CODE 250 W/ 637 OVERRIDE(OP): Performed by: EMERGENCY MEDICINE

## 2024-07-20 RX ORDER — ONDANSETRON 4 MG/1
4 TABLET, ORALLY DISINTEGRATING ORAL ONCE
Status: COMPLETED | OUTPATIENT
Start: 2024-07-20 | End: 2024-07-20

## 2024-07-20 RX ORDER — IBUPROFEN 800 MG/1
800 TABLET ORAL EVERY 8 HOURS PRN
Qty: 30 TABLET | Refills: 0 | Status: SHIPPED | OUTPATIENT
Start: 2024-07-20

## 2024-07-20 RX ORDER — IBUPROFEN 800 MG/1
800 TABLET ORAL EVERY 8 HOURS PRN
COMMUNITY

## 2024-07-20 RX ORDER — HYDROCODONE BITARTRATE AND ACETAMINOPHEN 5; 325 MG/1; MG/1
1 TABLET ORAL EVERY 6 HOURS PRN
Qty: 10 TABLET | Refills: 0 | Status: SHIPPED | OUTPATIENT
Start: 2024-07-20 | End: 2024-07-24

## 2024-07-20 RX ORDER — HYDROCODONE BITARTRATE AND ACETAMINOPHEN 5; 325 MG/1; MG/1
1 TABLET ORAL ONCE
Status: COMPLETED | OUTPATIENT
Start: 2024-07-20 | End: 2024-07-20

## 2024-07-20 RX ORDER — AZITHROMYCIN 250 MG/1
250-500 TABLET, FILM COATED ORAL SEE ADMIN INSTRUCTIONS
COMMUNITY

## 2024-07-20 RX ORDER — AMOXICILLIN 500 MG/1
500 CAPSULE ORAL 3 TIMES DAILY
COMMUNITY

## 2024-07-20 RX ADMIN — ONDANSETRON 4 MG: 4 TABLET, ORALLY DISINTEGRATING ORAL at 13:29

## 2024-07-20 RX ADMIN — HYDROCODONE BITARTRATE AND ACETAMINOPHEN 1 TABLET: 5; 325 TABLET ORAL at 13:29

## 2024-07-20 ASSESSMENT — FIBROSIS 4 INDEX: FIB4 SCORE: 1.39

## (undated) DEVICE — GOWN SURGICAL XX-LARGE - (28EA/CA) SIRUS NON REINFORCED

## (undated) DEVICE — TOWEL STOP TIMEOUT SAFETY FLAG (40EA/CA)

## (undated) DEVICE — NEEDLE NON-SAFETY HYPO 21 GA X 1 1/2 IN HYPO (100/BX)

## (undated) DEVICE — GLOVE BIOGEL PI INDICATOR SZ 7.0 SURGICAL PF LF - (50/BX 4BX/CA)

## (undated) DEVICE — MASK ANESTHESIA ADULT  - (100/CA)

## (undated) DEVICE — PAD LAP STERILE 18 X 18 - (5/PK 40PK/CA)

## (undated) DEVICE — SYRINGE DISP. 50CC LS - (40/BX)

## (undated) DEVICE — NEPTUNE 4 PORT MANIFOLD - (20/PK)

## (undated) DEVICE — GLOVE BIOGEL PI INDICATOR SZ 7.5 SURGICAL PF LF -(50/BX 4BX/CA)

## (undated) DEVICE — SODIUM CHL IRRIGATION 0.9% 1000ML (12EA/CA)

## (undated) DEVICE — HEAD HOLDER JUNIOR/ADULT

## (undated) DEVICE — LACTATED RINGERS INJ 1000 ML - (14EA/CA 60CA/PF)

## (undated) DEVICE — TUBING CLEARLINK DUO-VENT - C-FLO (48EA/CA)

## (undated) DEVICE — SODIUM CHL. IRRIGATION 0.9% 3000ML (4EA/CA 65CA/PF)

## (undated) DEVICE — ELECTRODE DUAL RETURN W/ CORD - (50/PK)

## (undated) DEVICE — MANIFOLD NEPTUNE 1 PORT (20/PK)

## (undated) DEVICE — MASK WITH FACE SHIELD (25/BX 4BX/CA)

## (undated) DEVICE — KIT ANESTHESIA W/CIRCUIT & 3/LT BAG W/FILTER (20EA/CA)

## (undated) DEVICE — GLOVESZ 8.5 BIOGEL PI MICRO - PF LF (50PR/BX)

## (undated) DEVICE — GLOVE BIOGEL PI INDICATOR SZ 8.0 SURGICAL PF LF -(50/BX 4BX/CA)

## (undated) DEVICE — CANISTER SUCTION 3000ML MECHANICAL FILTER AUTO SHUTOFF MEDI-VAC NONSTERILE LF DISP  (40EA/CA)

## (undated) DEVICE — SET EXTENSION WITH 2 PORTS (48EA/CA) ***PART #2C8610 IS A SUBSTITUTE*****

## (undated) DEVICE — SUTURE 2-0 VICRYL PLUS CT-2 - 27 INCH (36/BX)

## (undated) DEVICE — HANDPIECE 10FT INTPLS SCT PLS IRRIGATION HAND CONTROL SET (6/PK)

## (undated) DEVICE — GLOVE SZ 8 BIOGEL PI MICRO - PF LF (50PR/BX)

## (undated) DEVICE — SENSOR OXIMETER ADULT SPO2 RD SET (20EA/BX)

## (undated) DEVICE — LENS/HOOD FOR SPACESUIT - (32/PK) PEEL AWAY FACE

## (undated) DEVICE — SUTURE 1 VICRYL PLUS CTX - 36 INCH (36/BX)

## (undated) DEVICE — TUBE E-T HI-LO CUFF 7.5MM (10EA/PK)

## (undated) DEVICE — CANISTER SUCTION RIGID RED 1500CC (40EA/CA)

## (undated) DEVICE — SUTURE GENERAL

## (undated) DEVICE — CANNULA O2 COMFORT SOFT EAR ADULT 7 FT TUBING (50/CA)

## (undated) DEVICE — GOWN WARMING STANDARD FLEX - (30/CA)

## (undated) DEVICE — PINNING SYSTEM ATTUNE

## (undated) DEVICE — ELECTRODE 850 FOAM ADHESIVE - HYDROGEL RADIOTRNSPRNT (50/PK)

## (undated) DEVICE — GLOVE SZ 7 BIOGEL PI MICRO - PF LF (50PR/BX 4BX/CA)

## (undated) DEVICE — BLADE 90X18X1.27MM SAW SAGITTAL

## (undated) DEVICE — CLOSURE PRINEO SKIN - (2EA/BX)

## (undated) DEVICE — KIT ROOM DECONTAMINATION

## (undated) DEVICE — SUCTION INSTRUMENT YANKAUER BULBOUS TIP W/O VENT (50EA/CA)

## (undated) DEVICE — TUBE CONNECTING SUCTION - CLEAR PLASTIC STERILE 72 IN (50EA/CA)

## (undated) DEVICE — GLOVE SZ 6.5 BIOGEL PI MICRO - PF LF (50PR/BX)

## (undated) DEVICE — SYS BN CMNT HI VAC KT MXR BWL - (MIX-E-VAC II)  (10EA/CA)

## (undated) DEVICE — BLADE RECIPROCATING 12.7 X 78.7 X 1.0MM (1/EA)

## (undated) DEVICE — WATER IRRIGATION STERILE 1000ML (12EA/CA)

## (undated) DEVICE — PROTECTOR ULNA NERVE - (36PR/CA)

## (undated) DEVICE — SENSOR SPO2 NEO LNCS ADHESIVE (20/BX) SEE USER NOTES

## (undated) DEVICE — CHLORAPREP 26 ML APPLICATOR - ORANGE TINT(25/CA)

## (undated) DEVICE — SYRINGE 30 ML LL (56/BX)

## (undated) DEVICE — MASK PANORAMIC OXYGEN PRO2 (30EA/CA)

## (undated) DEVICE — CONTAINER, SPECIMEN, STERILE

## (undated) DEVICE — STOCKINET TUBULAR 6IN STERILE - 6 X 48YDS (25/CA)

## (undated) DEVICE — KIT CUSTOM PROCEDURE SINGLE FOR ENDO  (15/CA)

## (undated) DEVICE — PACK TOTAL KNEE  (1/CA)

## (undated) DEVICE — BLOCK

## (undated) DEVICE — SET LEADWIRE 5 LEAD BEDSIDE DISPOSABLE ECG (1SET OF 5/EA)

## (undated) DEVICE — GLOVE BIOGEL PI INDICATOR SZ 8.5 SURGICAL PF LF - (50PR/BX 4BX/CA)